# Patient Record
Sex: FEMALE | Race: WHITE | NOT HISPANIC OR LATINO | Employment: OTHER | URBAN - NONMETROPOLITAN AREA
[De-identification: names, ages, dates, MRNs, and addresses within clinical notes are randomized per-mention and may not be internally consistent; named-entity substitution may affect disease eponyms.]

---

## 2018-03-03 ENCOUNTER — APPOINTMENT (EMERGENCY)
Dept: RADIOLOGY | Facility: HOSPITAL | Age: 83
End: 2018-03-03
Payer: COMMERCIAL

## 2018-03-03 ENCOUNTER — HOSPITAL ENCOUNTER (EMERGENCY)
Facility: HOSPITAL | Age: 83
Discharge: NURSING, REHAB OR LONG-TERM CARE | End: 2018-03-03
Admitting: EMERGENCY MEDICINE
Payer: COMMERCIAL

## 2018-03-03 VITALS
RESPIRATION RATE: 18 BRPM | TEMPERATURE: 98 F | HEART RATE: 72 BPM | DIASTOLIC BLOOD PRESSURE: 60 MMHG | HEIGHT: 63 IN | SYSTOLIC BLOOD PRESSURE: 112 MMHG | BODY MASS INDEX: 28.39 KG/M2 | OXYGEN SATURATION: 100 % | WEIGHT: 160.25 LBS

## 2018-03-03 DIAGNOSIS — J40 BRONCHITIS: Primary | ICD-10-CM

## 2018-03-03 LAB
ALBUMIN SERPL BCP-MCNC: 2.6 G/DL (ref 3.5–5)
ALP SERPL-CCNC: 70 U/L (ref 46–116)
ALT SERPL W P-5'-P-CCNC: 16 U/L (ref 12–78)
ANION GAP SERPL CALCULATED.3IONS-SCNC: 7 MMOL/L (ref 4–13)
AST SERPL W P-5'-P-CCNC: 12 U/L (ref 5–45)
BASOPHILS # BLD AUTO: 0.04 THOUSANDS/ΜL (ref 0–0.1)
BASOPHILS NFR BLD AUTO: 0 % (ref 0–1)
BILIRUB SERPL-MCNC: 0.3 MG/DL (ref 0.2–1)
BUN SERPL-MCNC: 26 MG/DL (ref 5–25)
CALCIUM SERPL-MCNC: 8.2 MG/DL (ref 8.3–10.1)
CHLORIDE SERPL-SCNC: 110 MMOL/L (ref 100–108)
CO2 SERPL-SCNC: 25 MMOL/L (ref 21–32)
CREAT SERPL-MCNC: 1.13 MG/DL (ref 0.6–1.3)
EOSINOPHIL # BLD AUTO: 0.63 THOUSAND/ΜL (ref 0–0.61)
EOSINOPHIL NFR BLD AUTO: 5 % (ref 0–6)
ERYTHROCYTE [DISTWIDTH] IN BLOOD BY AUTOMATED COUNT: 15.7 % (ref 11.6–15.1)
FLUAV AG SPEC QL: NORMAL
FLUBV AG SPEC QL: NORMAL
GFR SERPL CREATININE-BSD FRML MDRD: 42 ML/MIN/1.73SQ M
GLUCOSE SERPL-MCNC: 86 MG/DL (ref 65–140)
HCT VFR BLD AUTO: 35.6 % (ref 34.8–46.1)
HGB BLD-MCNC: 11.4 G/DL (ref 11.5–15.4)
LACTATE SERPL-SCNC: 0.8 MMOL/L (ref 0.5–2)
LYMPHOCYTES # BLD AUTO: 1.83 THOUSANDS/ΜL (ref 0.6–4.47)
LYMPHOCYTES NFR BLD AUTO: 14 % (ref 14–44)
MCH RBC QN AUTO: 31.6 PG (ref 26.8–34.3)
MCHC RBC AUTO-ENTMCNC: 32 G/DL (ref 31.4–37.4)
MCV RBC AUTO: 99 FL (ref 82–98)
MONOCYTES # BLD AUTO: 0.85 THOUSAND/ΜL (ref 0.17–1.22)
MONOCYTES NFR BLD AUTO: 7 % (ref 4–12)
NEUTROPHILS # BLD AUTO: 9.4 THOUSANDS/ΜL (ref 1.85–7.62)
NEUTS SEG NFR BLD AUTO: 74 % (ref 43–75)
NT-PROBNP SERPL-MCNC: 429 PG/ML
PLATELET # BLD AUTO: 404 THOUSANDS/UL (ref 149–390)
PMV BLD AUTO: 8.7 FL (ref 8.9–12.7)
POTASSIUM SERPL-SCNC: 5.1 MMOL/L (ref 3.5–5.3)
PROT SERPL-MCNC: 6.1 G/DL (ref 6.4–8.2)
RBC # BLD AUTO: 3.61 MILLION/UL (ref 3.81–5.12)
RSV B RNA SPEC QL NAA+PROBE: NORMAL
SODIUM SERPL-SCNC: 142 MMOL/L (ref 136–145)
WBC # BLD AUTO: 12.75 THOUSAND/UL (ref 4.31–10.16)

## 2018-03-03 PROCEDURE — 83880 ASSAY OF NATRIURETIC PEPTIDE: CPT | Performed by: PHYSICIAN ASSISTANT

## 2018-03-03 PROCEDURE — 87798 DETECT AGENT NOS DNA AMP: CPT | Performed by: PHYSICIAN ASSISTANT

## 2018-03-03 PROCEDURE — 99285 EMERGENCY DEPT VISIT HI MDM: CPT

## 2018-03-03 PROCEDURE — 80053 COMPREHEN METABOLIC PANEL: CPT | Performed by: PHYSICIAN ASSISTANT

## 2018-03-03 PROCEDURE — 85025 COMPLETE CBC W/AUTO DIFF WBC: CPT | Performed by: PHYSICIAN ASSISTANT

## 2018-03-03 PROCEDURE — 93005 ELECTROCARDIOGRAM TRACING: CPT | Performed by: PHYSICIAN ASSISTANT

## 2018-03-03 PROCEDURE — 71046 X-RAY EXAM CHEST 2 VIEWS: CPT

## 2018-03-03 PROCEDURE — 83605 ASSAY OF LACTIC ACID: CPT | Performed by: PHYSICIAN ASSISTANT

## 2018-03-03 PROCEDURE — 36415 COLL VENOUS BLD VENIPUNCTURE: CPT | Performed by: PHYSICIAN ASSISTANT

## 2018-03-03 RX ORDER — FERROUS SULFATE 325(65) MG
325 TABLET ORAL 2 TIMES DAILY WITH MEALS
COMMUNITY
End: 2019-03-20 | Stop reason: HOSPADM

## 2018-03-03 RX ORDER — ECHINACEA PURPUREA EXTRACT 125 MG
1 TABLET ORAL 4 TIMES DAILY PRN
COMMUNITY

## 2018-03-03 RX ORDER — ACETAMINOPHEN 325 MG/1
650 TABLET ORAL EVERY 6 HOURS PRN
COMMUNITY
End: 2019-03-20 | Stop reason: HOSPADM

## 2018-03-03 RX ORDER — DOXYCYCLINE 100 MG/1
100 TABLET ORAL 2 TIMES DAILY
Qty: 14 TABLET | Refills: 0 | Status: SHIPPED | OUTPATIENT
Start: 2018-03-03 | End: 2018-03-10

## 2018-03-03 RX ORDER — DOXYCYCLINE HYCLATE 100 MG/1
100 CAPSULE ORAL ONCE
Status: COMPLETED | OUTPATIENT
Start: 2018-03-03 | End: 2018-03-03

## 2018-03-03 RX ORDER — MULTIVITAMIN
1 TABLET ORAL DAILY
COMMUNITY
End: 2019-03-20 | Stop reason: HOSPADM

## 2018-03-03 RX ORDER — LEVOTHYROXINE SODIUM 0.07 MG/1
88 TABLET ORAL DAILY
COMMUNITY
End: 2018-12-11 | Stop reason: HOSPADM

## 2018-03-03 RX ORDER — BISACODYL 10 MG
10 SUPPOSITORY, RECTAL RECTAL DAILY PRN
COMMUNITY

## 2018-03-03 RX ORDER — TOLTERODINE 4 MG/1
4 CAPSULE, EXTENDED RELEASE ORAL DAILY
COMMUNITY
End: 2019-03-20 | Stop reason: HOSPADM

## 2018-03-03 RX ADMIN — DOXYCYCLINE HYCLATE 100 MG: 100 CAPSULE ORAL at 14:57

## 2018-03-03 NOTE — DISCHARGE INSTRUCTIONS
Acute Bronchitis   WHAT YOU NEED TO KNOW:   Acute bronchitis is swelling and irritation in the air passages of your lungs  This irritation may cause you to cough or have other breathing problems  Acute bronchitis often starts because of another illness, such as a cold or the flu  The illness spreads from your nose and throat to your windpipe and airways  Bronchitis is often called a chest cold  Acute bronchitis lasts about 3 to 6 weeks and is usually not a serious illness  Your cough can last for several weeks  DISCHARGE INSTRUCTIONS:   Return to the emergency department if:   · You cough up blood  · Your lips or fingernails turn blue  · You feel like you are not getting enough air when you breathe  Contact your healthcare provider if:   · You have a fever  · Your breathing problems do not go away or get worse  · Your cough does not get better within 4 weeks  · You have questions or concerns about your condition or care  Self-care:   · Get more rest   Rest helps your body to heal  Slowly start to do more each day  Rest when you feel it is needed  · Avoid irritants in the air  Avoid chemicals, fumes, and dust  Wear a face mask if you must work around dust or fumes  Stay inside on days when air pollution levels are high  If you have allergies, stay inside when pollen counts are high  Do not use aerosol products, such as spray-on deodorant, bug spray, and hair spray  · Do not smoke or be around others who smoke  Nicotine and other chemicals in cigarettes and cigars damages the cilia that move mucus out of your lungs  Ask your healthcare provider for information if you currently smoke and need help to quit  E-cigarettes or smokeless tobacco still contain nicotine  Talk to your healthcare provider before you use these products  · Drink liquids as directed  Liquids help keep your air passages moist and help you cough up mucus   You may need to drink more liquids when you have acute bronchitis  Ask how much liquid to drink each day and which liquids are best for you  · Use a humidifier or vaporizer  Use a cool mist humidifier or a vaporizer to increase air moisture in your home  This may make it easier for you to breathe and help decrease your cough  Decrease risk for acute bronchitis:   · Get the vaccinations you need  Ask your healthcare provider if you should get vaccinated against the flu or pneumonia  · Prevent the spread of germs  You can decrease your risk of acute bronchitis and other illnesses by doing the following:     [facility] your hands often with soap and water  Carry germ-killing hand lotion or gel with you  You can use the lotion or gel to clean your hands when soap and water are not available  ¨ Do not touch your eyes, nose, or mouth unless you have washed your hands first     ¨ Always cover your mouth when you cough to prevent the spread of germs  It is best to cough into a tissue or your shirt sleeve instead of into your hand  Ask those around you cover their mouths when they cough  ¨ Try to avoid people who have a cold or the flu  If you are sick, stay away from others as much as possible  Medicines: Your healthcare provider may  give you any of the following:  · Ibuprofen or acetaminophen  are medicines that help lower your fever  They are available without a doctor's order  Ask your healthcare provider which medicine is right for you  Ask how much to take and how often to take it  Follow directions  These medicines can cause stomach bleeding if not taken correctly  Ibuprofen can cause kidney damage  Do not take ibuprofen if you have kidney disease, an ulcer, or allergies to aspirin  Acetaminophen can cause liver damage  Do not take more than 4,000 milligrams in 24 hours  · Decongestants  help loosen mucus in your lungs and make it easier to cough up  This can help you breathe easier  · Cough suppressants  decrease your urge to cough   If your cough produces mucus, do not take a cough suppressant unless your healthcare provider tells you to  Your healthcare provider may suggest that you take a cough suppressant at night so you can rest     · Inhalers  may be given  Your healthcare provider may give you one or more inhalers to help you breathe easier and cough less  An inhaler gives your medicine to open your airways  Ask your healthcare provider to show you how to use your inhaler correctly  · Take your medicine as directed  Contact your healthcare provider if you think your medicine is not helping or if you have side effects  Tell him of her if you are allergic to any medicine  Keep a list of the medicines, vitamins, and herbs you take  Include the amounts, and when and why you take them  Bring the list or the pill bottles to follow-up visits  Carry your medicine list with you in case of an emergency  Follow up with your healthcare provider as directed:  Write down questions you have so you will remember to ask them during your follow-up visits  © 2017 [address] [employer] Information is for End User's use only and may not be sold, redistributed or otherwise used for commercial purposes  All illustrations and images included in CareNotes® are the copyrighted property of [employer]  or Nicolás Reyes  The above information is an  only  It is not intended as medical advice for individual conditions or treatments  Talk to your doctor, nurse or pharmacist before following any medical regimen to see if it is safe and effective for you

## 2018-03-03 NOTE — ED PROVIDER NOTES
History  Chief Complaint   Patient presents with    Shortness of Breath     STATED SHE HAS A COUGH WITH SOB AND NASAL CONGESTION FOR THE PAST MONTH  Patient presents to the facility today via emergency medical services  She resides in a nursing home facility  She offers her own history and states over the last 4-6 weeks she has been experiencing nasal congestion with cough  Patient states she expelled mucus with the cough and also expelled as more mucus when she blows her nose  She denies any current chest pain  She states when she has coughing episodes she does experience shortness of breath  Patient denies any novel abdominal pain or novel back pain  She denies urinary complaints  Prior to Admission Medications   Prescriptions Last Dose Informant Patient Reported? Taking?    Multiple Vitamin (MULTIVITAMIN) tablet   Yes Yes   Sig: Take 1 tablet by mouth daily   acetaminophen (TYLENOL) 325 mg tablet   Yes Yes   Sig: Take 650 mg by mouth every 6 (six) hours as needed for mild pain   bisacodyl (DULCOLAX) 10 mg suppository   Yes Yes   Sig: Insert 10 mg into the rectum daily   collagenase (SANTYL) ointment   Yes Yes   Sig: Apply topically 3 (three) times a day   ferrous sulfate 325 (65 Fe) mg tablet   Yes Yes   Sig: Take 325 mg by mouth 2 (two) times a day with meals   levothyroxine 75 mcg tablet   Yes Yes   Sig: Take 75 mcg by mouth daily   magnesium hydroxide (MILK OF MAGNESIA) 400 mg/5 mL oral suspension   Yes Yes   Sig: Take by mouth daily as needed for constipation   sodium chloride (OCEAN) 0 65 % nasal spray   Yes Yes   Si spray into each nostril 4 (four) times a day as needed for congestion   tolterodine (DETROL LA) 4 mg 24 hr capsule   Yes Yes   Sig: Take 4 mg by mouth daily      Facility-Administered Medications: None       Past Medical History:   Diagnosis Date    Anemia     Disease of thyroid gland     Hyperlipidemia     IBS (irritable bowel syndrome)     Poliomyelitis     Renal disorder     UTI (urinary tract infection)        Past Surgical History:   Procedure Laterality Date    APPENDECTOMY         History reviewed  No pertinent family history  I have reviewed and agree with the history as documented  Social History   Substance Use Topics    Smoking status: Never Smoker    Smokeless tobacco: Never Used    Alcohol use No        Review of Systems   Constitutional: Negative  HENT: Positive for congestion, postnasal drip, rhinorrhea, sinus pain and sinus pressure  Negative for dental problem, drooling, ear discharge, ear pain, facial swelling, hearing loss, mouth sores, nosebleeds, sneezing, sore throat, tinnitus, trouble swallowing and voice change  Eyes: Negative  Respiratory: Positive for cough and shortness of breath  Negative for apnea, choking, chest tightness, wheezing and stridor  Cardiovascular: Negative  Gastrointestinal: Negative  Endocrine: Negative  Musculoskeletal: Negative  Skin: Negative  Allergic/Immunologic: Negative  Neurological: Negative  Hematological: Negative  Psychiatric/Behavioral: Negative  Physical Exam  ED Triage Vitals [03/03/18 1307]   Temperature Pulse Respirations Blood Pressure SpO2   98 °F (36 7 °C) 85 20 122/60 95 %      Temp Source Heart Rate Source Patient Position - Orthostatic VS BP Location FiO2 (%)   Tympanic Monitor Sitting Left arm --      Pain Score       3           Orthostatic Vital Signs  Vitals:    03/03/18 1307 03/03/18 1400 03/03/18 1445   BP: 122/60 112/65 122/69   Pulse: 85 81 76   Patient Position - Orthostatic VS: Sitting Sitting Sitting       Physical Exam   Constitutional: She is oriented to person, place, and time  She appears well-developed and well-nourished  No distress  HENT:   Head: Normocephalic  Right Ear: External ear normal    Left Ear: External ear normal    Copious amounts of yellow nasal discharge      No evidence of posterior pharyngeal erythema   Eyes: Conjunctivae and EOM are normal  Pupils are equal, round, and reactive to light  Neck: Normal range of motion  Cardiovascular: Normal rate  Murmur heard  Systolic murmur noted   Pulmonary/Chest: Effort normal and breath sounds normal  No respiratory distress  She has no wheezes  She has no rales  She exhibits no tenderness  Abdominal: Soft  Patient has large chronic lower abdominal hernia   Musculoskeletal: Normal range of motion  She exhibits edema  She exhibits no tenderness or deformity  +2 edema in lower legs   Neurological: She is alert and oriented to person, place, and time  Skin: Skin is warm  Capillary refill takes less than 2 seconds  She is not diaphoretic  Vitals reviewed  ED Medications  Medications   doxycycline hyclate (VIBRAMYCIN) capsule 100 mg (not administered)       Diagnostic Studies  Results Reviewed     Procedure Component Value Units Date/Time    Influenza A/B and RSV by PCR (indicated for patients >2 mo of age) [[id number]] Collected:  03/03/18 1440    Lab Status: In process Specimen:  Nasopharyngeal from Nasopharyngeal Swab Updated:  03/03/18 1444    Comprehensive metabolic panel [[id number]]  (Abnormal) Collected:  03/03/18 1315    Lab Status:  Final result Specimen:  Blood from Arm, Right Updated:  03/03/18 1353     Sodium 142 mmol/L      Potassium 5 1 mmol/L      Chloride 110 (H) mmol/L      CO2 25 mmol/L      Anion Gap 7 mmol/L      BUN 26 (H) mg/dL      Creatinine 1 13 mg/dL      Glucose 86 mg/dL      Calcium 8 2 (L) mg/dL      AST 12 U/L      ALT 16 U/L      Alkaline Phosphatase 70 U/L      Total Protein 6 1 (L) g/dL      Albumin 2 6 (L) g/dL      Total Bilirubin 0 30 mg/dL      eGFR 42 ml/min/1 73sq m     Narrative:         National Kidney Disease Education Program recommendations are as follows:  GFR calculation is accurate only with a steady state creatinine  Chronic Kidney disease less than 60 ml/min/1 73 sq  meters  Kidney failure less than 15 ml/min/1 73 sq  meters  BNP [[id number]]  (Normal) Collected:  03/03/18 1315    Lab Status:  Final result Specimen:  Blood from Arm, Right Updated:  03/03/18 1353     NT-proBNP 429 pg/mL     Lactic acid, plasma [[id number]]  (Normal) Collected:  03/03/18 1315    Lab Status:  Final result Specimen:  Blood from Arm, Right Updated:  03/03/18 1350     LACTIC ACID 0 8 mmol/L     Narrative:         Result may be elevated if tourniquet was used during collection      CBC and differential [[id number]]  (Abnormal) Collected:  03/03/18 1315    Lab Status:  Final result Specimen:  Blood from Arm, Right Updated:  03/03/18 1333     WBC 12 75 (H) Thousand/uL      RBC 3 61 (L) Million/uL      Hemoglobin 11 4 (L) g/dL      Hematocrit 35 6 %      MCV 99 (H) fL      MCH 31 6 pg      MCHC 32 0 g/dL      RDW 15 7 (H) %      MPV 8 7 (L) fL      Platelets 827 (H) Thousands/uL      Neutrophils Relative 74 %      Lymphocytes Relative 14 %      Monocytes Relative 7 %      Eosinophils Relative 5 %      Basophils Relative 0 %      Neutrophils Absolute 9 40 (H) Thousands/µL      Lymphocytes Absolute 1 83 Thousands/µL      Monocytes Absolute 0 85 Thousand/µL      Eosinophils Absolute 0 63 (H) Thousand/µL      Basophils Absolute 0 04 Thousands/µL                  XR chest 2 views    (Results Pending)              Procedures  Procedures       Phone Contacts  ED Phone Contact    ED Course  ED Course as of Mar 03 1452   Sat Mar 03, 2018   1404 LACTIC ACID: 0 8   1404 NT-proBNP: 429   1404 Sodium: 142   1404 Potassium: 5 1   1404 Chloride: (!) 110   1404 CO2: 25   1404 Anion Gap: 7   1404 BUN: (!) 26   1404 Creatinine: 1 13   1404 Calcium: (!) 8 2   1404 AST: 12   1404 Alkaline Phosphatase: 70   1404 Total Protein: (!) 6 1   1404 Albumin: (!) 2 6   1404 eGFR: 42   1404 Total Bilirubin: 0 30         HEART Risk Score    Flowsheet Row Most Recent Value   History  0 Filed at: 03/03/2018 1329   ECG  0 Filed at: 03/03/2018 1329   Age  2 Filed at: 03/03/2018 1329   Risk Factors  1 Filed at: 03/03/2018 1329   Troponin  0 Filed at: 03/03/2018 1329   Heart Score Risk Calculator   History  0 Filed at: 03/03/2018 1329   ECG  0 Filed at: 03/03/2018 1329   Age  2 Filed at: 03/03/2018 1329   Risk Factors  1 Filed at: 03/03/2018 1329   Troponin  0 Filed at: 03/03/2018 1329   HEART Score  3 Filed at: 03/03/2018 1329   HEART Score  3 Filed at: 03/03/2018 1329                            MDM  CritCare Time    Disposition  Final diagnoses:   Bronchitis     Time reflects when diagnosis was documented in both MDM as applicable and the Disposition within this note     Time User Action Codes Description Comment    3/3/2018  2:51 PM Suraj TINAJERO Add [J40] Bronchitis       ED Disposition     ED Disposition Condition Comment    Discharge  Lawence Montcalm discharge to home/self care  Condition at discharge: Good        Follow-up Information     Follow up With Specialties Details Why Contact Info    Irene Bunch MD Family Medicine Schedule an appointment as soon as possible for a visit  [address] [place] [address]  [phone]          Patient's Medications   Discharge Prescriptions    DOXYCYCLINE (ADOXA) 100 MG TABLET    Take 1 tablet (100 mg total) by mouth 2 (two) times a day for 7 days       Start Date: 3/3/2018  End Date: 3/10/2018       Order Dose: 100 mg       Quantity: 14 tablet    Refills: 0     No discharge procedures on file      ED Provider  Electronically Signed by           Fay Coker PA-C  03/03/18 9743

## 2018-03-03 NOTE — ED NOTES
apts to transport pt back to [facility]   [address]  [place]  03/03/18 2186

## 2018-03-03 NOTE — ED PROCEDURE NOTE
Procedure  ECG 12 Lead Documentation  Date/Time: 3/3/2018 1:28 PM  Performed by: Guero Florez  Authorized by: Juice TINAJERO     Indications / Diagnosis:  Shortness of breath  ECG reviewed by me, the ED Provider: yes    Interpretation:     Interpretation: normal    Rate:     ECG rate:  82    ECG rate assessment: normal    Rhythm:     Rhythm: sinus rhythm    Ectopy:     Ectopy: none    QRS:     QRS axis:  Normal    QRS intervals:  Normal  Conduction:     Conduction: normal    ST segments:     ST segments:  Normal  T waves:     T waves: normal                       Felicia Hoang PA-C  03/03/18 1329

## 2018-03-03 NOTE — ED NOTES
Report called to Wilton Bolaños at [facility]  continues to have a small amount of stridor with respirations but respirations easy at rest  Pulse ox 95%       Glory Larry RN  03/03/18 6504

## 2018-03-05 LAB
ATRIAL RATE: 82 BPM
P AXIS: 51 DEGREES
PR INTERVAL: 164 MS
QRS AXIS: 9 DEGREES
QRSD INTERVAL: 140 MS
QT INTERVAL: 414 MS
QTC INTERVAL: 483 MS
T WAVE AXIS: 31 DEGREES
VENTRICULAR RATE: 82 BPM

## 2018-03-05 PROCEDURE — 93010 ELECTROCARDIOGRAM REPORT: CPT | Performed by: INTERNAL MEDICINE

## 2018-06-28 ENCOUNTER — APPOINTMENT (EMERGENCY)
Dept: RADIOLOGY | Facility: HOSPITAL | Age: 83
End: 2018-06-28
Payer: COMMERCIAL

## 2018-06-28 ENCOUNTER — HOSPITAL ENCOUNTER (EMERGENCY)
Facility: HOSPITAL | Age: 83
Discharge: NURSING, REHAB OR LONG-TERM CARE | End: 2018-06-28
Admitting: EMERGENCY MEDICINE
Payer: COMMERCIAL

## 2018-06-28 VITALS
RESPIRATION RATE: 20 BRPM | SYSTOLIC BLOOD PRESSURE: 144 MMHG | HEART RATE: 86 BPM | TEMPERATURE: 98 F | WEIGHT: 168.87 LBS | OXYGEN SATURATION: 92 % | DIASTOLIC BLOOD PRESSURE: 64 MMHG | BODY MASS INDEX: 29.91 KG/M2

## 2018-06-28 DIAGNOSIS — N39.0 UTI (URINARY TRACT INFECTION): Primary | ICD-10-CM

## 2018-06-28 DIAGNOSIS — J40 BRONCHITIS: ICD-10-CM

## 2018-06-28 LAB
ALBUMIN SERPL BCP-MCNC: 2.9 G/DL (ref 3.5–5)
ALP SERPL-CCNC: 101 U/L (ref 46–116)
ALT SERPL W P-5'-P-CCNC: 22 U/L (ref 12–78)
ANION GAP SERPL CALCULATED.3IONS-SCNC: 7 MMOL/L (ref 4–13)
APTT PPP: 31 SECONDS (ref 24–36)
AST SERPL W P-5'-P-CCNC: 14 U/L (ref 5–45)
ATRIAL RATE: 86 BPM
BACTERIA UR QL AUTO: ABNORMAL /HPF
BASOPHILS # BLD AUTO: 0.03 THOUSANDS/ΜL (ref 0–0.1)
BASOPHILS NFR BLD AUTO: 0 % (ref 0–1)
BILIRUB SERPL-MCNC: 0.3 MG/DL (ref 0.2–1)
BILIRUB UR QL STRIP: NEGATIVE
BUN SERPL-MCNC: 34 MG/DL (ref 5–25)
CALCIUM SERPL-MCNC: 9.2 MG/DL (ref 8.3–10.1)
CHLORIDE SERPL-SCNC: 109 MMOL/L (ref 100–108)
CLARITY UR: ABNORMAL
CO2 SERPL-SCNC: 28 MMOL/L (ref 21–32)
COLOR UR: YELLOW
CREAT SERPL-MCNC: 1.18 MG/DL (ref 0.6–1.3)
EOSINOPHIL # BLD AUTO: 0.42 THOUSAND/ΜL (ref 0–0.61)
EOSINOPHIL NFR BLD AUTO: 5 % (ref 0–6)
ERYTHROCYTE [DISTWIDTH] IN BLOOD BY AUTOMATED COUNT: 15.2 % (ref 11.6–15.1)
GFR SERPL CREATININE-BSD FRML MDRD: 40 ML/MIN/1.73SQ M
GLUCOSE SERPL-MCNC: 82 MG/DL (ref 65–140)
GLUCOSE UR STRIP-MCNC: NEGATIVE MG/DL
HCT VFR BLD AUTO: 38 % (ref 34.8–46.1)
HGB BLD-MCNC: 12.2 G/DL (ref 11.5–15.4)
HGB UR QL STRIP.AUTO: ABNORMAL
INR PPP: 1.02 (ref 0.86–1.17)
KETONES UR STRIP-MCNC: NEGATIVE MG/DL
LACTATE SERPL-SCNC: 1 MMOL/L (ref 0.5–2)
LEUKOCYTE ESTERASE UR QL STRIP: ABNORMAL
LYMPHOCYTES # BLD AUTO: 1.08 THOUSANDS/ΜL (ref 0.6–4.47)
LYMPHOCYTES NFR BLD AUTO: 12 % (ref 14–44)
MAGNESIUM SERPL-MCNC: 2.1 MG/DL (ref 1.6–2.6)
MCH RBC QN AUTO: 31.8 PG (ref 26.8–34.3)
MCHC RBC AUTO-ENTMCNC: 32.1 G/DL (ref 31.4–37.4)
MCV RBC AUTO: 99 FL (ref 82–98)
MONOCYTES # BLD AUTO: 0.57 THOUSAND/ΜL (ref 0.17–1.22)
MONOCYTES NFR BLD AUTO: 6 % (ref 4–12)
NEUTROPHILS # BLD AUTO: 7.17 THOUSANDS/ΜL (ref 1.85–7.62)
NEUTS SEG NFR BLD AUTO: 77 % (ref 43–75)
NITRITE UR QL STRIP: NEGATIVE
NON-SQ EPI CELLS URNS QL MICRO: ABNORMAL /HPF
NT-PROBNP SERPL-MCNC: 330 PG/ML
P AXIS: 52 DEGREES
PH UR STRIP.AUTO: 6.5 [PH] (ref 4.5–8)
PLATELET # BLD AUTO: 292 THOUSANDS/UL (ref 149–390)
PMV BLD AUTO: 9.5 FL (ref 8.9–12.7)
POTASSIUM SERPL-SCNC: 5 MMOL/L (ref 3.5–5.3)
PR INTERVAL: 170 MS
PROT SERPL-MCNC: 7 G/DL (ref 6.4–8.2)
PROT UR STRIP-MCNC: ABNORMAL MG/DL
PROTHROMBIN TIME: 12.9 SECONDS (ref 11.8–14.2)
QRS AXIS: 5 DEGREES
QRSD INTERVAL: 148 MS
QT INTERVAL: 436 MS
QTC INTERVAL: 521 MS
RBC # BLD AUTO: 3.84 MILLION/UL (ref 3.81–5.12)
RBC #/AREA URNS AUTO: ABNORMAL /HPF
SODIUM SERPL-SCNC: 144 MMOL/L (ref 136–145)
SP GR UR STRIP.AUTO: 1.01 (ref 1–1.03)
T WAVE AXIS: 21 DEGREES
TROPONIN I SERPL-MCNC: <0.02 NG/ML
UROBILINOGEN UR QL STRIP.AUTO: 0.2 E.U./DL
VENTRICULAR RATE: 86 BPM
WBC # BLD AUTO: 9.27 THOUSAND/UL (ref 4.31–10.16)
WBC #/AREA URNS AUTO: ABNORMAL /HPF

## 2018-06-28 PROCEDURE — 81001 URINALYSIS AUTO W/SCOPE: CPT | Performed by: PHYSICIAN ASSISTANT

## 2018-06-28 PROCEDURE — 93005 ELECTROCARDIOGRAM TRACING: CPT

## 2018-06-28 PROCEDURE — 84484 ASSAY OF TROPONIN QUANT: CPT | Performed by: PHYSICIAN ASSISTANT

## 2018-06-28 PROCEDURE — 71046 X-RAY EXAM CHEST 2 VIEWS: CPT

## 2018-06-28 PROCEDURE — 85025 COMPLETE CBC W/AUTO DIFF WBC: CPT | Performed by: PHYSICIAN ASSISTANT

## 2018-06-28 PROCEDURE — 85730 THROMBOPLASTIN TIME PARTIAL: CPT | Performed by: PHYSICIAN ASSISTANT

## 2018-06-28 PROCEDURE — 83735 ASSAY OF MAGNESIUM: CPT | Performed by: PHYSICIAN ASSISTANT

## 2018-06-28 PROCEDURE — 85610 PROTHROMBIN TIME: CPT | Performed by: PHYSICIAN ASSISTANT

## 2018-06-28 PROCEDURE — 99285 EMERGENCY DEPT VISIT HI MDM: CPT

## 2018-06-28 PROCEDURE — 93010 ELECTROCARDIOGRAM REPORT: CPT | Performed by: INTERNAL MEDICINE

## 2018-06-28 PROCEDURE — 83605 ASSAY OF LACTIC ACID: CPT | Performed by: PHYSICIAN ASSISTANT

## 2018-06-28 PROCEDURE — 94640 AIRWAY INHALATION TREATMENT: CPT

## 2018-06-28 PROCEDURE — 80053 COMPREHEN METABOLIC PANEL: CPT | Performed by: PHYSICIAN ASSISTANT

## 2018-06-28 PROCEDURE — 83880 ASSAY OF NATRIURETIC PEPTIDE: CPT | Performed by: PHYSICIAN ASSISTANT

## 2018-06-28 PROCEDURE — 87040 BLOOD CULTURE FOR BACTERIA: CPT | Performed by: PHYSICIAN ASSISTANT

## 2018-06-28 PROCEDURE — 36415 COLL VENOUS BLD VENIPUNCTURE: CPT | Performed by: PHYSICIAN ASSISTANT

## 2018-06-28 RX ORDER — ALBUTEROL SULFATE 2.5 MG/3ML
2.5 SOLUTION RESPIRATORY (INHALATION) ONCE
Status: COMPLETED | OUTPATIENT
Start: 2018-06-28 | End: 2018-06-28

## 2018-06-28 RX ORDER — ACETAMINOPHEN 325 MG/1
650 TABLET ORAL ONCE
Status: COMPLETED | OUTPATIENT
Start: 2018-06-28 | End: 2018-06-28

## 2018-06-28 RX ORDER — CEPHALEXIN 250 MG/1
500 CAPSULE ORAL ONCE
Status: COMPLETED | OUTPATIENT
Start: 2018-06-28 | End: 2018-06-28

## 2018-06-28 RX ORDER — CEPHALEXIN 500 MG/1
500 CAPSULE ORAL EVERY 8 HOURS SCHEDULED
Qty: 15 CAPSULE | Refills: 0 | Status: SHIPPED | OUTPATIENT
Start: 2018-06-28 | End: 2018-07-03

## 2018-06-28 RX ADMIN — ACETAMINOPHEN 650 MG: 325 TABLET, FILM COATED ORAL at 20:29

## 2018-06-28 RX ADMIN — IPRATROPIUM BROMIDE 0.5 MG: 0.5 SOLUTION RESPIRATORY (INHALATION) at 12:49

## 2018-06-28 RX ADMIN — ALBUTEROL SULFATE 2.5 MG: 2.5 SOLUTION RESPIRATORY (INHALATION) at 12:49

## 2018-06-28 RX ADMIN — CEPHALEXIN 500 MG: 250 CAPSULE ORAL at 15:50

## 2018-06-28 NOTE — ED PROCEDURE NOTE
Procedure  ECG 12 Lead Documentation  Date/Time: 6/28/2018 12:37 PM  Performed by: David Beltre by: Eduin TINAJERO     Indications / Diagnosis:  Shortness of breath  ECG reviewed by me, the ED Provider: yes    Interpretation:     Interpretation: non-specific    Rate:     ECG rate:  85    ECG rate assessment: normal    Rhythm:     Rhythm: sinus rhythm    Ectopy:     Ectopy: none    QRS:     QRS axis:  Normal    QRS intervals:  Normal  Conduction:     Conduction: abnormal      Abnormal conduction: bifascicular block    ST segments:     ST segments:  Normal                     Vera Sapp PA-C  06/28/18 1238

## 2018-06-28 NOTE — ED PROCEDURE NOTE
Procedure  ECG 12 Lead Documentation  Date/Time: 6/28/2018 3:02 PM  Performed by: Loren Dempsey  Authorized by: Fiona TINAJERO     Indications / Diagnosis:  Shortness of breath  Patient location:  ED  Previous ECG:     Previous ECG:  Compared to current    Comparison ECG info:  3/18    Similarity:  No change  Interpretation:     Interpretation: normal    Rate:     ECG rate:  86  Rhythm:     Rhythm: sinus rhythm    Ectopy:     Ectopy: none    QRS:     QRS axis:  Normal    QRS intervals:  Normal  Conduction:     Conduction: normal    ST segments:     ST segments:  Normal  T waves:     T waves: normal                       Almaz Ness PA-C  06/28/18 150

## 2018-06-28 NOTE — ED PROVIDER NOTES
History  Chief Complaint   Patient presents with    Shortness of Breath     Patient states she has been short of breath for the past month but today it got worse  Patient presents to the emergency department today via [facility] for shortness of breath  We did receive a phone call ahead about an hour and a half prior to this arrival of a 80year-old nursing home patient who according to the nursing home staff has been short of breath since the 11 the 7 shift last night   They stated that they did give her a nebulizer treatment which was a DuoNeb at 0 600 hours this morning and that was ineffective  They noted oxygen saturations of 88% on room air and her lungs are full of everything     At bedside patient is alert and oriented and very polite and is a adequate history care  Patient states she has been coughing for greater than 1 month that has been short of breath over the same time     She denies chest pain or abdominal pain  She denies fevers  She does admit to new leg edema bilaterally however            Prior to Admission Medications   Prescriptions Last Dose Informant Patient Reported? Taking?    Multiple Vitamin (MULTIVITAMIN) tablet   Yes No   Sig: Take 1 tablet by mouth daily   acetaminophen (TYLENOL) 325 mg tablet   Yes No   Sig: Take 650 mg by mouth every 6 (six) hours as needed for mild pain   bisacodyl (DULCOLAX) 10 mg suppository   Yes No   Sig: Insert 10 mg into the rectum daily   collagenase (SANTYL) ointment   Yes No   Sig: Apply topically 3 (three) times a day   ferrous sulfate 325 (65 Fe) mg tablet   Yes No   Sig: Take 325 mg by mouth 2 (two) times a day with meals   levothyroxine 75 mcg tablet   Yes No   Sig: Take 75 mcg by mouth daily   magnesium hydroxide (MILK OF MAGNESIA) 400 mg/5 mL oral suspension   Yes No   Sig: Take by mouth daily as needed for constipation   sodium chloride (OCEAN) 0 65 % nasal spray   Yes No   Si spray into each nostril 4 (four) times a day as needed for congestion   tolterodine (DETROL LA) 4 mg 24 hr capsule   Yes No   Sig: Take 4 mg by mouth daily      Facility-Administered Medications: None       Past Medical History:   Diagnosis Date    Anemia     Disease of thyroid gland     Hyperlipidemia     IBS (irritable bowel syndrome)     Poliomyelitis     Renal disorder     UTI (urinary tract infection)        Past Surgical History:   Procedure Laterality Date    APPENDECTOMY         History reviewed  No pertinent family history  I have reviewed and agree with the history as documented  Social History   Substance Use Topics    Smoking status: Never Smoker    Smokeless tobacco: Never Used    Alcohol use No        Review of Systems   Constitutional: Negative  HENT: Negative  Eyes: Negative  Respiratory: Positive for cough and shortness of breath  Negative for apnea, choking, chest tightness, wheezing and stridor  Cardiovascular: Positive for leg swelling  Negative for chest pain and palpitations  Gastrointestinal: Negative  Endocrine: Negative  Genitourinary: Negative  Musculoskeletal: Negative  Skin: Negative  Allergic/Immunologic: Negative  Neurological: Negative  Hematological: Negative  Psychiatric/Behavioral: Negative  All other systems reviewed and are negative  Physical Exam  Physical Exam   Constitutional: She is oriented to person, place, and time  She appears well-developed and well-nourished  No distress  HENT:   Head: Normocephalic  Eyes: Pupils are equal, round, and reactive to light  Neck: Normal range of motion  Cardiovascular: Normal rate  Pulmonary/Chest: No respiratory distress  She has wheezes  She exhibits no tenderness  Diffuse wheeze and rhonchi   Abdominal: Soft  There is no tenderness  Musculoskeletal: She exhibits edema  +3 pitting edema bilaterally lower extremities   Neurological: She is alert and oriented to person, place, and time  Skin: Skin is warm  Capillary refill takes less than 2 seconds  She is not diaphoretic  Psychiatric: She has a normal mood and affect  Vitals reviewed        Vital Signs  ED Triage Vitals [06/28/18 1216]   Temperature Pulse Respirations Blood Pressure SpO2   98 °F (36 7 °C) 88 22 143/89 96 %      Temp Source Heart Rate Source Patient Position - Orthostatic VS BP Location FiO2 (%)   Temporal Monitor Lying Left arm --      Pain Score       2           Vitals:    06/28/18 1216 06/28/18 1230 06/28/18 1400 06/28/18 1501   BP: 143/89 133/84  130/61   Pulse: 88 91 85 84   Patient Position - Orthostatic VS: Lying   Lying       Visual Acuity      ED Medications  Medications   albuterol inhalation solution 2 5 mg (2 5 mg Nebulization Given 6/28/18 1249)   ipratropium (ATROVENT) 0 02 % inhalation solution 0 5 mg (0 5 mg Nebulization Given 6/28/18 1249)   cephalexin (KEFLEX) capsule 500 mg (500 mg Oral Given 6/28/18 1550)       Diagnostic Studies  Results Reviewed     Procedure Component Value Units Date/Time    Urine Microscopic [[id number]]  (Abnormal) Collected:  06/28/18 1500    Lab Status:  Final result Specimen:  Urine from Urine, Clean Catch Updated:  06/28/18 1522     RBC, UA 2-4 (A) /hpf      WBC, UA 10-20 (A) /hpf      Epithelial Cells None Seen /hpf      Bacteria, UA Occasional /hpf     UA w Reflex to Microscopic [[id number]]  (Abnormal) Collected:  06/28/18 1500    Lab Status:  Final result Specimen:  Urine from Urine, Clean Catch Updated:  06/28/18 1511     Color, UA Yellow     Clarity, UA Slightly Cloudy     Specific Gravity, UA 1 015     pH, UA 6 5     Leukocytes, UA Moderate (A)     Nitrite, UA Negative     Protein, UA Trace (A) mg/dl      Glucose, UA Negative mg/dl      Ketones, UA Negative mg/dl      Urobilinogen, UA 0 2 E U /dl      Bilirubin, UA Negative     Blood, UA Trace-Intact    Comprehensive metabolic panel [[id number]]  (Abnormal) Collected:  06/28/18 1232    Lab Status:  Final result Specimen:  Blood from Arm, Left Updated:  06/28/18 1308     Sodium 144 mmol/L      Potassium 5 0 mmol/L      Chloride 109 (H) mmol/L      CO2 28 mmol/L      Anion Gap 7 mmol/L      BUN 34 (H) mg/dL      Creatinine 1 18 mg/dL      Glucose 82 mg/dL      Calcium 9 2 mg/dL      AST 14 U/L      ALT 22 U/L      Alkaline Phosphatase 101 U/L      Total Protein 7 0 g/dL      Albumin 2 9 (L) g/dL      Total Bilirubin 0 30 mg/dL      eGFR 40 ml/min/1 73sq m     Narrative:         National Kidney Disease Education Program recommendations are as follows:  GFR calculation is accurate only with a steady state creatinine  Chronic Kidney disease less than 60 ml/min/1 73 sq  meters  Kidney failure less than 15 ml/min/1 73 sq  meters  Magnesium [[id number]]  (Normal) Collected:  06/28/18 1232    Lab Status:  Final result Specimen:  Blood from Arm, Left Updated:  06/28/18 1308     Magnesium 2 1 mg/dL     B-type natriuretic peptide [[id number]]  (Normal) Collected:  06/28/18 1232    Lab Status:  Final result Specimen:  Blood from Arm, Left Updated:  06/28/18 1308     NT-proBNP 330 pg/mL     Troponin I [[id number]]  (Normal) Collected:  06/28/18 1232    Lab Status:  Final result Specimen:  Blood from Arm, Left Updated:  06/28/18 1301     Troponin I <0 02 ng/mL     Lactic acid, plasma [[id number]]  (Normal) Collected:  06/28/18 1232    Lab Status:  Final result Specimen:  Blood from Arm, Left Updated:  06/28/18 1301     LACTIC ACID 1 0 mmol/L     Narrative:         Result may be elevated if tourniquet was used during collection      Veramarkos Cardona [[id number]]  (Normal) Collected:  06/28/18 1232    Lab Status:  Final result Specimen:  Blood from Arm, Left Updated:  06/28/18 1252     Protime 12 9 seconds      INR 1 02    APTT [[id number]]  (Normal) Collected:  06/28/18 1232    Lab Status:  Final result Specimen:  Blood from Arm, Left Updated:  06/28/18 1252     PTT 31 seconds     CBC and differential [[id number]]  (Abnormal) Collected:  06/28/18 1232    Lab Status: Final result Specimen:  Blood from Arm, Left Updated:  06/28/18 1243     WBC 9 27 Thousand/uL      RBC 3 84 Million/uL      Hemoglobin 12 2 g/dL      Hematocrit 38 0 %      MCV 99 (H) fL      MCH 31 8 pg      MCHC 32 1 g/dL      RDW 15 2 (H) %      MPV 9 5 fL      Platelets 003 Thousands/uL      Neutrophils Relative 77 (H) %      Lymphocytes Relative 12 (L) %      Monocytes Relative 6 %      Eosinophils Relative 5 %      Basophils Relative 0 %      Neutrophils Absolute 7 17 Thousands/µL      Lymphocytes Absolute 1 08 Thousands/µL      Monocytes Absolute 0 57 Thousand/µL      Eosinophils Absolute 0 42 Thousand/µL      Basophils Absolute 0 03 Thousands/µL     Blood culture #1 [[id number]] Collected:  06/28/18 1232    Lab Status: In process Specimen:  Blood from Hand, Left Updated:  06/28/18 1238    Blood culture #2 [[id number]] Collected:  06/28/18 1232    Lab Status: In process Specimen:  Blood from Arm, Left Updated:  06/28/18 1238                 XR chest 2 views   Final Result by Omayra Lion MD (06/28 1415)      No acute cardiopulmonary disease  Workstation performed: [id number]                    Procedures  Procedures       Phone Contacts  ED Phone Contact    ED Course  ED Course as of Jun 28 1553   Thu Jun 28, 2018   1219 SpO2: 96 %   1219 Respirations: 22   1219 Pulse: 88   1219 Temperature: 98 °F (36 7 °C)   1219 Blood Pressure: 143/89   1302 WBC: 9 27   1302 Hemoglobin: 12 2   1302 Platelets: 337   1405 PTT: 31   1302 Troponin I: <0 02   1302 INR: 1 02   1427 FINDINGS:    Cardiomediastinal silhouette appears unremarkable  The lungs are clear   No pneumothorax or pleural effusion  Old right rib fractures again identified   Right axillary surgical clips again noted  Impression       No acute cardiopulmonary disease          1427 NT-proBNP: 330   1507 Awaiting urine, at bedside pt doing well, O2 at 97% on RA    1513 Leukocytes, UA: (!) Moderate   1533 WBC, UA: (!) 10-20   1534 Bacteria, UA: Occasional   1538 Patient does have between 10 and 20 white cells per high-powered field  I did review a prior urine culture and will utilize cephalosporins to treat as an outpatient  HEART Risk Score      Most Recent Value   History  0 Filed at: 06/28/2018 1238   ECG  1 Filed at: 06/28/2018 1238   Age  2 Filed at: 06/28/2018 1238   Risk Factors  1 Filed at: 06/28/2018 1238   Troponin  0 Filed at: 06/28/2018 1238   Heart Score Risk Calculator   History  0 Filed at: 06/28/2018 1238   ECG  1 Filed at: 06/28/2018 1238   Age  2 Filed at: 06/28/2018 1238   Risk Factors  1 Filed at: 06/28/2018 1238   Troponin  0 Filed at: 06/28/2018 1238   HEART Score  4 Filed at: 06/28/2018 1238   HEART Score  4 Filed at: 06/28/2018 1238                Stroke Assessment     Row Name 06/28/18 1508             NIH Stroke Scale    Interval Baseline      Level of Consciousness (1a ) 0      LOC Questions (1b ) 0      LOC Commands (1c ) 0      Best Gaze (2 ) 0      Visual (3 ) 0      Facial Palsy (4 ) 0      Motor Arm, Left (5a ) 0      Motor Arm, Right (5b ) 0      Motor Leg, Left (6a ) 0      Motor Leg, Right (6b ) 0      Limb Ataxia (7 ) 0      Sensory (8 ) 0      Best Language (9 ) 0      Dysarthria (10 ) 0      Extinction and Inattention (11 ) (Formerly Neglect) 0      Total 0                          MDM  CritCare Time    Disposition  Final diagnoses:   UTI (urinary tract infection)   Bronchitis     Time reflects when diagnosis was documented in both MDM as applicable and the Disposition within this note     Time User Action Codes Description Comment    6/28/2018  3:39 PM Fanny TINAJERO Add [N39 0] UTI (urinary tract infection)     6/28/2018  3:39 PM Fanny TINAJERO Add [J40] Bronchitis       ED Disposition     ED Disposition Condition Comment    Discharge  Rachael Socks discharge to home/self care      Condition at discharge: Good        Follow-up Information     Follow up With Specialties Details Why Contact Info    Reford Marco Monica Barillas,  Family Medicine Schedule an appointment as soon as possible for a visit  [facility] [id number]  [phone]            Patient's Medications   Discharge Prescriptions    CEPHALEXIN (KEFLEX) 500 MG CAPSULE    Take 1 capsule (500 mg total) by mouth every 8 (eight) hours for 5 days       Start Date: 6/28/2018 End Date: 7/3/2018       Order Dose: 500 mg       Quantity: 15 capsule    Refills: 0     No discharge procedures on file      ED Provider  Electronically Signed by           Oswald Mcdowell PA-C  06/28/18 8401 [facility] Claudy Mei PA-C  06/28/18 [id number]

## 2018-07-03 LAB
BACTERIA BLD CULT: NORMAL
BACTERIA BLD CULT: NORMAL

## 2018-08-16 ENCOUNTER — APPOINTMENT (EMERGENCY)
Dept: ULTRASOUND IMAGING | Facility: HOSPITAL | Age: 83
End: 2018-08-16
Payer: COMMERCIAL

## 2018-08-16 ENCOUNTER — APPOINTMENT (EMERGENCY)
Dept: RADIOLOGY | Facility: HOSPITAL | Age: 83
End: 2018-08-16
Payer: COMMERCIAL

## 2018-08-16 ENCOUNTER — HOSPITAL ENCOUNTER (EMERGENCY)
Facility: HOSPITAL | Age: 83
Discharge: HOME | End: 2018-08-16
Attending: EMERGENCY MEDICINE
Payer: COMMERCIAL

## 2018-08-16 VITALS
HEART RATE: 73 BPM | BODY MASS INDEX: 27.99 KG/M2 | HEIGHT: 65 IN | TEMPERATURE: 96.9 F | SYSTOLIC BLOOD PRESSURE: 150 MMHG | RESPIRATION RATE: 17 BRPM | OXYGEN SATURATION: 97 % | WEIGHT: 167.99 LBS | DIASTOLIC BLOOD PRESSURE: 67 MMHG

## 2018-08-16 DIAGNOSIS — M79.89 SWELLING OF RIGHT LOWER EXTREMITY: ICD-10-CM

## 2018-08-16 DIAGNOSIS — M79.89 SWELLING OF LEFT LOWER EXTREMITY: Primary | ICD-10-CM

## 2018-08-16 LAB
ALBUMIN SERPL BCP-MCNC: 2.9 G/DL (ref 3.5–5)
ALP SERPL-CCNC: 86 U/L (ref 46–116)
ALT SERPL W P-5'-P-CCNC: 12 U/L (ref 12–78)
ANION GAP SERPL CALCULATED.3IONS-SCNC: 6 MMOL/L (ref 4–13)
AST SERPL W P-5'-P-CCNC: 16 U/L (ref 5–45)
BASOPHILS # BLD AUTO: 0.03 THOUSANDS/ΜL (ref 0–0.1)
BASOPHILS NFR BLD AUTO: 0 % (ref 0–1)
BILIRUB SERPL-MCNC: 0.2 MG/DL (ref 0.2–1)
BUN SERPL-MCNC: 37 MG/DL (ref 5–25)
CALCIUM SERPL-MCNC: 8.5 MG/DL (ref 8.3–10.1)
CHLORIDE SERPL-SCNC: 108 MMOL/L (ref 100–108)
CO2 SERPL-SCNC: 28 MMOL/L (ref 21–32)
CREAT SERPL-MCNC: 1.12 MG/DL (ref 0.6–1.3)
EOSINOPHIL # BLD AUTO: 0.72 THOUSAND/ΜL (ref 0–0.61)
EOSINOPHIL NFR BLD AUTO: 10 % (ref 0–6)
ERYTHROCYTE [DISTWIDTH] IN BLOOD BY AUTOMATED COUNT: 14.5 % (ref 11.6–15.1)
GFR SERPL CREATININE-BSD FRML MDRD: 42 ML/MIN/1.73SQ M
GLUCOSE SERPL-MCNC: 82 MG/DL (ref 65–140)
HCT VFR BLD AUTO: 38.6 % (ref 34.8–46.1)
HGB BLD-MCNC: 12.2 G/DL (ref 11.5–15.4)
IMM GRANULOCYTES # BLD AUTO: 0.02 THOUSAND/UL (ref 0–0.2)
IMM GRANULOCYTES NFR BLD AUTO: 0 % (ref 0–2)
LYMPHOCYTES # BLD AUTO: 1.47 THOUSANDS/ΜL (ref 0.6–4.47)
LYMPHOCYTES NFR BLD AUTO: 20 % (ref 14–44)
MCH RBC QN AUTO: 31.4 PG (ref 26.8–34.3)
MCHC RBC AUTO-ENTMCNC: 31.6 G/DL (ref 31.4–37.4)
MCV RBC AUTO: 100 FL (ref 82–98)
MONOCYTES # BLD AUTO: 0.56 THOUSAND/ΜL (ref 0.17–1.22)
MONOCYTES NFR BLD AUTO: 8 % (ref 4–12)
NEUTROPHILS # BLD AUTO: 4.47 THOUSANDS/ΜL (ref 1.85–7.62)
NEUTS SEG NFR BLD AUTO: 62 % (ref 43–75)
NRBC BLD AUTO-RTO: 0 /100 WBCS
NT-PROBNP SERPL-MCNC: 206 PG/ML
PLATELET # BLD AUTO: 223 THOUSANDS/UL (ref 149–390)
PMV BLD AUTO: 9.3 FL (ref 8.9–12.7)
POTASSIUM SERPL-SCNC: 5.1 MMOL/L (ref 3.5–5.3)
PROT SERPL-MCNC: 6.6 G/DL (ref 6.4–8.2)
RBC # BLD AUTO: 3.88 MILLION/UL (ref 3.81–5.12)
SODIUM SERPL-SCNC: 142 MMOL/L (ref 136–145)
TROPONIN I SERPL-MCNC: <0.02 NG/ML
WBC # BLD AUTO: 7.27 THOUSAND/UL (ref 4.31–10.16)

## 2018-08-16 PROCEDURE — 93005 ELECTROCARDIOGRAM TRACING: CPT

## 2018-08-16 PROCEDURE — 93970 EXTREMITY STUDY: CPT

## 2018-08-16 PROCEDURE — 80053 COMPREHEN METABOLIC PANEL: CPT | Performed by: EMERGENCY MEDICINE

## 2018-08-16 PROCEDURE — 85025 COMPLETE CBC W/AUTO DIFF WBC: CPT | Performed by: EMERGENCY MEDICINE

## 2018-08-16 PROCEDURE — 99284 EMERGENCY DEPT VISIT MOD MDM: CPT

## 2018-08-16 PROCEDURE — 84484 ASSAY OF TROPONIN QUANT: CPT | Performed by: EMERGENCY MEDICINE

## 2018-08-16 PROCEDURE — 71046 X-RAY EXAM CHEST 2 VIEWS: CPT

## 2018-08-16 PROCEDURE — 83880 ASSAY OF NATRIURETIC PEPTIDE: CPT | Performed by: EMERGENCY MEDICINE

## 2018-08-16 PROCEDURE — 36415 COLL VENOUS BLD VENIPUNCTURE: CPT | Performed by: EMERGENCY MEDICINE

## 2018-08-16 PROCEDURE — 93970 EXTREMITY STUDY: CPT | Performed by: SURGERY

## 2018-08-16 RX ORDER — CEPHALEXIN 250 MG/1
500 CAPSULE ORAL ONCE
Status: COMPLETED | OUTPATIENT
Start: 2018-08-16 | End: 2018-08-16

## 2018-08-16 RX ORDER — CEPHALEXIN 250 MG/1
500 CAPSULE ORAL 4 TIMES DAILY
Qty: 56 CAPSULE | Refills: 0 | Status: SHIPPED | OUTPATIENT
Start: 2018-08-16 | End: 2018-08-23

## 2018-08-16 RX ADMIN — CEPHALEXIN 500 MG: 250 CAPSULE ORAL at 17:46

## 2018-08-16 NOTE — DISCHARGE INSTRUCTIONS
Edema   WHAT YOU NEED TO KNOW:   What is edema? Edema is swelling throughout your body  Edema is usually a sign that you are retaining fluid  The swelling may be caused by heart failure or kidney, thyroid, or liver disease  It may also be caused by medicines such as antidepressants, blood pressure medicines, or hormones  Sudden swelling around the lips or face may be a sign of a severe allergic reaction  Swelling of an arm or leg may be caused by blockage of your veins  What other signs and symptoms may occur with edema? · Discomfort or tenderness in the swollen areas    · Tight and shiny skin over the swollen areas    · Weight gain  How is edema diagnosed? Your healthcare provider will ask about your symptoms and any other symptoms you have  He may also ask about any medical conditions you have  Your healthcare provider will examine your skin over the swollen areas  He may gently push on the swollen area for a short time to see if this leaves a dimple  He may also order tests to find the cause of your edema  How is edema treated and managed? Treatment for edema depends on the cause  Depending on your medical condition, you may be given medicine to help get rid of extra body fluid  Your healthcare provider may suggest that you do any of the following to help manage edema:  · Elevate  your arms or legs as directed  Raise them above the level of your heart as often as you can  This will help decrease swelling and pain  Prop them on pillows or blankets to keep them elevated comfortably  · Wear pressure stockings as directed  The stockings are tight and put pressure on your legs  This helps to keep fluid from collecting in your legs or ankles  · Limit your salt intake  Salt causes your body to hold water  Ask about any other changes to your diet  · Stay active  Do not stand or sit for long periods of time  Ask your healthcare provider about the best exercise plan for you      · Keep your skin moist  using lotion, cream, or ointment  Ask your healthcare provider what to use and how often to use it  When should I contact my healthcare provider? · The swollen area feels cold and is pale or blue in color  · The swollen area feels warm, painful, and is red in color  · You have increased swelling or swelling in other parts of your body  · You have questions or concerns about your condition or care  When should I seek immediate care? · You have shortness of breath at rest, especially when you lie down  · You cough up pink, foamy sputum  · You have chest pain  · Your heartbeat is fast or uneven  CARE AGREEMENT:   You have the right to help plan your care  Learn about your health condition and how it may be treated  Discuss treatment options with your caregivers to decide what care you want to receive  You always have the right to refuse treatment  The above information is an  only  It is not intended as medical advice for individual conditions or treatments  Talk to your doctor, nurse or pharmacist before following any medical regimen to see if it is safe and effective for you  © 2017 [employer]  Information is for End User's use only and may not be sold, redistributed or otherwise used for commercial purposes  All illustrations and images included in CareNotes® are the copyrighted property of [employer]  or Nicol[employer] Reyes

## 2018-08-16 NOTE — ED NOTES
Called [facility] ambulance for return to [facility]   Awaiting callback with Dariana Montez RN  08/16/18 9204

## 2018-08-16 NOTE — ED NOTES
Call placed to [facility]  Spoke with jorge  Made her aware of difficulty obtaining transport for patient   To call back with answers from their call to med [facility] and [facility]     Estefany Hernandez RN  08/16/18 1950

## 2018-08-16 NOTE — ED PROVIDER NOTES
Pt Name: Bladimir Ryan  MRN: [id number]  [place] 1/3/1924  Age/Sex: 80 y o  female  Date of evaluation: 8/16/2018  PCP: Yenny Mcnair, [address] [place] [address]    Chief Complaint   Patient presents with    Leg Swelling     bilateral leg swelling  Right worse than left  Redness left ankle  HPI    Gabo Schmitz presents to the Emergency Department complaining of leg swelling  She has asymmetric legs as baseline due to polio as a child but now they are more swollen that usual   She was sent from [place] for evaluation of the increased swelling  HPI      Past Medical and Surgical History    Past Medical History:   Diagnosis Date    Anemia     Arthritis     Cancer ([address] )     breast    Disease of thyroid gland     Hyperlipidemia     IBS (irritable bowel syndrome)     Overactive bladder     Peripheral neuropathy     Polio     Poliomyelitis     Renal disorder     Rhabdomyolysis     UTI (urinary tract infection)     Ventral hernia        Past Surgical History:   Procedure Laterality Date    APPENDECTOMY      BREAST SURGERY      right mastectomy       History reviewed  No pertinent family history  Social History   Substance Use Topics    Smoking status: Never Smoker    Smokeless tobacco: Never Used    Alcohol use No              Allergies    No Known Allergies    Home Medications    Prior to Admission medications    Medication Sig Start Date End Date Taking?  Authorizing Provider   acetaminophen (TYLENOL) 325 mg tablet Take 650 mg by mouth every 6 (six) hours as needed for mild pain    Historical Provider, MD   bisacodyl (DULCOLAX) 10 mg suppository Insert 10 mg into the rectum daily    Historical Provider, MD   collagenase (SANTYL) ointment Apply topically 3 (three) times a day    Historical Provider, MD   ferrous sulfate 325 (65 Fe) mg tablet Take 325 mg by mouth 2 (two) times a day with meals    Historical Provider, MD   levothyroxine 75 mcg tablet Take 75 mcg by mouth daily    Historical Provider, MD   magnesium hydroxide (MILK OF MAGNESIA) 400 mg/5 mL oral suspension Take by mouth daily as needed for constipation    Historical Provider, MD   Multiple Vitamin (MULTIVITAMIN) tablet Take 1 tablet by mouth daily    Historical Provider, MD   sodium chloride (OCEAN) 0 65 % nasal spray 1 spray into each nostril 4 (four) times a day as needed for congestion    Historical Provider, MD   tolterodine (DETROL LA) 4 mg 24 hr capsule Take 4 mg by mouth daily    Historical Provider, MD           Review of Systems    Review of Systems   Constitutional: Negative for activity change, appetite change, chills, diaphoresis, fatigue and fever  HENT: Negative for congestion, postnasal drip, rhinorrhea, sinus pressure, sneezing and sore throat  Eyes: Negative for pain and visual disturbance  Respiratory: Negative for cough, chest tightness and shortness of breath  Cardiovascular: Negative for chest pain, palpitations and leg swelling  Gastrointestinal: Negative for abdominal distention, abdominal pain, constipation, diarrhea, nausea and vomiting  Endocrine: Negative for polydipsia, polyphagia and polyuria  Genitourinary: Negative for decreased urine volume, difficulty urinating, dysuria, flank pain, frequency and hematuria  Musculoskeletal: Negative for arthralgias, gait problem, joint swelling and neck pain  Skin: Negative for pallor and rash  Allergic/Immunologic: Negative for immunocompromised state  Neurological: Negative for syncope, speech difficulty, weakness, light-headedness, numbness and headaches  All other systems reviewed and are negative        Physical Exam      ED Triage Vitals [08/16/18 1445]   Temperature Pulse Respirations Blood Pressure SpO2   (!) 96 9 °F (36 1 °C) 75 18 151/72 97 %      Temp Source Heart Rate Source Patient Position - Orthostatic VS BP Location FiO2 (%)   Temporal Monitor Sitting Left arm --      Pain Score       No Pain               Physical Exam Constitutional: She is oriented to person, place, and time  She appears well-developed and well-nourished  No distress  HENT:   Head: Normocephalic and atraumatic  Nose: Nose normal    Mouth/Throat: Oropharynx is clear and moist    Eyes: Conjunctivae, EOM and lids are normal  Pupils are equal, round, and reactive to light  Neck: Normal range of motion  Neck supple  Cardiovascular: Normal rate, regular rhythm and normal heart sounds  Exam reveals no gallop and no friction rub  No murmur heard  Pulmonary/Chest: Effort normal and breath sounds normal  No accessory muscle usage  No respiratory distress  She has no wheezes  She has no rales  Abdominal: Soft  She exhibits no distension  There is no tenderness  There is no rebound and no guarding  Musculoskeletal: She exhibits edema  Neurological: She is alert and oriented to person, place, and time  No cranial nerve deficit or sensory deficit  Skin: Skin is warm and dry  No rash noted  She is not diaphoretic  No erythema  Psychiatric: She has a normal mood and affect  Her speech is normal and behavior is normal  Judgment and thought content normal    Nursing note and vitals reviewed  Assessment and Plan    Remy Perez is a 80 y o  female who presents with lower extremity swelling BL  Physical examination remarkable for same  Differential diagnosis (not completely inclusive) includes DVTs, CHF etc  Plan will be to perform diagnostic testing and treat symptomatically  MDM    Diagnostic Results    EKG:  normal EKG, normal sinus rhythm, RBBB    EKG INTERPRETATION  EKG Interpretation    Rate: 72 BPM  Rhythm: sinus  Axis: normal  Intervals: Normal, no blocks, QTc 497ms  Q waves:   T waves: non specific  ST segments: non specific    Impression:  EKG  EKG for comparison: not available  EKG interpreted by me  Interpretation by Domi Bonilla DO  EKG reviewed and interpreted independently      Labs:    Results for orders placed or performed during the hospital encounter of 08/16/18   CBC and differential   Result Value Ref Range    WBC 7 27 4 31 - 10 16 Thousand/uL    RBC 3 88 3 81 - 5 12 Million/uL    Hemoglobin 12 2 11 5 - 15 4 g/dL    Hematocrit 38 6 34 8 - 46 1 %     (H) 82 - 98 fL    MCH 31 4 26 8 - 34 3 pg    MCHC 31 6 31 4 - 37 4 g/dL    RDW 14 5 11 6 - 15 1 %    MPV 9 3 8 9 - 12 7 fL    Platelets 547 510 - 193 Thousands/uL    nRBC 0 /100 WBCs    Neutrophils Relative 62 43 - 75 %    Immat GRANS % 0 0 - 2 %    Lymphocytes Relative 20 14 - 44 %    Monocytes Relative 8 4 - 12 %    Eosinophils Relative 10 (H) 0 - 6 %    Basophils Relative 0 0 - 1 %    Neutrophils Absolute 4 47 1 85 - 7 62 Thousands/µL    Immature Grans Absolute 0 02 0 00 - 0 20 Thousand/uL    Lymphocytes Absolute 1 47 0 60 - 4 47 Thousands/µL    Monocytes Absolute 0 56 0 17 - 1 22 Thousand/µL    Eosinophils Absolute 0 72 (H) 0 00 - 0 61 Thousand/µL    Basophils Absolute 0 03 0 00 - 0 10 Thousands/µL   Comprehensive metabolic panel   Result Value Ref Range    Sodium 142 136 - 145 mmol/L    Potassium 5 1 3 5 - 5 3 mmol/L    Chloride 108 100 - 108 mmol/L    CO2 28 21 - 32 mmol/L    Anion Gap 6 4 - 13 mmol/L    BUN 37 (H) 5 - 25 mg/dL    Creatinine 1 12 0 60 - 1 30 mg/dL    Glucose 82 65 - 140 mg/dL    Calcium 8 5 8 3 - 10 1 mg/dL    AST 16 5 - 45 U/L    ALT 12 12 - 78 U/L    Alkaline Phosphatase 86 46 - 116 U/L    Total Protein 6 6 6 4 - 8 2 g/dL    Albumin 2 9 (L) 3 5 - 5 0 g/dL    Total Bilirubin 0 20 0 20 - 1 00 mg/dL    eGFR 42 ml/min/1 73sq m   Troponin I   Result Value Ref Range    Troponin I <0 02 <=0 04 ng/mL   B-type natriuretic peptide   Result Value Ref Range    NT-proBNP 206 <450 pg/mL   ECG 12 lead   Result Value Ref Range    Ventricular Rate 72 BPM    Atrial Rate 72 BPM    NJ Interval 188 ms    QRSD Interval 140 ms    QT Interval 454 ms    QTC Interval 497 ms    P Axis 47 degrees    QRS Axis 7 degrees    T Wave Axis 22 degrees       All labs reviewed and utilized in the medical decision making process    Radiology:    VAS lower limb venous duplex study, complete bilateral   Final Result      XR chest 2 views   Final Result      1  Small left pleural effusion  2   Hiatal hernia  Workstation performed: [id number]             All radiology studies independently viewed by me and interpreted by the radiologist     Procedure    Procedures    CritCare Time      ED Course of Care and Re-Assessments        Medications   cephalexin (KEFLEX) capsule 500 mg (500 mg Oral Given 8/16/18 1746)           FINAL IMPRESSION    Final diagnoses:   Swelling of left lower extremity   Swelling of right lower extremity         DISPOSITION/PLAN    Time reflects when diagnosis was documented in both MDM as applicable and the Disposition within this note     Time User Action Codes Description Comment    8/16/2018  5:11 PM Altamese Bolton Add [M79 89] Swelling of left lower extremity     8/16/2018  5:13 PM Altamese Aayush L Add [M79 89] Swelling of right lower extremity       ED Disposition     ED Disposition Condition Comment    Discharge  Padmini Hylton discharge to home/self care      Condition at discharge: Good        Follow-up Information     Follow up With Specialties Details Why Contact Info Additional Theodore Huertas DO Family Medicine Schedule an appointment as soon as possible for a visit  [address] [place] [address] Emergency Department Emergency Medicine Go to As needed, If symptoms worsen [facility] ED, [facility], [place], [address] [facility], [address] [facility] TO:    Quique Kirkland DO  [facility] [address] [place] [address]  [phone]    Schedule an appointment as soon as possible for a visit      [facility] Emergency Department  [place] [id number]  [phone]  Go to  As needed, If symptoms worsen      DISCHARGE MEDICATIONS:    Discharge Medication List as of 8/16/2018  5:13 PM      START taking these medications    Details   cephalexin (KEFLEX) 250 mg capsule Take 2 capsules (500 mg total) by mouth 4 (four) times a day for 7 days, Starting Thu 8/16/2018, Until u 8/23/2018, Print         CONTINUE these medications which have NOT CHANGED    Details   acetaminophen (TYLENOL) 325 mg tablet Take 650 mg by mouth every 6 (six) hours as needed for mild pain, Historical Med      bisacodyl (DULCOLAX) 10 mg suppository Insert 10 mg into the rectum daily, Historical Med      collagenase (SANTYL) ointment Apply topically 3 (three) times a day, Historical Med      ferrous sulfate 325 (65 Fe) mg tablet Take 325 mg by mouth 2 (two) times a day with meals, Historical Med      levothyroxine 75 mcg tablet Take 75 mcg by mouth daily, Historical Med      magnesium hydroxide (MILK OF MAGNESIA) 400 mg/5 mL oral suspension Take by mouth daily as needed for constipation, Historical Med      Multiple Vitamin (MULTIVITAMIN) tablet Take 1 tablet by mouth daily, Historical Med      sodium chloride (OCEAN) 0 65 % nasal spray 1 spray into each nostril 4 (four) times a day as needed for congestion, Historical Med      tolterodine (DETROL LA) 4 mg 24 hr capsule Take 4 mg by mouth daily, Historical Med             No discharge procedures on file           Aston Cruz, [address] [place] [address],   08/17/18 Messi Cannon

## 2018-08-17 LAB
ATRIAL RATE: 72 BPM
P AXIS: 47 DEGREES
PR INTERVAL: 188 MS
QRS AXIS: 7 DEGREES
QRSD INTERVAL: 140 MS
QT INTERVAL: 454 MS
QTC INTERVAL: 497 MS
T WAVE AXIS: 22 DEGREES
VENTRICULAR RATE: 72 BPM

## 2018-08-17 PROCEDURE — 93010 ELECTROCARDIOGRAM REPORT: CPT | Performed by: INTERNAL MEDICINE

## 2018-10-17 ENCOUNTER — APPOINTMENT (EMERGENCY)
Dept: RADIOLOGY | Facility: HOSPITAL | Age: 83
DRG: 070 | End: 2018-10-17
Payer: COMMERCIAL

## 2018-10-17 ENCOUNTER — HOSPITAL ENCOUNTER (INPATIENT)
Facility: HOSPITAL | Age: 83
LOS: 4 days | Discharge: NURSING, REHAB OR LONG-TERM CARE | DRG: 070 | End: 2018-10-22
Attending: EMERGENCY MEDICINE | Admitting: FAMILY MEDICINE
Payer: COMMERCIAL

## 2018-10-17 ENCOUNTER — APPOINTMENT (EMERGENCY)
Dept: CT IMAGING | Facility: HOSPITAL | Age: 83
DRG: 070 | End: 2018-10-17
Payer: COMMERCIAL

## 2018-10-17 DIAGNOSIS — R09.81 NASAL CONGESTION: ICD-10-CM

## 2018-10-17 DIAGNOSIS — R47.81 SLURRED SPEECH: ICD-10-CM

## 2018-10-17 DIAGNOSIS — R06.00 DYSPNEA: ICD-10-CM

## 2018-10-17 DIAGNOSIS — N30.00 ACUTE CYSTITIS WITHOUT HEMATURIA: ICD-10-CM

## 2018-10-17 DIAGNOSIS — R09.02 HYPOXIA: ICD-10-CM

## 2018-10-17 DIAGNOSIS — R05.9 COUGH: Primary | ICD-10-CM

## 2018-10-17 PROBLEM — H91.10 PRESBYCUSIS: Status: ACTIVE | Noted: 2018-10-17

## 2018-10-17 PROBLEM — J96.01 ACUTE RESPIRATORY FAILURE WITH HYPOXIA (HCC): Status: ACTIVE | Noted: 2018-10-17

## 2018-10-17 PROBLEM — E03.8 OTHER SPECIFIED HYPOTHYROIDISM: Status: ACTIVE | Noted: 2018-10-17

## 2018-10-17 PROBLEM — R53.1 GENERALIZED WEAKNESS: Status: ACTIVE | Noted: 2018-10-17

## 2018-10-17 PROBLEM — J96.11 CHRONIC RESPIRATORY FAILURE WITH HYPOXIA (HCC): Status: ACTIVE | Noted: 2018-10-17

## 2018-10-17 PROBLEM — G93.40 ACUTE ENCEPHALOPATHY: Status: ACTIVE | Noted: 2018-10-17

## 2018-10-17 PROBLEM — K59.01 SLOW TRANSIT CONSTIPATION: Status: ACTIVE | Noted: 2018-10-17

## 2018-10-17 LAB
ALBUMIN SERPL BCP-MCNC: 2.7 G/DL (ref 3.5–5)
ALP SERPL-CCNC: 93 U/L (ref 46–116)
ALT SERPL W P-5'-P-CCNC: 19 U/L (ref 12–78)
ANION GAP SERPL CALCULATED.3IONS-SCNC: 5 MMOL/L (ref 4–13)
AST SERPL W P-5'-P-CCNC: 21 U/L (ref 5–45)
BASE EX.OXY STD BLDV CALC-SCNC: 79.5 % (ref 60–80)
BASE EXCESS BLDV CALC-SCNC: 5.2 MMOL/L
BASOPHILS # BLD AUTO: 0.04 THOUSANDS/ΜL (ref 0–0.1)
BASOPHILS NFR BLD AUTO: 1 % (ref 0–1)
BILIRUB SERPL-MCNC: 0.2 MG/DL (ref 0.2–1)
BUN SERPL-MCNC: 24 MG/DL (ref 5–25)
CALCIUM SERPL-MCNC: 8.8 MG/DL (ref 8.3–10.1)
CHLORIDE SERPL-SCNC: 107 MMOL/L (ref 100–108)
CO2 SERPL-SCNC: 33 MMOL/L (ref 21–32)
CREAT SERPL-MCNC: 1.3 MG/DL (ref 0.6–1.3)
EOSINOPHIL # BLD AUTO: 0.42 THOUSAND/ΜL (ref 0–0.61)
EOSINOPHIL NFR BLD AUTO: 6 % (ref 0–6)
ERYTHROCYTE [DISTWIDTH] IN BLOOD BY AUTOMATED COUNT: 13.8 % (ref 11.6–15.1)
GFR SERPL CREATININE-BSD FRML MDRD: 35 ML/MIN/1.73SQ M
GLUCOSE SERPL-MCNC: 89 MG/DL (ref 65–140)
HCO3 BLDV-SCNC: 33.3 MMOL/L (ref 24–30)
HCT VFR BLD AUTO: 37.8 % (ref 34.8–46.1)
HGB BLD-MCNC: 11.7 G/DL (ref 11.5–15.4)
IMM GRANULOCYTES # BLD AUTO: 0.03 THOUSAND/UL (ref 0–0.2)
IMM GRANULOCYTES NFR BLD AUTO: 0 % (ref 0–2)
LYMPHOCYTES # BLD AUTO: 1.72 THOUSANDS/ΜL (ref 0.6–4.47)
LYMPHOCYTES NFR BLD AUTO: 24 % (ref 14–44)
MCH RBC QN AUTO: 31.1 PG (ref 26.8–34.3)
MCHC RBC AUTO-ENTMCNC: 31 G/DL (ref 31.4–37.4)
MCV RBC AUTO: 101 FL (ref 82–98)
MONOCYTES # BLD AUTO: 0.41 THOUSAND/ΜL (ref 0.17–1.22)
MONOCYTES NFR BLD AUTO: 6 % (ref 4–12)
NEUTROPHILS # BLD AUTO: 4.57 THOUSANDS/ΜL (ref 1.85–7.62)
NEUTS SEG NFR BLD AUTO: 63 % (ref 43–75)
NRBC BLD AUTO-RTO: 0 /100 WBCS
NT-PROBNP SERPL-MCNC: 356 PG/ML
O2 CT BLDV-SCNC: 14.2 ML/DL
PCO2 BLDV: 67.1 MM HG (ref 42–50)
PH BLDV: 7.31 [PH] (ref 7.3–7.4)
PLATELET # BLD AUTO: 313 THOUSANDS/UL (ref 149–390)
PMV BLD AUTO: 9.1 FL (ref 8.9–12.7)
PO2 BLDV: 47 MM HG (ref 35–45)
POTASSIUM SERPL-SCNC: 4.5 MMOL/L (ref 3.5–5.3)
PROT SERPL-MCNC: 7.1 G/DL (ref 6.4–8.2)
RBC # BLD AUTO: 3.76 MILLION/UL (ref 3.81–5.12)
SODIUM SERPL-SCNC: 145 MMOL/L (ref 136–145)
TROPONIN I SERPL-MCNC: <0.02 NG/ML
WBC # BLD AUTO: 7.19 THOUSAND/UL (ref 4.31–10.16)

## 2018-10-17 PROCEDURE — 83880 ASSAY OF NATRIURETIC PEPTIDE: CPT | Performed by: EMERGENCY MEDICINE

## 2018-10-17 PROCEDURE — 94640 AIRWAY INHALATION TREATMENT: CPT

## 2018-10-17 PROCEDURE — 99285 EMERGENCY DEPT VISIT HI MDM: CPT

## 2018-10-17 PROCEDURE — 85025 COMPLETE CBC W/AUTO DIFF WBC: CPT | Performed by: EMERGENCY MEDICINE

## 2018-10-17 PROCEDURE — 94760 N-INVAS EAR/PLS OXIMETRY 1: CPT

## 2018-10-17 PROCEDURE — 70450 CT HEAD/BRAIN W/O DYE: CPT

## 2018-10-17 PROCEDURE — 71046 X-RAY EXAM CHEST 2 VIEWS: CPT

## 2018-10-17 PROCEDURE — 96374 THER/PROPH/DIAG INJ IV PUSH: CPT

## 2018-10-17 PROCEDURE — 84484 ASSAY OF TROPONIN QUANT: CPT | Performed by: EMERGENCY MEDICINE

## 2018-10-17 PROCEDURE — 93005 ELECTROCARDIOGRAM TRACING: CPT

## 2018-10-17 PROCEDURE — 36415 COLL VENOUS BLD VENIPUNCTURE: CPT | Performed by: EMERGENCY MEDICINE

## 2018-10-17 PROCEDURE — 80053 COMPREHEN METABOLIC PANEL: CPT | Performed by: EMERGENCY MEDICINE

## 2018-10-17 PROCEDURE — 99220 PR INITIAL OBSERVATION CARE/DAY 70 MINUTES: CPT | Performed by: FAMILY MEDICINE

## 2018-10-17 PROCEDURE — 82805 BLOOD GASES W/O2 SATURATION: CPT | Performed by: EMERGENCY MEDICINE

## 2018-10-17 RX ORDER — SODIUM CHLORIDE FOR INHALATION 0.9 %
12 VIAL, NEBULIZER (ML) INHALATION ONCE
Status: COMPLETED | OUTPATIENT
Start: 2018-10-17 | End: 2018-10-17

## 2018-10-17 RX ORDER — IPRATROPIUM BROMIDE AND ALBUTEROL SULFATE 2.5; .5 MG/3ML; MG/3ML
3 SOLUTION RESPIRATORY (INHALATION) EVERY 6 HOURS PRN
Status: DISCONTINUED | OUTPATIENT
Start: 2018-10-17 | End: 2018-10-22 | Stop reason: HOSPADM

## 2018-10-17 RX ORDER — HEPARIN SODIUM 5000 [USP'U]/ML
5000 INJECTION, SOLUTION INTRAVENOUS; SUBCUTANEOUS EVERY 8 HOURS SCHEDULED
Status: DISCONTINUED | OUTPATIENT
Start: 2018-10-17 | End: 2018-10-22 | Stop reason: HOSPADM

## 2018-10-17 RX ORDER — BISACODYL 10 MG
10 SUPPOSITORY, RECTAL RECTAL DAILY
Status: DISCONTINUED | OUTPATIENT
Start: 2018-10-18 | End: 2018-10-22 | Stop reason: HOSPADM

## 2018-10-17 RX ORDER — OXYMETAZOLINE HYDROCHLORIDE 0.05 G/100ML
1 SPRAY NASAL ONCE
Status: COMPLETED | OUTPATIENT
Start: 2018-10-17 | End: 2018-10-17

## 2018-10-17 RX ORDER — METHYLPREDNISOLONE SODIUM SUCCINATE 125 MG/2ML
125 INJECTION, POWDER, LYOPHILIZED, FOR SOLUTION INTRAMUSCULAR; INTRAVENOUS ONCE
Status: COMPLETED | OUTPATIENT
Start: 2018-10-17 | End: 2018-10-17

## 2018-10-17 RX ORDER — LORATADINE 10 MG/1
10 TABLET ORAL DAILY
COMMUNITY
End: 2019-03-20 | Stop reason: HOSPADM

## 2018-10-17 RX ORDER — FERROUS SULFATE 325(65) MG
325 TABLET ORAL 2 TIMES DAILY WITH MEALS
Status: DISCONTINUED | OUTPATIENT
Start: 2018-10-18 | End: 2018-10-22 | Stop reason: HOSPADM

## 2018-10-17 RX ORDER — LEVOTHYROXINE SODIUM 0.07 MG/1
75 TABLET ORAL DAILY
Status: DISCONTINUED | OUTPATIENT
Start: 2018-10-18 | End: 2018-10-22 | Stop reason: HOSPADM

## 2018-10-17 RX ORDER — ECHINACEA PURPUREA EXTRACT 125 MG
1 TABLET ORAL 4 TIMES DAILY PRN
Status: DISCONTINUED | OUTPATIENT
Start: 2018-10-17 | End: 2018-10-22 | Stop reason: HOSPADM

## 2018-10-17 RX ORDER — OXYBUTYNIN CHLORIDE 5 MG/1
5 TABLET, EXTENDED RELEASE ORAL DAILY
Status: DISCONTINUED | OUTPATIENT
Start: 2018-10-18 | End: 2018-10-22 | Stop reason: HOSPADM

## 2018-10-17 RX ORDER — ACETAMINOPHEN 325 MG/1
650 TABLET ORAL EVERY 6 HOURS PRN
Status: DISCONTINUED | OUTPATIENT
Start: 2018-10-17 | End: 2018-10-18

## 2018-10-17 RX ADMIN — OXYMETAZOLINE HYDROCHLORIDE 1 SPRAY: 5 SPRAY NASAL at 17:15

## 2018-10-17 RX ADMIN — ISODIUM CHLORIDE 12 ML: 0.03 SOLUTION RESPIRATORY (INHALATION) at 16:05

## 2018-10-17 RX ADMIN — HEPARIN SODIUM 5000 UNITS: 5000 INJECTION, SOLUTION INTRAVENOUS; SUBCUTANEOUS at 21:22

## 2018-10-17 RX ADMIN — ALBUTEROL SULFATE 10 MG: 2.5 SOLUTION RESPIRATORY (INHALATION) at 16:04

## 2018-10-17 RX ADMIN — IPRATROPIUM BROMIDE 1 MG: 0.5 SOLUTION RESPIRATORY (INHALATION) at 16:05

## 2018-10-17 RX ADMIN — METHYLPREDNISOLONE SODIUM SUCCINATE 125 MG: 125 INJECTION, POWDER, FOR SOLUTION INTRAMUSCULAR; INTRAVENOUS at 16:03

## 2018-10-17 RX ADMIN — IPRATROPIUM BROMIDE AND ALBUTEROL SULFATE 3 ML: .5; 3 SOLUTION RESPIRATORY (INHALATION) at 21:07

## 2018-10-17 NOTE — ASSESSMENT & PLAN NOTE
In the patient with baseline ambulatory dysfunction, however, per nursing home patient is usually able to transfer  Will obtain PT/OT consultation  No significant findings on CT scan the brain, will consider MRI imaging tomorrow

## 2018-10-17 NOTE — H&P
H&P- [place] 1/3/1924, 80 y o  female MRN: [id number]    Unit/Bed#: J[facility]80 Encounter: [id number]    Primary Care Provider: Maira Rosas DO   Date and time admitted to hospital: 10/17/2018  2:41 PM        Acute encephalopathy   Assessment & Plan    Noted by nursing staff - no known history of dementia and usually alert and oriented x3  On my encounter the patient is oriented x3 so appears at her baseline  It is unclear if the patient was wearing her oxygen that she is supposed to wear 24 hours at the time the mental status change  Will check TSH and monitor electrolytes  A CT scan of the head is unremarkable and will consider an MRI in the morning - there are no new focal deficits, old RLE weakness since birth but patient during incident was also c/o bilateral upper extremity numbness     * Generalized weakness   Assessment & Plan    In the patient with baseline ambulatory dysfunction, however, per nursing home patient is usually able to transfer  Will obtain PT/OT consultation  No significant findings on CT scan the brain, will consider MRI imaging tomorrow     Slow transit constipation   Assessment & Plan    Chronic, continue bowel regimen  Monitor bowel movements     Presbycusis   Assessment & Plan    Chronic  Bilateral, severe, likely age-related       Other specified hypothyroidism   Assessment & Plan    Will check TSH  Continue levothyroxine     Chronic respiratory failure with hypoxia [facility])   Assessment & Plan    Patient was noted to be 87% on room air when picked up by EMS  Patient noted for significant wheezing however this is patient's baseline per nursing home staff  No recorded history of asthma or COPD  No concerning findings on chest xray  Place on respiratory protocol - noted that patient uses DuoNeb p r n   At the nursing home  Unclear reason for patient having been on room air at the time of EMS examination, patient uses 2 L via nasal cannula at all times         VTE Prophylaxis: Heparin  / sequential compression device   Code Status: DNR  POLST: There is no POLST form on file for this patient (pre-hospital)  Discussion with family: Attempted to call friend/KATHRYN Jules    Anticipated Length of Stay:  Patient will be admitted on an Observation basis with an anticipated length of stay of less than 2 midnights  Justification for Hospital Stay: need for close monitoring, diagnostic workup    Total Time for Visit, including Counseling / Coordination of Care: 1 hour  Greater than 50% of this total time spent on direct patient counseling and coordination of care  Chief Complaint:   Confusion, weakness    History of Present Illness: Biju Braxton is a 80 y o  female with history of chronic respiratory failure on 2 L via nasal cannula, ambulatory dysfunction, chronic right lower extremity weakness and hypothyroidism who presents with confusion and generalized weakness as noted by [facility] nursing home staff  Per nursing staff, the patient usually is alert and oriented x3 and the mental status change was unlike her  The patient was also noted for lower extremity weakness and inability to transfer, which she can usually do  The patient was also complaining of bilateral upper extremity weakness at that time  The EMS was called and the patient was noted to be hypoxic at 87% on room air, it was unclear why the patient was on room air although she is to wear her oxygen at all times  On my encounter with the patient her symptoms had resolved and she appears at her baseline mental status, she is able to provide history  She reports a 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  She denies vision changed  She reports baseline hearing loss  She denies upper extremity weakness or confusion  She admits to difficulty breathing which may be slightly worse from usual, however, she is unsure  She denies chills or fever    She denies nausea vomiting or diarrhea and has chronic constipation  She denies chest pain or palpitations  Review of Systems:    Review of Systems   Constitutional: Positive for activity change and fatigue  Negative for chills  HENT: Positive for congestion and rhinorrhea  Eyes: Negative for visual disturbance  Respiratory: Positive for shortness of breath and wheezing  Cardiovascular: Negative for chest pain, palpitations and leg swelling  Gastrointestinal: Positive for constipation  Negative for abdominal pain, diarrhea, nausea and vomiting  Endocrine: Negative for polydipsia and polyuria  Genitourinary: Negative for dysuria  Musculoskeletal: Positive for gait problem  Skin: Negative for color change  Neurological: Positive for weakness and numbness  Negative for dizziness  Psychiatric/Behavioral: Positive for confusion  Past Medical and Surgical History:     Past Medical History:   Diagnosis Date    Anemia     Arthritis     Cancer ([facility] [address] )     breast    Disease of thyroid gland     Hyperlipidemia     IBS (irritable bowel syndrome)     Overactive bladder     Peripheral neuropathy     Polio     Poliomyelitis     Renal disorder     Rhabdomyolysis     UTI (urinary tract infection)     Ventral hernia        Past Surgical History:   Procedure Laterality Date    APPENDECTOMY      BREAST SURGERY      right mastectomy       Meds/Allergies:    Prior to Admission medications    Medication Sig Start Date End Date Taking?  Authorizing Provider   acetaminophen (TYLENOL) 325 mg tablet Take 650 mg by mouth every 6 (six) hours as needed for mild pain    Historical Provider, MD   bisacodyl (DULCOLAX) 10 mg suppository Insert 10 mg into the rectum daily    Historical Provider, MD   collagenase (SANTYL) ointment Apply topically 3 (three) times a day    Historical Provider, MD   ferrous sulfate 325 (65 Fe) mg tablet Take 325 mg by mouth 2 (two) times a day with meals    Historical Provider, MD   levothyroxine 75 mcg tablet Take 75 mcg by mouth daily    Historical Provider, MD   magnesium hydroxide (MILK OF MAGNESIA) 400 mg/5 mL oral suspension Take by mouth daily as needed for constipation    Historical Provider, MD   Multiple Vitamin (MULTIVITAMIN) tablet Take 1 tablet by mouth daily    Historical Provider, MD   sodium chloride (OCEAN) 0 65 % nasal spray 1 spray into each nostril 4 (four) times a day as needed for congestion    Historical Provider, MD   tolterodine (DETROL LA) 4 mg 24 hr capsule Take 4 mg by mouth daily    Historical Provider, MD     I have reviewed home medications with a medical source (PCP, Pharmacy, other)  Allergies: No Known Allergies    Social History:     Marital Status: Single   Occupation: retired  Patient Pre-hospital Living Situation: lives at nursing home  Patient Pre-hospital Level of Mobility: walker  Patient Pre-hospital Diet Restrictions: none  Substance Use History:   History   Alcohol Use No     History   Smoking Status    Never Smoker   Smokeless Tobacco    Never Used     History   Drug Use No       Family History:    non-contributory    Physical Exam:     Vitals:   Blood Pressure: 132/60 (10/17/18 1730)  Pulse: 87 (10/17/18 1730)  Temperature: 98 1 °F (36 7 °C) (10/17/18 1436)  Temp Source: Temporal (10/17/18 1436)  Respirations: 20 (10/17/18 1730)  Height: 5' 5" (165 1 cm) (10/17/18 1436)  Weight - Scale: 76 2 kg (167 lb 15 9 oz) (10/17/18 1436)  SpO2: 96 % (10/17/18 1730)    Physical Exam   Constitutional: She is oriented to person, place, and time  No distress  HENT:   Head: Normocephalic and atraumatic  Eyes: Conjunctivae are normal    Neck: No JVD present  Cardiovascular: Normal rate and regular rhythm  No murmur heard  Pulmonary/Chest: No respiratory distress  She has wheezes  She has no rales  Abdominal: Soft  She exhibits no distension  There is no tenderness  There is no guarding  Musculoskeletal: She exhibits no edema     Neurological: She is alert and oriented to person, place, and time  No cranial nerve deficit  RLE weakness - unable to lift against gravity    Hearing deficit b/l   Skin:   Scaly skin b/l LE R>L   Psychiatric: She has a normal mood and affect  Additional Data:     Lab Results: I have personally reviewed pertinent reports  Results from last 7 days  Lab Units 10/17/18  1552   WBC Thousand/uL 7 19   HEMOGLOBIN g/dL 11 7   HEMATOCRIT % 37 8   PLATELETS Thousands/uL 313   NEUTROS ABS Thousands/µL 4 57   NEUTROS PCT % 63   LYMPHS PCT % 24   MONOS PCT % 6   EOS PCT % 6       Results from last 7 days  Lab Units 10/17/18  1552   SODIUM mmol/L 145   POTASSIUM mmol/L 4 5   CHLORIDE mmol/L 107   CO2 mmol/L 33*   BUN mg/dL 24   CREATININE mg/dL 1 30   ANION GAP mmol/L 5   CALCIUM mg/dL 8 8   ALBUMIN g/dL 2 7*   TOTAL BILIRUBIN mg/dL 0 20   ALK PHOS U/L 93   ALT U/L 19   AST U/L 21                     Imaging: I have personally reviewed pertinent reports  CT head without contrast   ED Interpretation by Trinh Hicks MD (10/17 1620)   CT ordered by myself and the report from radiologist has been reviewed  Final Result by Debria Hatchet, MD (10/17 1546)      No acute intracranial abnormality  Bilateral cerebellar calcifications can be seen in the setting of hyperthyroidism  Sinus disease  Workstation performed: [id number]         XR chest 2 views   ED Interpretation by Trinh Hicks MD (10/17 1536)   The CXR was ordered by me and interpreted by me independently  On my read, it appears:   - kyphosis   - hiatal hernia, small   - no obvious pneumonia      Final Result by ZUHAIR Costa MD (10/17 1263)      No acute cardiopulmonary disease              Workstation performed: [id number]         MRI inpatient order    (Results Pending)       EKG, Pathology, and Other Studies Reviewed on Admission:   · EKG: ordered    Allscripts / Epic Records Reviewed: Yes     ** Please Note: This note has been constructed using a voice recognition system   **

## 2018-10-17 NOTE — ASSESSMENT & PLAN NOTE
Patient was noted to be 87% on room air when picked up by EMS  Patient noted for significant wheezing however this is patient's baseline per nursing home staff  No recorded history of asthma or COPD  No concerning findings on chest xray  Place on respiratory protocol - noted that patient uses DuoNeb p r n   At the nursing home  Unclear reason for patient having been on room air at the time of EMS examination, patient uses 2 L via nasal cannula at all times

## 2018-10-17 NOTE — ED PROVIDER NOTES
Final Diagnosis:  1  Cough    2  Nasal congestion    3  Dyspnea    4  Slurred speech, per nursing home    5  Hypoxia, per EMS      Chief Complaint   Patient presents with    Altered Mental Status     Patient presents from [facility] for altered mental status and slurred speech since 0600  This is a 80year old female presenting for evaluation of either dyspnea or speech abnormality  The patient is a very poor historian  She is in a nursing home, since maybe this morning she has been having increased lower extremity bilateral numbness sensation and weakness and maybe her speech is a little bit different than normal   She always has a raspy voice but maybe a little bit different?    Further, the patient having increased shortness of breath, coughing that is productive of thick green sputum  It sounds like this is much more recent but I can't tell how many days  She has no fevers or chills, she has no new oxygen requirement  Per EMS the patient is normally on 2 L nasal cannula, and was saturating 87% on room air  The patient denies being on chronic oxygen supplementation though  She denies dizziness lightheadedness  She does have chest pain every now and then but can't tell me if it is new or getting worse  She denies any falls or injuries  She does state that both her legs feel incredibly heavy when she is trying to walk  They do feel more swollen than they normally do  She does not believe she has a history of heart failure but she is uncertain  She chronically has right lower extremity weakness secondary to polio and this is not new  She chronically has right lower extremity shortening since childhood  She also has a ventral hernia since childhood that is unchanged  PMH:  - Polio  - hypothyroidism  - CKD  - IBS  - HLD  - Cancer?   - Ventral hernia  PSH:  - Appendectomy  - Breast surgery  Former smoker  No alcohol  No drugs  PE:   Vitals:    10/17/18 1800 10/17/18 1900 10/17/18 2108 10/17/18 2123   BP: 135/63 115/68     BP Location: Right arm Left arm     Pulse: 93 90  93   Resp: 20 20  18   Temp:  98 1 °F (36 7 °C)     TempSrc:  Temporal     SpO2: 96% 95% 95% 95%   Weight:  73 2 kg (161 lb 6 oz)     Height:  5' 2" (1 575 m)     General: VSS, NAD, awake, alert  Well-nourished, well-developed  Appears stated age  Head: Normocephalic, atraumatic, nontender  Eyes: PERRL, EOM-I  No diplopia  No hyphema  No subconjunctival hemorrhages  Symmetrical lids  ENT: Atraumatic external nose and ears  Pharynx normal    MMM  No malocclusion  No stridor  Normal phonation  No drooling  Normal swallowing  Base of mouth is soft  No mastoid tenderness  Neck: Symmetric, trachea midline  No JVD  No midline neck tenderness  CV: RRR  +S1/S2  No murmurs or gallops  Peripheral pulses +2 throughout  No chest wall tenderness  Lungs:   +labored   No retractions  Mild wheezing bilaterally but just overall poor air movement  lungs sounds equal bilateral    No crepitus  +tachypnea  No paradoxical motion  Abd: +BS, soft  Has a hernia anterior/suprapubpic (chronic since birth per patient)  No guarding  No rigidity  No rebound  No hepatosplenomegaly noted  No peritoneal signs  Heel strike negative  MSK:   FROM   No lower extremity edema  Back:   No CVAT  Skin: Dry, intact  Neuro: AAOx3, GCS 15, CN II-XII grossly intact  Motor grossly intact  Sensory grossly intact  C-spine: NT, supple  No midline tenderness  Kernig's Brudzinski's negative  EHL/FHL/PF/DF/KF/KE/HF/HE 5/5  No saddle anesthesia  2+ patellar reflexes  SLR negative  M/U/R/A nerve sensation bilateral intact and equal    strength 5/5  Good capillary refill  2+ radial pulses, bilaterally equal    BICEPS/TRICEPS 5/5  Shoulder abduction/adduction 5/5  No pronator drift  No aphasia  ?dysarthria  CN 2-12 intact  No nystagmus  No facial droop     NIH Stroke Scale Score = 0        Stroke Assessment     Row Name 10/17/18 3062 NIH Stroke Scale    Interval Baseline      Level of Consciousness (1a ) 0      LOC Questions (1b ) 0      LOC Commands (1c ) 0      Best Gaze (2 ) 0      Visual (3 ) 0      Facial Palsy (4 ) 0      Motor Arm, Left (5a ) 0      Motor Arm, Right (5b ) 0      Motor Leg, Left (6a ) 0      Motor Leg, Right (6b ) 1   chronic weakness since birth      Limb Ataxia (7 ) 0      Sensory (8 ) 0      Best Language (9 ) 0      Dysarthria (10 ) 1   unclear if new orn ot      Extinction and Inattention (11 ) (Formerly Neglect) 0      Total 2                First Filed Value   TPA Decision  Patient not a TPA candidate  Patient is not a candidate options  Unclear time of onset outside appropriate time window  Psychiatric/Behavioral: Appropriate mood and affect   Exam: deferred  A:  - Dysarthria? ?  - Increased coughing productive of sputum  - SOB  P:  - non-acute stroke workup --> minor deficits, not TPA candidate    - Labs  - COPD w/u and management  - likely admit for observation  - 13 point ROS was performed and all are normal unless stated in the history above  - Nursing note reviewed  Vitals reviewed  - Orders placed by myself and/or advanced practitioner / resident     - Previous chart was reviewed  - No language barrier    - History obtained from patient, EMS  - There are limitations to the history obtained  Reasons ROS could not be obtained: patient mildly demented  - Critical care time: Not applicable for this patient  - Patient does not need initiation of IV thrombolytics: Last Known well was unknown    HEART Score = [5]  [0] History = Highly / Moderately / Slightly Suspicious  [1] EKG = Significant STD / Non-specific repolarization / Normal  [2] Age = >65, 45-65, <45  [2] Risk Factors (0, 1-2, 3+): Cholesterol, HTN, DM, Cigarettes, FH, Obesity  [0] Troponin: 3+ x normal, 1-3x normal, <normal    Moderate Score (4-6 points), risk of MACE of 12-16 6%      ED Course as of Oct 17 2253   Wed Oct 17, 2018 1620 pCO2, Maverick: (!) 67 1   1620 WBC: 7 19   1620 SL AMB HEMOGLOBIN: 11 7   1620 Platelet Count: 560   6825 I reviewed all testing with the patient  I discussed that I think she likely just has a very bad URI  Her lungs sound significantly better than she did on arrival but she states that she is still coughing up thick secretions  She says she is short of breath but it is better than before  She also states that she has significant nasal congestion and that her primary complaint now  Will discussed with alhaji, likely admit for TIA pathway although again I think her likely pathology is just URI in an elderly female/rhinosinusitis  Med surge tele    1812 Procedure Note: EKG  Date/Time: 10/17/18 6:12 PM   Performed by: Yoshi Muse  Authorized by: Yoshi Muse   Indications / Diagnosis: CVA w/u  ECG reviewed by me, the ED Provider: yes   The EKG demonstrates:  Rhythm: HR 93 normal sinus  Intervals: normal intervals  Axis: normal axis  QRS/Blocks: RBBB  ST Changes: No acute ST Changes, no STD/JUANJO  Similar to previous        Medications   acetaminophen (TYLENOL) tablet 650 mg (not administered)   bisacodyl (DULCOLAX) rectal suppository 10 mg (not administered)   collagenase (SANTYL) ointment ( Topical Not Given 10/17/18 2120)   ferrous sulfate tablet 325 mg (not administered)   levothyroxine tablet 75 mcg (not administered)   magnesium hydroxide (MILK OF MAGNESIA) 400 mg/5 mL oral suspension 5 mL (not administered)   sodium chloride (OCEAN) 0 65 % nasal spray 1 spray (not administered)   oxybutynin (DITROPAN-XL) 24 hr tablet 5 mg (not administered)   heparin (porcine) subcutaneous injection 5,000 Units (5,000 Units Subcutaneous Given 10/17/18 2122)   ipratropium-albuterol (DUO-NEB) 0 5-2 5 mg/3 mL inhalation solution 3 mL (3 mL Nebulization Given 10/17/18 2107)    EMS REPLENISHMENT MED ( Does not apply Given to EMS 10/17/18 1715)   methylPREDNISolone sodium succinate (Solu-MEDROL) injection 125 mg (125 mg Intravenous Given 10/17/18 1603)   albuterol inhalation solution 10 mg (10 mg Nebulization Given 10/17/18 1604)   ipratropium (ATROVENT) 0 02 % inhalation solution 1 mg (1 mg Nebulization Given 10/17/18 1605)   sodium chloride 0 9 % inhalation solution 12 mL (12 mL Nebulization Given 10/17/18 1605)   oxymetazoline (AFRIN) 0 05 % nasal spray 1 spray (1 spray Each Nare Given 10/17/18 1715)     CT head without contrast   ED Interpretation   CT ordered by myself and the report from radiologist has been reviewed  Final Result      No acute intracranial abnormality  Bilateral cerebellar calcifications can be seen in the setting of hyperthyroidism  Sinus disease  Workstation performed: [id number]         XR chest 2 views   ED Interpretation   The CXR was ordered by me and interpreted by me independently  On my read, it appears:   - kyphosis   - hiatal hernia, small   - no obvious pneumonia      Final Result      No acute cardiopulmonary disease  Workstation performed: [id number]         MRI inpatient order    (Results Pending)     Orders Placed This Encounter   Procedures    CT head without contrast    XR chest 2 views    MRI inpatient order    CBC and differential    Comprehensive metabolic panel    Blood gas, venous    Troponin I    B-type natriuretic peptide    UA w Reflex to Microscopic w Reflex to Culture    Basic metabolic panel    CBC (With Platelets)    Platelet count    Diet Regular; Regular House; Dysphagia 3-Dental Soft; Thin Liquid    Insert peripheral IV    Nursing communcation Continue IV as ordered     Latisha Courser Notify admitting physician    Notify admitting physician on arrival    24 Hour Telemetry Monitoring    Nursing oxygen orders / instructions    Activity as tolerated    Bathroom privileges    Vital Signs per unit routine    Up and OOB as tolerated    Insert peripheral IV    Maintain IV access    Place sequential compression device  Level 3 - DNAR (Do Not Attempt Resuscitation) and DNI (Do Not Intubate)    Inpatient consult to Case Management    OT eval and treat    PT eval and treat    Respiratory Protocol    ECG 12 lead    ECG 12 lead    Place in Observation (expected length of stay for this patient is less than two midnights)     Labs Reviewed   CBC AND DIFFERENTIAL - Abnormal        Result Value Ref Range Status    WBC 7 19  4 31 - 10 16 Thousand/uL Final    RBC 3 76 (*) 3 81 - 5 12 Million/uL Final    Hemoglobin 11 7  11 5 - 15 4 g/dL Final    Hematocrit 37 8  34 8 - 46 1 % Final     (*) 82 - 98 fL Final    MCH 31 1  26 8 - 34 3 pg Final    MCHC 31 0 (*) 31 4 - 37 4 g/dL Final    RDW 13 8  11 6 - 15 1 % Final    MPV 9 1  8 9 - 12 7 fL Final    Platelets 257  904 - 390 Thousands/uL Final    nRBC 0  /100 WBCs Final    Neutrophils Relative 63  43 - 75 % Final    Immat GRANS % 0  0 - 2 % Final    Lymphocytes Relative 24  14 - 44 % Final    Monocytes Relative 6  4 - 12 % Final    Eosinophils Relative 6  0 - 6 % Final    Basophils Relative 1  0 - 1 % Final    Neutrophils Absolute 4 57  1 85 - 7 62 Thousands/µL Final    Immature Grans Absolute 0 03  0 00 - 0 20 Thousand/uL Final    Lymphocytes Absolute 1 72  0 60 - 4 47 Thousands/µL Final    Monocytes Absolute 0 41  0 17 - 1 22 Thousand/µL Final    Eosinophils Absolute 0 42  0 00 - 0 61 Thousand/µL Final    Basophils Absolute 0 04  0 00 - 0 10 Thousands/µL Final   COMPREHENSIVE METABOLIC PANEL - Abnormal     Sodium 145  136 - 145 mmol/L Final    Potassium 4 5  3 5 - 5 3 mmol/L Final    Chloride 107  100 - 108 mmol/L Final    CO2 33 (*) 21 - 32 mmol/L Final    ANION GAP 5  4 - 13 mmol/L Final    BUN 24  5 - 25 mg/dL Final    Creatinine 1 30  0 60 - 1 30 mg/dL Final    Comment: Standardized to IDMS reference method    Glucose 89  65 - 140 mg/dL Final    Comment:   If the patient is fasting, the ADA then defines impaired fasting glucose as > 100 mg/dL and diabetes as > or equal to 123 mg/dL  Specimen collection should occur prior to Sulfasalazine administration due to the potential for falsely depressed results  Specimen collection should occur prior to Sulfapyridine administration due to the potential for falsely elevated results  Calcium 8 8  8 3 - 10 1 mg/dL Final    AST 21  5 - 45 U/L Final    Comment:   Specimen collection should occur prior to Sulfasalazine administration due to the potential for falsely depressed results  ALT 19  12 - 78 U/L Final    Comment:   Specimen collection should occur prior to Sulfasalazine administration due to the potential for falsely depressed results  Alkaline Phosphatase 93  46 - 116 U/L Final    Total Protein 7 1  6 4 - 8 2 g/dL Final    Albumin 2 7 (*) 3 5 - 5 0 g/dL Final    Total Bilirubin 0 20  0 20 - 1 00 mg/dL Final    eGFR 35  ml/min/1 73sq m Final    Narrative:     National Kidney Disease Education Program recommendations are as follows:  GFR calculation is accurate only with a steady state creatinine  Chronic Kidney disease less than 60 ml/min/1 73 sq  meters  Kidney failure less than 15 ml/min/1 73 sq  meters  BLOOD GAS, VENOUS - Abnormal     pH, Maverick 7 314  7 300 - 7 400 Final    pCO2, Maverick 67 1 (*) 42 0 - 50 0 mm Hg Final    pO2, Maverick 47 0 (*) 35 0 - 45 0 mm Hg Final    HCO3, Maverick 33 3 (*) 24 - 30 mmol/L Final    Base Excess, Maverick 5 2  mmol/L Final    O2 Content, Maverick 14 2  ml/dL Final    O2 HGB, VENOUS 79 5  60 0 - 80 0 % Final   TROPONIN I - Normal    Troponin I <0 02  <=0 04 ng/mL Final    Comment: 3Autovalidation override  [employer] Chemistry analyzer 99% cutoff is > 0 04 ng/mL in network labs     o cTnI 99% cutoff is useful only when applied to patients in the clinical setting of myocardial ischemia   o cTnI 99% cutoff should be interpreted in the context of clinical history, ECG findings and possibly cardiac imaging to establish correct diagnosis     o cTnI 99% cutoff may be suggestive but clearly not indicative of a coronary event without the clinical setting of myocardial ischemia  NT-BNP PRO (BRAIN NATRIURETIC PEPTIDE) - Normal    NT-proBNP 356  <450 pg/mL Final   UA W REFLEX TO MICROSCOPIC WITH REFLEX TO CULTURE     Time reflects when diagnosis was documented in both MDM as applicable and the Disposition within this note     Time User Action Codes Description Comment    10/17/2018  4:49 PM Ronette Lent Add [R05] Cough     10/17/2018  4:49 PM Merary Barrette [R09 81] Nasal congestion     10/17/2018  4:49 PM Ronette Lent Add [R06 00] Dyspnea     10/17/2018  4:50 PM Ronette Lent Add [R47 81] Slurred speech     10/17/2018  4:50 PM Ronette Lent Modify [R47 81] Slurred speech, per nursing home     10/17/2018  4:50 PM Ronette Lent Add [R09 02] Hypoxia     10/17/2018  4:50 PM Ronette Lent Modify [R09 02] Hypoxia, per EMS       ED Disposition     ED Disposition Condition Comment    Admit  Case was discussed with Stefania and the patient's admission status was agreed to be Admission Status: observation status to the service of Dr David Davis           Follow-up Information    None       Current Discharge Medication List      CONTINUE these medications which have NOT CHANGED    Details   acetaminophen (TYLENOL) 325 mg tablet Take 650 mg by mouth every 6 (six) hours as needed for mild pain      bisacodyl (DULCOLAX) 10 mg suppository Insert 10 mg into the rectum daily      collagenase (SANTYL) ointment Apply topically 3 (three) times a day      ferrous sulfate 325 (65 Fe) mg tablet Take 325 mg by mouth 2 (two) times a day with meals      levothyroxine 75 mcg tablet Take 88 mcg by mouth daily        loratadine (CLARITIN) 10 mg tablet Take 10 mg by mouth daily      magnesium hydroxide (MILK OF MAGNESIA) 400 mg/5 mL oral suspension Take by mouth daily as needed for constipation      Multiple Vitamin (MULTIVITAMIN) tablet Take 1 tablet by mouth daily      sodium chloride (OCEAN) 0 65 % nasal spray 1 spray into each nostril 4 (four) times a day as needed for congestion      tolterodine (DETROL LA) 4 mg 24 hr capsule Take 4 mg by mouth daily           No discharge procedures on file  Prior to Admission Medications   Prescriptions Last Dose Informant Patient Reported? Taking? Multiple Vitamin (MULTIVITAMIN) tablet 10/17/2018 at Unknown time  Yes Yes   Sig: Take 1 tablet by mouth daily   acetaminophen (TYLENOL) 325 mg tablet Past Week at Unknown time  Yes Yes   Sig: Take 650 mg by mouth every 6 (six) hours as needed for mild pain   bisacodyl (DULCOLAX) 10 mg suppository Past Month at Unknown time  Yes Yes   Sig: Insert 10 mg into the rectum daily   collagenase (SANTYL) ointment 10/16/2018 at Unknown time  Yes Yes   Sig: Apply topically 3 (three) times a day   ferrous sulfate 325 (65 Fe) mg tablet 10/17/2018 at Unknown time  Yes Yes   Sig: Take 325 mg by mouth 2 (two) times a day with meals   levothyroxine 75 mcg tablet 10/17/2018 at Unknown time  Yes Yes   Sig: Take 88 mcg by mouth daily     loratadine (CLARITIN) 10 mg tablet   Yes Yes   Sig: Take 10 mg by mouth daily   magnesium hydroxide (MILK OF MAGNESIA) 400 mg/5 mL oral suspension Past Month at Unknown time  Yes Yes   Sig: Take by mouth daily as needed for constipation   sodium chloride (OCEAN) 0 65 % nasal spray 10/17/2018 at Unknown time  Yes Yes   Si spray into each nostril 4 (four) times a day as needed for congestion   tolterodine (DETROL LA) 4 mg 24 hr capsule 10/17/2018 at Unknown time  Yes Yes   Sig: Take 4 mg by mouth daily      Facility-Administered Medications: None       Portions of the record may have been created with voice recognition software  Occasional wrong word or "sound a like" substitutions may have occurred due to the inherent limitations of voice recognition software  Read the chart carefully and recognize, using context, where substitutions have occurred      Electronically signed by:  Bharat Najera MD  10/17/18 [facility] MedicMD latricia  10/17/18 [id number]

## 2018-10-17 NOTE — ASSESSMENT & PLAN NOTE
Noted by nursing staff - no known history of dementia and usually alert and oriented x3  On my encounter the patient is oriented x3 so appears at her baseline  It is unclear if the patient was wearing her oxygen that she is supposed to wear 24 hours at the time the mental status change  Will check TSH and monitor electrolytes  A CT scan of the head is unremarkable and will consider an MRI in the morning - there are no new focal deficits, old RLE weakness since birth but patient during incident was also c/o bilateral upper extremity numbness

## 2018-10-18 ENCOUNTER — APPOINTMENT (INPATIENT)
Dept: RADIOLOGY | Facility: HOSPITAL | Age: 83
DRG: 070 | End: 2018-10-18
Payer: COMMERCIAL

## 2018-10-18 ENCOUNTER — APPOINTMENT (INPATIENT)
Dept: MRI IMAGING | Facility: HOSPITAL | Age: 83
DRG: 070 | End: 2018-10-18
Payer: COMMERCIAL

## 2018-10-18 PROBLEM — R50.9 FEVER: Status: ACTIVE | Noted: 2018-10-18

## 2018-10-18 LAB
ANION GAP SERPL CALCULATED.3IONS-SCNC: 5 MMOL/L (ref 4–13)
BACTERIA UR QL AUTO: ABNORMAL /HPF
BILIRUB UR QL STRIP: NEGATIVE
BUN SERPL-MCNC: 27 MG/DL (ref 5–25)
CALCIUM SERPL-MCNC: 9 MG/DL (ref 8.3–10.1)
CHLORIDE SERPL-SCNC: 107 MMOL/L (ref 100–108)
CLARITY UR: ABNORMAL
CO2 SERPL-SCNC: 32 MMOL/L (ref 21–32)
COLOR UR: YELLOW
CREAT SERPL-MCNC: 1.28 MG/DL (ref 0.6–1.3)
ERYTHROCYTE [DISTWIDTH] IN BLOOD BY AUTOMATED COUNT: 13.6 % (ref 11.6–15.1)
FLUAV AG SPEC QL: NORMAL
FLUBV AG SPEC QL: NORMAL
GFR SERPL CREATININE-BSD FRML MDRD: 36 ML/MIN/1.73SQ M
GLUCOSE SERPL-MCNC: 153 MG/DL (ref 65–140)
GLUCOSE UR STRIP-MCNC: NEGATIVE MG/DL
HCT VFR BLD AUTO: 36 % (ref 34.8–46.1)
HGB BLD-MCNC: 11.3 G/DL (ref 11.5–15.4)
HGB UR QL STRIP.AUTO: ABNORMAL
KETONES UR STRIP-MCNC: NEGATIVE MG/DL
LACTATE SERPL-SCNC: 1.1 MMOL/L (ref 0.5–2)
LEUKOCYTE ESTERASE UR QL STRIP: ABNORMAL
MCH RBC QN AUTO: 30.7 PG (ref 26.8–34.3)
MCHC RBC AUTO-ENTMCNC: 31.4 G/DL (ref 31.4–37.4)
MCV RBC AUTO: 98 FL (ref 82–98)
NITRITE UR QL STRIP: POSITIVE
NON-SQ EPI CELLS URNS QL MICRO: ABNORMAL /HPF
PH UR STRIP.AUTO: 8 [PH] (ref 4.5–8)
PLATELET # BLD AUTO: 339 THOUSANDS/UL (ref 149–390)
PMV BLD AUTO: 9.1 FL (ref 8.9–12.7)
POTASSIUM SERPL-SCNC: 4.8 MMOL/L (ref 3.5–5.3)
PROCALCITONIN SERPL-MCNC: 0.1 NG/ML
PROT UR STRIP-MCNC: ABNORMAL MG/DL
RBC # BLD AUTO: 3.68 MILLION/UL (ref 3.81–5.12)
RBC #/AREA URNS AUTO: ABNORMAL /HPF
RSV B RNA SPEC QL NAA+PROBE: NORMAL
SODIUM SERPL-SCNC: 144 MMOL/L (ref 136–145)
SP GR UR STRIP.AUTO: 1.01 (ref 1–1.03)
UROBILINOGEN UR QL STRIP.AUTO: 0.2 E.U./DL
WBC # BLD AUTO: 10.26 THOUSAND/UL (ref 4.31–10.16)
WBC #/AREA URNS AUTO: ABNORMAL /HPF

## 2018-10-18 PROCEDURE — 85027 COMPLETE CBC AUTOMATED: CPT | Performed by: FAMILY MEDICINE

## 2018-10-18 PROCEDURE — 70551 MRI BRAIN STEM W/O DYE: CPT

## 2018-10-18 PROCEDURE — 84145 PROCALCITONIN (PCT): CPT | Performed by: PHYSICIAN ASSISTANT

## 2018-10-18 PROCEDURE — 80048 BASIC METABOLIC PNL TOTAL CA: CPT | Performed by: FAMILY MEDICINE

## 2018-10-18 PROCEDURE — 87631 RESP VIRUS 3-5 TARGETS: CPT | Performed by: FAMILY MEDICINE

## 2018-10-18 PROCEDURE — 71045 X-RAY EXAM CHEST 1 VIEW: CPT

## 2018-10-18 PROCEDURE — 81001 URINALYSIS AUTO W/SCOPE: CPT | Performed by: EMERGENCY MEDICINE

## 2018-10-18 PROCEDURE — 94760 N-INVAS EAR/PLS OXIMETRY 1: CPT

## 2018-10-18 PROCEDURE — 94660 CPAP INITIATION&MGMT: CPT

## 2018-10-18 PROCEDURE — 87086 URINE CULTURE/COLONY COUNT: CPT | Performed by: EMERGENCY MEDICINE

## 2018-10-18 PROCEDURE — 99232 SBSQ HOSP IP/OBS MODERATE 35: CPT | Performed by: PHYSICIAN ASSISTANT

## 2018-10-18 PROCEDURE — 87040 BLOOD CULTURE FOR BACTERIA: CPT | Performed by: FAMILY MEDICINE

## 2018-10-18 PROCEDURE — 83605 ASSAY OF LACTIC ACID: CPT | Performed by: PHYSICIAN ASSISTANT

## 2018-10-18 PROCEDURE — 94664 DEMO&/EVAL PT USE INHALER: CPT

## 2018-10-18 RX ORDER — AZITHROMYCIN 250 MG/1
250 TABLET, FILM COATED ORAL EVERY 24 HOURS
Status: DISCONTINUED | OUTPATIENT
Start: 2018-10-19 | End: 2018-10-19

## 2018-10-18 RX ORDER — ACETAMINOPHEN 325 MG/1
650 TABLET ORAL EVERY 6 HOURS PRN
Status: DISCONTINUED | OUTPATIENT
Start: 2018-10-18 | End: 2018-10-22 | Stop reason: HOSPADM

## 2018-10-18 RX ADMIN — ACETAMINOPHEN 650 MG: 325 TABLET, FILM COATED ORAL at 08:21

## 2018-10-18 RX ADMIN — HEPARIN SODIUM 5000 UNITS: 5000 INJECTION, SOLUTION INTRAVENOUS; SUBCUTANEOUS at 22:30

## 2018-10-18 RX ADMIN — LEVOTHYROXINE SODIUM 75 MCG: 75 TABLET ORAL at 08:21

## 2018-10-18 RX ADMIN — AZITHROMYCIN MONOHYDRATE 500 MG: 500 INJECTION, POWDER, LYOPHILIZED, FOR SOLUTION INTRAVENOUS at 09:34

## 2018-10-18 RX ADMIN — HEPARIN SODIUM 5000 UNITS: 5000 INJECTION, SOLUTION INTRAVENOUS; SUBCUTANEOUS at 15:37

## 2018-10-18 RX ADMIN — FERROUS SULFATE TAB 325 MG (65 MG ELEMENTAL FE) 325 MG: 325 (65 FE) TAB at 17:58

## 2018-10-18 RX ADMIN — HEPARIN SODIUM 5000 UNITS: 5000 INJECTION, SOLUTION INTRAVENOUS; SUBCUTANEOUS at 05:10

## 2018-10-18 RX ADMIN — FERROUS SULFATE TAB 325 MG (65 MG ELEMENTAL FE) 325 MG: 325 (65 FE) TAB at 08:21

## 2018-10-18 RX ADMIN — OXYBUTYNIN CHLORIDE 5 MG: 5 TABLET, EXTENDED RELEASE ORAL at 08:21

## 2018-10-18 RX ADMIN — IPRATROPIUM BROMIDE AND ALBUTEROL SULFATE 3 ML: .5; 3 SOLUTION RESPIRATORY (INHALATION) at 05:11

## 2018-10-18 RX ADMIN — CEFTRIAXONE 1000 MG: 1 INJECTION, SOLUTION INTRAVENOUS at 10:58

## 2018-10-18 NOTE — OCCUPATIONAL THERAPY NOTE
Occupational Therapy         Patient Name: Kelley COOPERXHSJordynY Date: 10/18/2018      Order received and chart review performed; attempted to see pt for completion of evaluation, however, pt out of room for MRI   Will attempt as able to see pt for completion of OT eval

## 2018-10-18 NOTE — PHYSICAL THERAPY NOTE
PT NOTE:            Order received and chart review performed  Attempted to see pt this pm however pt unavailable due to being out for MRI  Will continue to follow and reattempt as able

## 2018-10-18 NOTE — ASSESSMENT & PLAN NOTE
Noted by nursing staff - no known history of dementia and usually alert and oriented x3  On admission, patient is oriented x3 so appears at her baseline  It is unclear if the patient was wearing her oxygen that she is supposed to wear 24 hours at the time the mental status change, possible mental status change due to hypoxia  CT head is unremarkable  MRI head pending - there are no new focal deficits, old RLE weakness  However, PTA incident was also c/o bilateral upper extremity numbness

## 2018-10-18 NOTE — SOCIAL WORK
Pt is a resident at [facility]  Pt is a 15 day BUTLER bedhold   Pt is alert and oriented x3 Pt uses 02 at 2 liters Pt uses a walker to ambulate at [facility]

## 2018-10-18 NOTE — UTILIZATION REVIEW
Initial Clinical Review  Admission: Date/Time/Statement:   OBSERVATION 10/17/18 @ 1655, CONVERTED TO INPATIENT ADMISSION 10/18/1/ @ 1158 FOR CONTINUED CARE & TX FOR FEVER, STARTED ON IVABT THERAPY  10/18/18 1158 Release Beth Jaeger PA-C (auto-released) From Order: [id number]   10/18/18 1158 Complete Uche Martinez    Inpatient Admission   Admitting Physician Judit Franco    Level of Care Med Surg    Estimated length of stay More than 2 Midnights    Certification I certify that inpatient services are medically necessary for this patient for a duration of greater than two midnights  See H&P and MD Progress Notes for additional information about the patient's course of treatment  ED: Date/Time/Mode of Arrival:   ED Arrival Information     Expected Arrival Acuity Means of Arrival Escorted By Service Admission Type    10/17/2018  10/17/2018 14:24 Urgent Ambulance [facility] Ambulance General Medicine Urgent    Arrival Complaint    Altered Mental Status      Chief Complaint:   Chief Complaint   Patient presents with    Altered Mental Status     Patient presents from [facility] for altered mental status and slurred speech since 0600  History of Illness:   80year old female presenting for evaluation of either dyspnea or speech abnormality  The patient is a very poor historian  She is in a nursing home, since maybe this morning she has been having increased lower extremity bilateral numbness sensation and weakness and maybe her speech is a little bit different than normal   She always has a raspy voice but maybe a little bit different?    Further, the patient having increased shortness of breath, coughing that is productive of thick green sputum  It sounds like this is much more recent but I can't tell how many days  She has no fevers or chills, she has no new oxygen requirement  Per EMS the patient is normally on 2 L nasal cannula, and was saturating 87% on room air    The patient denies being on chronic oxygen supplementation though  She denies dizziness lightheadedness  She does have chest pain every now and then but can't tell me if it is new or getting worse  She denies any falls or injuries  She does state that both her legs feel incredibly heavy when she is trying to walk  They do feel more swollen than they normally do  She does not believe she has a history of heart failure but she is uncertain  She chronically has right lower extremity weakness secondary to polio and this is not new  She chronically has right lower extremity shortening since childhood  She also has a ventral hernia since childhood that is unchanged  ED Vital Signs:   ED Triage Vitals [10/17/18 1436]   Temperature Pulse Respirations Blood Pressure SpO2   98 1 °F (36 7 °C) 85 18 152/71 98 %      Temp Source Heart Rate Source Patient Position - Orthostatic VS BP Location FiO2 (%)   Temporal Monitor Sitting Right arm --      Pain Score       No Pain        Wt Readings from Last 1 Encounters:   10/17/18 73 2 kg (161 lb 6 oz)   Vital Signs (abnormal):   T 100 9  Abnormal Labs/Diagnostic Test Results:   GLUC 153 GFR 36 WBC 10 26 HGB 11 3   CXR=No acute cardiopulmonary disease  CT HEAD=No acute intracranial abnormality  Bilateral cerebellar calcifications can be seen in the setting of hyperthyroidism  Sinus disease  CXR=No acute cardiopulmonary disease  MRI BRAIN=Mild chronic microvascular ischemic disease  No acute ischemic disease  Age-related atrophy  Metabolic or degenerative calcifications involving the globus paladi bilaterally as well as the cerebellar dentate nuclei, consistent with CT  Near complete opacification of the sphenoid cells, likely inflammatory  Mild mucosal thickening noted elsewhere within the sinuses  Consistent with CT  Continued follow-up recommended    ED Treatment:   Medication Administration from 10/17/2018 2974 to 10/17/2018 2417       Date/Time Order Dose Route Action Action by Comments 10/17/2018 1715  EMS REPLENISHMENT MED 0  Does not apply Given to EMS Hernandez Zeng RN      10/17/2018 1603 methylPREDNISolone sodium succinate (Solu-MEDROL) injection 125 mg 125 mg Intravenous Given Haleigh Trammlel RN      10/17/2018 1604 albuterol inhalation solution 10 mg 10 mg Nebulization Given Haleigh Trammell RN      10/17/2018 1605 ipratropium (ATROVENT) 0 02 % inhalation solution 1 mg 1 mg Nebulization Given Haleigh Trammell RN      10/17/2018 1605 sodium chloride 0 9 % inhalation solution 12 mL 12 mL Nebulization Given Haleigh Trammell RN      10/17/2018 1715 oxymetazoline (AFRIN) 0 05 % nasal spray 1 spray 1 spray Each Nare Given Hernandez Zeng RN       Past Medical/Surgical History:    Active Ambulatory Problems     Diagnosis Date Noted    No Active Ambulatory Problems     Resolved Ambulatory Problems     Diagnosis Date Noted    No Resolved Ambulatory Problems     Past Medical History:   Diagnosis Date    Anemia     Arthritis     Cancer ([facility] [address] )     Disease of thyroid gland     Hyperlipidemia     IBS (irritable bowel syndrome)     Overactive bladder     Peripheral neuropathy     Polio     Poliomyelitis     Renal disorder     Rhabdomyolysis     UTI (urinary tract infection)     Ventral hernia    Admitting Diagnosis: Cough [R05]  Slurred speech [R47 81]  Nasal congestion [R09 81]  Altered mental status [R41 82]  Dyspnea [R06 00]  Hypoxia [R09 02]  Age/Sex: 80 y o  female  Assessment/Plan:   Generalized weakness   Assessment & Plan     In the patient with baseline ambulatory dysfunction, however, per nursing home patient is usually able to transfer  Will obtain PT/OT consultation  No significant findings on CT scan the brain, will consider MRI imaging tomorrow     Acute encephalopathy   Assessment & Plan     Noted by nursing staff - no known history of dementia and usually alert and oriented x3  On my encounter the patient is oriented x3 so appears at her baseline  It is unclear if the patient was wearing her oxygen that she is supposed to wear 24 hours at the time the mental status change  Will check TSH and monitor electrolytes     Chronic respiratory failure with hypoxia [facility])   Assessment & Plan     Patient was noted to be 87% on room air when picked up by EMS  Patient noted for significant wheezing   Place on respiratory protocol - noted that patient uses DuoNeb p r n  At the nursing home  Unclear reason for patient having been on room air at the time of EMS examination, patient uses 2 L via nasal cannula at all times   PER MD 10/18/18  C/O [address]  LUNGS WITH DECREASED AIR MOVEMENT, O2 @ 2LTR NC   TEMP SPIKE  9 THIS MORNING  U/A SENT SHOWING LEUK WITH NITRITES & BLOOD; AWAIT URINE & BLOOD CULTURE RESULTS, PROCALCITONIN ORDERED  STARTED ON IVABT THERAPY  CONVERTED TO INPATIENT ADMISSION  Admission Orders:  TELEMETRY  PT/OT NATALIA & Rachel Skinner  Scheduled Meds:  Current Facility-Administered Medications:  acetaminophen 650 mg Oral Q6H PRN Saurabh Henderson MD    azithromycin 250 mg Oral Q24H Saurabh Henderson MD    bisacodyl 10 mg Rectal Daily Stefania Javier MD    cefTRIAXone 1,000 mg Intravenous Q24H Saurabh Henderson MD    collagenase  Topical TID Saurabh Henderson MD    ferrous sulfate 325 mg Oral BID With Cleveland Tenorio MD    heparin (porcine) 5,000 Units Subcutaneous Q8H Eladio Coyne MD    ipratropium-albuterol 3 mL Nebulization Q6H PRN Saurabh Henderson MD    levothyroxine 75 mcg Oral Daily Saurabh Henderson MD    magnesium hydroxide 5 mL Oral Daily PRN Saurabh Henderson MD    oxybutynin 5 mg Oral Daily Saurabh Henderson MD    sodium chloride 1 spray Each Nare 4x Daily PRN Saurabh Henderson MD    Continuous Infusions:   PRN Meds:   acetaminophen    ipratropium-albuterol    magnesium hydroxide    sodium chloride  Thank you,  [facility] Utilization Review Department  Phone: [phone];  Fax [phone]  ATTENTION: Please call with any questions or concerns to [phone]  and carefully follow the prompts so that you are directed to the right person  Send all requests for admission clinical reviews, approved or denied determinations and any other requests to fax [phone]   All voicemails are confidential

## 2018-10-18 NOTE — RESPIRATORY THERAPY NOTE
RT Protocol Note  Josué Diop 80 y o  female MRN: [id number]  Unit/Bed#: 424-01 Encounter: [id number]    Assessment    Principal Problem:    Generalized weakness  Active Problems:    Chronic respiratory failure with hypoxia (HCC)    Other specified hypothyroidism    Acute encephalopathy    Presbycusis    Slow transit constipation      Home Pulmonary Medications:  Patient does take prn bronchodialator therapy at the home  She wears 2 lpm oxygen via nasal cannula continuous  Home Devices/Therapy: Home O2    Past Medical History:   Diagnosis Date    Anemia     Arthritis     Cancer ([address] )     breast    Disease of thyroid gland     Hyperlipidemia     IBS (irritable bowel syndrome)     Overactive bladder     Peripheral neuropathy     Polio     Poliomyelitis     Renal disorder     Rhabdomyolysis     UTI (urinary tract infection)     Ventral hernia      Social History     Social History    Marital status: Single     Spouse name: N/A    Number of children: N/A    Years of education: N/A     Social History Main Topics    Smoking status: Never Smoker    Smokeless tobacco: Never Used    Alcohol use No    Drug use: No    Sexual activity: No     Other Topics Concern    None     Social History Narrative    None       Subjective    Subjective Data: Patient states her breathing is terrible    Objective  Continuous oxygen therapy, prn bronchodialator therapy  Physical Exam:   Assessment Type: During-treatment  General Appearance: Alert, Awake  Respiratory Pattern: Normal  Chest Assessment: Chest expansion symmetrical  Bilateral Breath Sounds: Diminished, Inspiratory wheezes  Cough: Dry, Non-productive  O2 Device: 2 lpm via nasal cannula    Vitals:  Blood pressure 115/68, pulse 93, temperature 98 1 °F (36 7 °C), temperature source Temporal, resp  rate 18, height 5' 2" (1 575 m), weight 73 2 kg (161 lb 6 oz), SpO2 95 %  Imaging and other studies: I have personally reviewed pertinent reports        O2 Device: 2 lpm via nasal cannula     Plan    Respiratory Plan: Mild Distress pathway, Home Bronchodilator Patient pathway        Resp Comments: Received patient in bed on oxygen  BS: very decreased, scant wheeze heard  Patient talking to nurses, answering questions, maybe she is a little confused   Breathing isnt labored

## 2018-10-18 NOTE — PLAN OF CARE
DISCHARGE PLANNING     Discharge to home or other facility with appropriate resources Progressing        Knowledge Deficit     Patient/family/caregiver demonstrates understanding of disease process, treatment plan, medications, and discharge instructions Progressing        Potential for Falls     Patient will remain free of falls Progressing        Prexisting or High Potential for Compromised Skin Integrity     Skin integrity is maintained or improved Progressing        SAFETY ADULT     Maintain or return to baseline ADL function Progressing     Maintain or return mobility status to optimal level Progressing

## 2018-10-18 NOTE — PROGRESS NOTES
Progress Note - Josué Diop 1/3/1924, 80 y o  female MRN: [id number]    Unit/Bed#: 424-01 Encounter: [id number]    Primary Care Provider: Michelle Clarke DO   Date and time admitted to hospital: 10/17/2018  2:41 PM        * Generalized weakness   Assessment & Plan    In the patient with baseline ambulatory dysfunction, however, per nursing home patient is usually able to transfer  She currently is drowsy and having difficulty with sitting up in bed  PT/OT consultation pending  MRI pending  Fever   Assessment & Plan    Unknown origin currently  Very minimal leukocytosis of 10,000  Will obtain repeat CXR  Will await results of blood cultures, urinalysis with culture  Will check procalcitonin, lactic acid  Will start IV antibiotics - azithromycin, rocephin  Slow transit constipation   Assessment & Plan    Chronic, continue bowel regimen  Monitor bowel movements  Presbycusis   Assessment & Plan    Chronic  Bilateral, severe, likely age-related       Acute encephalopathy   Assessment & Plan    Noted by nursing staff - no known history of dementia and usually alert and oriented x3  On admission, patient is oriented x3 so appears at her baseline  It is unclear if the patient was wearing her oxygen that she is supposed to wear 24 hours at the time the mental status change, possible mental status change due to hypoxia  CT head is unremarkable  MRI head pending - there are no new focal deficits, old RLE weakness  However, PTA incident was also c/o bilateral upper extremity numbness  Other specified hypothyroidism   Assessment & Plan    Will check TSH  Continue levothyroxine     Chronic respiratory failure with hypoxia [facility])   Assessment & Plan    Patient was noted to be 87% on room air when picked up by EMS  Patient noted for significant wheezing however this is patient's baseline per nursing home staff  No recorded history of asthma or COPD  No concerning findings on chest xray    Place on respiratory protocol - noted that patient uses DuoNeb p r n  At the nursing home  Unclear reason for patient having been on room air at the time of EMS examination, patient uses 2 L via nasal cannula at all times  VTE Pharmacologic Prophylaxis:   Pharmacologic: Heparin  Mechanical VTE Prophylaxis in Place: Yes      Time Spent for Care: 30 minutes  More than 50% of total time spent on counseling and coordination of care as described above  Current Length of Stay: 0 day(s)    Current Patient Status: Inpatient   Certification Statement: The patient will continue to require additional inpatient hospital stay due to patient requires further workup for fever    Discharge Plan / Estimated Discharge Date: TBD      Code Status: Level 3 - DNAR and DNI      Subjective:   Patient feels generally weak with body aches  Notes upper respiratory symptoms such as cough, congestion  Vague historian otherwise  Objective:   Vitals:   Temp (24hrs), Av 1 °F (37 3 °C), Min:98 1 °F (36 7 °C), Max:100 9 °F (38 3 °C)    Temp:  [98 1 °F (36 7 °C)-100 9 °F (38 3 °C)] 99 9 °F (37 7 °C)  HR:  [] 101  Resp:  [15-20] 18  BP: (114-152)/(60-81) 118/64  SpO2:  [92 %-98 %] 92 %  Body mass index is 29 52 kg/m²  Input and Output Summary (last 24 hours): Intake/Output Summary (Last 24 hours) at 10/18/18 1213  Last data filed at 10/18/18 0346   Gross per 24 hour   Intake                0 ml   Output             1000 ml   Net            -1000 ml       Physical Exam:     Physical Exam   Constitutional: She is oriented to person, place, and time  She appears well-developed and well-nourished  HENT:   Head: Normocephalic  Mouth/Throat: Oropharynx is clear and moist    Eyes: Pupils are equal, round, and reactive to light  Neck: No JVD present  Cardiovascular: Normal rate and normal heart sounds  No murmur heard  Pulmonary/Chest: No respiratory distress  Effort is limited  Decreased air movement     Abdominal: Soft  Bowel sounds are normal  She exhibits no distension  Musculoskeletal: She exhibits no edema  Neurological: She is alert and oriented to person, place, and time  Skin: Skin is warm and dry  She is not diaphoretic  Psychiatric: She has a normal mood and affect  Nursing note and vitals reviewed  Additional Data:   Labs:      Results from last 7 days  Lab Units 10/18/18  0509 10/17/18  1552   WBC Thousand/uL 10 26* 7 19   HEMOGLOBIN g/dL 11 3* 11 7   HEMATOCRIT % 36 0 37 8   PLATELETS Thousands/uL 339 313   NEUTROS PCT %  --  63   LYMPHS PCT %  --  24   MONOS PCT %  --  6   EOS PCT %  --  6       Results from last 7 days  Lab Units 10/18/18  0509 10/17/18  1552   SODIUM mmol/L 144 145   POTASSIUM mmol/L 4 8 4 5   CHLORIDE mmol/L 107 107   CO2 mmol/L 32 33*   BUN mg/dL 27* 24   CREATININE mg/dL 1 28 1 30   CALCIUM mg/dL 9 0 8 8   ALK PHOS U/L  --  93   ALT U/L  --  19   AST U/L  --  21           * I Have Reviewed All Lab Data Listed Above  * Additional Pertinent Lab Tests Reviewed: All Labs Within Last 24 Hours Reviewed    Imaging:  Imaging Reports Reviewed Today Include: chest xray, MRI/MRA is pending          Recent Cultures (last 7 days):           Last 24 Hours Medication List:     Current Facility-Administered Medications:  acetaminophen 650 mg Oral Q6H PRN Cecilia Osman MD    [START ON 10/19/2018] azithromycin 250 mg Oral Q24H Stefania Javier MD    bisacodyl 10 mg Rectal Daily Cecilia Osman MD    cefTRIAXone 1,000 mg Intravenous Q24H Cecilia Osman MD Last Rate: 1,000 mg (10/18/18 1058)   collagenase  Topical TID Cecilia Osman MD    ferrous sulfate 325 mg Oral BID With Meals Cecilia Osman MD    heparin (porcine) 5,000 Units Subcutaneous Q8H Cathy Goetz MD    ipratropium-albuterol 3 mL Nebulization Q6H PRN Cecilia Osman MD    levothyroxine 75 mcg Oral Daily Cecilia Osman MD    magnesium hydroxide 5 mL Oral Daily PRN Cecilia Osman MD    oxybutynin 5 mg Oral Daily Cecilia Osman MD sodium chloride 1 spray Each Nare 4x Daily PRN Mairbel Cannon MD         Today, Patient Was Seen By: Jignesh Morocho PA-C    ** Please Note: Dragon 360 Dictation voice to text software may have been used in the creation of this document   **

## 2018-10-18 NOTE — ASSESSMENT & PLAN NOTE
Unknown origin currently  Very minimal leukocytosis of 10,000  Will obtain repeat CXR  Will await results of blood cultures, urinalysis with culture  Will check procalcitonin, lactic acid  Will start IV antibiotics - azithromycin, rocephin

## 2018-10-19 PROBLEM — G93.41 METABOLIC ENCEPHALOPATHY: Status: ACTIVE | Noted: 2018-10-19

## 2018-10-19 LAB — BACTERIA UR CULT: NORMAL

## 2018-10-19 PROCEDURE — 94660 CPAP INITIATION&MGMT: CPT

## 2018-10-19 PROCEDURE — 97167 OT EVAL HIGH COMPLEX 60 MIN: CPT

## 2018-10-19 PROCEDURE — 94760 N-INVAS EAR/PLS OXIMETRY 1: CPT

## 2018-10-19 PROCEDURE — G8998 SWALLOW D/C STATUS: HCPCS | Performed by: SPEECH-LANGUAGE PATHOLOGIST

## 2018-10-19 PROCEDURE — G8988 SELF CARE GOAL STATUS: HCPCS

## 2018-10-19 PROCEDURE — 99232 SBSQ HOSP IP/OBS MODERATE 35: CPT | Performed by: PHYSICIAN ASSISTANT

## 2018-10-19 PROCEDURE — 92610 EVALUATE SWALLOWING FUNCTION: CPT | Performed by: SPEECH-LANGUAGE PATHOLOGIST

## 2018-10-19 PROCEDURE — G8996 SWALLOW CURRENT STATUS: HCPCS | Performed by: SPEECH-LANGUAGE PATHOLOGIST

## 2018-10-19 PROCEDURE — 97163 PT EVAL HIGH COMPLEX 45 MIN: CPT | Performed by: PHYSICAL THERAPIST

## 2018-10-19 PROCEDURE — G8997 SWALLOW GOAL STATUS: HCPCS | Performed by: SPEECH-LANGUAGE PATHOLOGIST

## 2018-10-19 PROCEDURE — G8978 MOBILITY CURRENT STATUS: HCPCS | Performed by: PHYSICAL THERAPIST

## 2018-10-19 PROCEDURE — 97530 THERAPEUTIC ACTIVITIES: CPT

## 2018-10-19 PROCEDURE — G8979 MOBILITY GOAL STATUS: HCPCS | Performed by: PHYSICAL THERAPIST

## 2018-10-19 PROCEDURE — G8987 SELF CARE CURRENT STATUS: HCPCS

## 2018-10-19 PROCEDURE — 97116 GAIT TRAINING THERAPY: CPT | Performed by: PHYSICAL THERAPIST

## 2018-10-19 RX ADMIN — FERROUS SULFATE TAB 325 MG (65 MG ELEMENTAL FE) 325 MG: 325 (65 FE) TAB at 09:18

## 2018-10-19 RX ADMIN — OXYBUTYNIN CHLORIDE 5 MG: 5 TABLET, EXTENDED RELEASE ORAL at 09:18

## 2018-10-19 RX ADMIN — HEPARIN SODIUM 5000 UNITS: 5000 INJECTION, SOLUTION INTRAVENOUS; SUBCUTANEOUS at 05:06

## 2018-10-19 RX ADMIN — LEVOTHYROXINE SODIUM 75 MCG: 75 TABLET ORAL at 09:18

## 2018-10-19 RX ADMIN — HEPARIN SODIUM 5000 UNITS: 5000 INJECTION, SOLUTION INTRAVENOUS; SUBCUTANEOUS at 14:27

## 2018-10-19 RX ADMIN — AZITHROMYCIN 250 MG: 250 TABLET, FILM COATED ORAL at 09:18

## 2018-10-19 RX ADMIN — FERROUS SULFATE TAB 325 MG (65 MG ELEMENTAL FE) 325 MG: 325 (65 FE) TAB at 17:55

## 2018-10-19 RX ADMIN — CEFTRIAXONE 1000 MG: 1 INJECTION, SOLUTION INTRAVENOUS at 09:18

## 2018-10-19 RX ADMIN — HEPARIN SODIUM 5000 UNITS: 5000 INJECTION, SOLUTION INTRAVENOUS; SUBCUTANEOUS at 21:23

## 2018-10-19 NOTE — PHYSICAL THERAPY NOTE
PT Evaluation     10/19/18 0842   Note Type   Note type Eval/Treat   Pain Assessment   Pain Assessment No/denies pain   Pain Score No Pain   Home Living   Type of Home SNF   Additional Comments pt is a resident of [place] [address] [facility]  pt ambulates with RW or uses w/c for (I) mobility   Prior Function   Level of Maxie Modified independent with wheelchair;Needs assistance with ADLs and functional mobility  ((S) with ambulation with RW, mod(I) for w/c mobility)   Lives With Facility staff   Receives Help From Personal care attendant   ADL Assistance Needs assistance   IADLs Needs assistance   Restrictions/Precautions   Other Precautions Bed Alarm; Chair Alarm;Multiple lines;Telemetry;O2;Fall Risk   General   Family/Caregiver Present No   Cognition   Arousal/Participation Alert   Orientation Level Oriented X4   Following Commands Follows all commands and directions without difficulty   RLE Assessment   RLE Assessment WFL  (4-/5)   LLE Assessment   LLE Assessment WFL  (4/5)   Coordination   Sensation WFL   Light Touch   RLE Light Touch Grossly intact   LLE Light Touch Grossly intact   Bed Mobility   Supine to Sit 5  Supervision   Additional items Bedrails;Verbal cues   Sit to Supine (seated in chair at bedside with bell in reach, alarm on)   Additional Comments pt on 2L's O2 with SpO2 at rest 93% HR 88 and after ambulation 93% HR 89  Pt requiring assistance with all transfers due to LE weakness and pt states R leg is shorter than L  Transfers   Sit to Stand 4  Minimal assistance   Additional items Assist x 1;Verbal cues; Bedrails  (with RW)   Stand to Sit 4  Minimal assistance   Additional items Assist x 1;Verbal cues  (with RW)   Stand pivot 4  Minimal assistance   Additional items Assist x 1;Verbal cues  (with RW)   Additional Comments Pt requiring assist with sit to stand and stand pivot transfers with RW however able to ambulate with CGA with chair follow with minimal complaint of SOB but does not fatigue limiting ambulation tolerance requiring seated rest    Ambulation/Elevation   Gait pattern Short stride; Forward Flexion   Gait Assistance (CGA)   Assistive Device Rolling walker   Distance 65ft with RW CGA with limited ambulation tolerance due to fatigue LE weakness and decreased endurance  Balance   Static Sitting Good   Dynamic Sitting Good   Static Standing Fair +  (with RW)   Dynamic Standing Fair  (with RW)   Ambulatory Fair  (with RW)   Endurance Deficit   Endurance Deficit Yes   Endurance Deficit Description limited ambulation and activity tolerance due to fatigue weakness and mild SOB   Activity Tolerance   Activity Tolerance Patient limited by fatigue   Assessment   Prognosis Good   Problem List Decreased strength;Decreased endurance; Impaired balance;Decreased mobility; Decreased safety awareness   Assessment Pt is a 80year old female presenting to [facility] from [address] with confusion weakness and inability to transfers  Pt was placed on stroke pathway to rule out CVA  Pt with complex PMH contributing to current condition  Pt seen for high complexity evaluation and found to have decreased LE strength balance endurance mobility transfers and ambulation requiring 2L's O2 to maintain Spo2 greater than 90% with exertion  Pt is requiring min(A) for all transfers and CGA for ambulation less than 100ft  Pt with SpO2 93% on 2L's at rest and with exertion  Pt requiring chair follow for ambulation 65ft with fatigue and increased fall risk  pt would benefit from continued activity in PT to improve strength balance endurance safety awareness mobility transfers and ambulation  Pt is a long term resident of SNF and will return there on discharge but would benefit from short term skilled PT      Goals   Patient Goals To feel better and return to hometown   [facility] Expiration Date 11/02/18   Long Term Goal #1 Improve LE strength by 1/2 muscle grade to improve bed mobility and transfers to (S) with RW no LOB or drop in spO2   Long Term Goal #2 Improve standing and ambulatory balance to fair+ with RW to improve ambulation to 125ft with RW (S) no LOB   Plan   Treatment/Interventions Functional transfer training;LE strengthening/ROM; Therapeutic exercise; Endurance training;Patient/family training;Bed mobility;Gait training   PT Frequency (3-5x/wk)   Recommendation   Recommendation Short-term skilled PT;Long-term skilled nursing home placement   PT - OK to Discharge Yes   Additional Comments when medically stable for discharge   Pt with SCD's on when PT entered room  SCD's reapplied and turned on with pt seated in chair at bedside with call bell in reach and chair alarm on

## 2018-10-19 NOTE — PLAN OF CARE
Problem: OCCUPATIONAL THERAPY ADULT  Goal: Performs self-care activities at highest level of function for planned discharge setting  See evaluation for individualized goals  Treatment Interventions: ADL retraining, Functional transfer training, UE strengthening/ROM, Endurance training, Patient/family training, Activityengagement          See flowsheet documentation for full assessment, interventions and recommendations  Limitation: Decreased ADL status, Decreased UE strength, Decreased Safe judgement during ADL, Decreased endurance, Decreased self-care trans, Decreased high-level ADLs     Assessment: Pt is a 80 y o  female seen for OT evaluation s/p admit to [facility] on 10/17/2018 w/ Generalized weakness  Comorbidities affecting pt's functional performance at time of assessment include: dementia  Personal factors affecting pt at time of IE include:difficulty performing ADLS, difficulty performing IADLS  and decreased initiation and engagement   Prior to admission, pt was (A) with ADLs/IADLs with use of RW for short distances and w/c mobility  Upon evaluation: Pt requires (S)-min (A) level with use of RW during functional mobility 2* the following deficits impacting occupational performance: weakness, decreased strength, decreased balance, decreased tolerance, impaired initiation and decreased safety awareness  Pt to benefit from continued skilled OT tx while in the hospital to address deficits as defined above and maximize level of functional independence w ADL's and functional mobility  Occupational Performance areas to address include: grooming, bathing/shower, toilet hygiene, dressing, functional mobility, community mobility and clothing management  From OT standpoint, recommendation at time of d/c would be return to SNF with (A) PRN        OT Discharge Recommendation: 24 hour supervision/assist

## 2018-10-19 NOTE — PLAN OF CARE
Problem: PHYSICAL THERAPY ADULT  Goal: Performs mobility at highest level of function for planned discharge setting  See evaluation for individualized goals  Outcome: Progressing  Prognosis: Good  Problem List: Decreased strength, Decreased endurance, Impaired balance, Decreased mobility, Decreased safety awareness  Assessment: Pt is a 80year old female presenting to [facility] from [address] with confusion weakness and inability to transfers  Pt was placed on stroke pathway to rule out CVA  Pt with complex PMH contributing to current condition  Pt seen for high complexity evaluation and found to have decreased LE strength balance endurance mobility transfers and ambulation requiring 2L's O2 to maintain Spo2 greater than 90% with exertion  Pt is requiring min(A) for all transfers and CGA for ambulation less than 100ft  Pt with SpO2 93% on 2L's at rest and with exertion  Pt requiring chair follow for ambulation 65ft with fatigue and increased fall risk  pt would benefit from continued activity in PT to improve strength balance endurance safety awareness mobility transfers and ambulation  Pt is a long term resident of SNF and will return there on discharge but would benefit from short term skilled PT  Recommendation: Short-term skilled PT, Long-term skilled nursing home placement     PT - OK to Discharge: Yes    See flowsheet documentation for full assessment

## 2018-10-19 NOTE — SPEECH THERAPY NOTE
Speech Language/Pathology  Speech/Language Pathology  Assessment    Patient Name: Davy Mendoza  DDSAS'O Date: 10/19/2018     Problem List  Patient Active Problem List   Diagnosis    Chronic respiratory failure with hypoxia (HCC)    Other specified hypothyroidism    Acute encephalopathy    Generalized weakness    Presbycusis    Slow transit constipation    Fever     Past Medical History  Past Medical History:   Diagnosis Date    Anemia     Arthritis     Cancer ([address] )     breast    Disease of thyroid gland     Hyperlipidemia     IBS (irritable bowel syndrome)     Overactive bladder     Peripheral neuropathy     Polio     Poliomyelitis     Renal disorder     Rhabdomyolysis     UTI (urinary tract infection)     Ventral hernia      Past Surgical History  Past Surgical History:   Procedure Laterality Date    APPENDECTOMY      BREAST SURGERY      right mastectomy        10/19/18 1332   Swallow Information   Current Risks for Dysphagia & Aspiration Mental status change   Current Symptoms/Concerns Moist cough  (strained voice quality)   Current Diet Regular; Thin liquid   Baseline Diet Regular; Thin liquids   Baseline Assessment   Behavior/Cognition Alert; Cooperative; Interactive   Speech/Language Status WNL   Patient Positioning Upright in chair   Swallow Mechanism Exam   Labial Symmetry WFL   Labial Strength WFL   Labial ROM WFL   Labial Sensation WFL   Facial Symmetry WFL   Facial Strength WFL   Facial ROM WFL   Facial Sensation WFL   Lingual Symmetry WFL   Lingual Strength WFL   Lingual ROM WFL   Lingual Sensation WFL   Velum WFL   Gag WFL   Mandible WFL   Dentition Dentures top;Dentures bottom   Volitional Cough Strong; Wet   Consistencies Assessed and Performance   Materials Admnistered Regular/Solid; Thin liquid   Materials Adminstered Comment Patient with lunch tray of turkey, gravy, green beans, bread, tea, thin water   Oral Stage WFL   Phargngeal Stage [facility]   Swallow Mechanics Swallow initation; Appears prompt;Good Larygneal rise;Vocal Cord dysfunction suspected   Esophageal Concerns No s/s reported   Summary   Swallow Summary Patient is seen for dysphagia evaluation after wet coughing and strained wet vocal quality  The patient is alert and oriented  Chest xray is negative with clear lungs  MRI brain is negative for acute abnormality  Spoke w/[facility], and patient without h/o of dysphagia or speech  The patient does report h/o rodriguez throughout her life, and she states she is a consistent talker  Vocal quality noted to be strained  She is mouth breather w/stridor during conversation  The patient was without oral or pharyngeal dysphagia at bedside with lunch tray  She does report "blocked ears" all the time and increased mucus dripping in her throat  Patient is [facility] for regular diet level and thin liquids  Would consider referral to ENT for further assessment  Recommendations   Recommendations Consider oral diet   Diet Solid Recommendation Regular consistency   Diet Liquid Recommendation Thin liquid   Recommended Form of Meds As desired; As tolerated   General Precautions Feed only when alert;Upright as possible for all oral intake   Further Evaluations ENT   Results Reviewed with PAC/CRNP;RN;PT/Family/Caregiver

## 2018-10-19 NOTE — ASSESSMENT & PLAN NOTE
Unknown origin currently  Very minimal leukocytosis of 10,000  repeat CXR negative  blood cultures negative  procalcitonin, lactic acid both negative  Continue IV antibiotics for now as the patient seems to be clinicially improving  -  Rocephin only as suspect UTI  Karrie Nissen

## 2018-10-19 NOTE — OCCUPATIONAL THERAPY NOTE
Occupational Therapy Evaluation     Patient Name: Lisbet Solano  IGTNT'V Date: 10/19/2018  Problem List  Patient Active Problem List   Diagnosis    Chronic respiratory failure with hypoxia (HCC)    Other specified hypothyroidism    Acute encephalopathy    Generalized weakness    Presbycusis    Slow transit constipation    Fever     Past Medical History  Past Medical History:   Diagnosis Date    Anemia     Arthritis     Cancer ([address] )     breast    Disease of thyroid gland     Hyperlipidemia     IBS (irritable bowel syndrome)     Overactive bladder     Peripheral neuropathy     Polio     Poliomyelitis     Renal disorder     Rhabdomyolysis     UTI (urinary tract infection)     Ventral hernia      Past Surgical History  Past Surgical History:   Procedure Laterality Date    APPENDECTOMY      BREAST SURGERY      right mastectomy           10/19/18 0816   Note Type   Note type Eval/Treat   Restrictions/Precautions   Weight Bearing Precautions Per Order No   Other Precautions Chair Alarm; Bed Alarm;Multiple lines;Telemetry; Fall Risk   Pain Assessment   Pain Assessment No/denies pain   Home Living   Type of Home SNF   Additional Comments pt resides at [facility]    Prior Function   Level of [place] Needs assistance with ADLs and functional mobility; Needs assistance with IADLs   Lives With Facility staff   Receives Help From Personal care attendant   ADL Assistance Needs assistance   IADLs Needs assistance   Falls in the last 6 months 0   Comments pt utilizes w/c and RW during functional mobility   Psychosocial   Psychosocial (WDL) WDL   Subjective   Subjective "I have to get my see legs going here"   Bed Mobility   Supine to Sit 5  Supervision   Additional items Bedrails;Verbal cues   Sit to Supine (seated in chair at end of session)   Additional Comments pt on 2L O2 throughout session; SpO2 was 93% at start and end of session   Transfers   Sit to Stand 4  Minimal assistance Additional items Verbal cues; Assist x 1  (RW)   Stand to Sit 4  Minimal assistance   Additional items Verbal cues; Assist x 1  (RW)   Functional Mobility   Functional Mobility 5  Supervision   Additional Comments pt performed functional mobility with use of RW during session in room and hallway; pt performed with no LOB   Additional items Rolling walker   Balance   Static Sitting Good   Dynamic Sitting Good   Static Standing Fair +   Dynamic Standing Fair +   Ambulatory Fair +   Activity Tolerance   Activity Tolerance Patient limited by fatigue   RUE Assessment   RUE Assessment WFL   LUE Assessment   LUE Assessment WFL   Hand Function   Gross Motor Coordination Functional   Fine Motor Coordination Functional   Sensation   Light Touch No apparent deficits   Sharp/Dull No apparent deficits   Cognition   Overall Cognitive Status WFL   Arousal/Participation Alert   Attention Within functional limits   Orientation Level Oriented X4   Memory Within functional limits   Following Commands Follows all commands and directions without difficulty   Assessment   Limitation Decreased ADL status; Decreased UE strength;Decreased Safe judgement during ADL;Decreased endurance;Decreased self-care trans;Decreased high-level ADLs   Assessment Pt is a 80 y o  female seen for OT evaluation s/p admit to [facility] on 10/17/2018 w/ Generalized weakness  Comorbidities affecting pt's functional performance at time of assessment include: dementia  Personal factors affecting pt at time of IE include:difficulty performing ADLS, difficulty performing IADLS  and decreased initiation and engagement   Prior to admission, pt was (A) with ADLs/IADLs with use of RW for short distances and w/c mobility   Upon evaluation: Pt requires (S)-min (A) level with use of RW during functional mobility 2* the following deficits impacting occupational performance: weakness, decreased strength, decreased balance, decreased tolerance, impaired initiation and decreased safety awareness  Pt to benefit from continued skilled OT tx while in the hospital to address deficits as defined above and maximize level of functional independence w ADL's and functional mobility  Occupational Performance areas to address include: grooming, bathing/shower, toilet hygiene, dressing, functional mobility, community mobility and clothing management  From OT standpoint, recommendation at time of d/c would be return to SNF with (A) PRN  Goals   Patient Goals to go home    Short Term Goal  pt will increase independence with functional mobility with use of RW to mod (I) level    Long Term Goal #1 pt will demonstrate toilet transfers and toilet hygiene at (I) level    Long Term Goal #2 pt will increase independence with LB dressing to (S) level    Long Term Goal pt will demonstrate UB/LB bathing and grooming tasks while seated EOB or in chair with decreased cues   Plan   Treatment Interventions ADL retraining;Functional transfer training;UE strengthening/ROM; Endurance training;Patient/family training; Activityengagement   Goal Expiration Date 11/02/18   OT Frequency 3-5x/wk   Recommendation   OT Discharge Recommendation 24 hour supervision/assist   Barthel Index   Feeding 5   Bathing 0   Grooming Score 0   Dressing Score 5   Bladder Score 10   Bowels Score 10   Toilet Use Score 5   Transfers (Bed/Chair) Score 10   Mobility (Level Surface) Score 10   Stairs Score 0   Barthel Index Score 55     Pt will benefit from continued OT services in order to maximize (I) c ADL performance, FM c RW, and improve overall endurance/strength required to complete functional tasks in preparation for d/c  Pt left seated in chair at end of session; all needs within reach; all lines intact; scds connected and turned on

## 2018-10-19 NOTE — RESPIRATORY THERAPY NOTE
RT Protocol Note  Maye Horn 80 y o  female MRN: [id number]  Unit/Bed#: 424-01 Encounter: [id number]    Assessment    Principal Problem:    Generalized weakness  Active Problems:    Chronic respiratory failure with hypoxia (HCC)    Other specified hypothyroidism    Acute encephalopathy    Presbycusis    Slow transit constipation    Fever      Home Pulmonary Medications:  None  Home Devices/Therapy: Home O2    Past Medical History:   Diagnosis Date    Anemia     Arthritis     Cancer (HCC)     breast    Disease of thyroid gland     Hyperlipidemia     IBS (irritable bowel syndrome)     Overactive bladder     Peripheral neuropathy     Polio     Poliomyelitis     Renal disorder     Rhabdomyolysis     UTI (urinary tract infection)     Ventral hernia      Social History     Social History    Marital status: Single     Spouse name: N/A    Number of children: N/A    Years of education: N/A     Social History Main Topics    Smoking status: Never Smoker    Smokeless tobacco: Never Used    Alcohol use No    Drug use: No    Sexual activity: No     Other Topics Concern    None     Social History Narrative    None       Subjective    Subjective Data: Patient states her breathing is terrible    Objective    Physical Exam:   Assessment Type: Assess only  General Appearance: Alert, Awake  Respiratory Pattern: Normal  Chest Assessment: Chest expansion symmetrical  Bilateral Breath Sounds: Diminished  Cough: None  O2 Device: [email or web address]    Vitals:  Blood pressure 116/60, pulse 96, temperature 97 8 °F (36 6 °C), temperature source Temporal, resp  rate 16, height 5' 2" (1 575 m), weight 73 2 kg (161 lb 6 oz), SpO2 96 %  Imaging and other studies: I have personally reviewed pertinent reports  O2 Device: [email or web address]     Plan    Respiratory Plan: No distress/Pulmonary history        Resp Comments: Received patient in bed on oxygen  BS: very decreased, scant wheeze heard   Patient talking to nurses, answering questions, maybe she is a little confused   Breathing isnt labored

## 2018-10-19 NOTE — PROGRESS NOTES
Progress Note - Biju Braxton 1/3/1924, 80 y o  female MRN: [id number]    Unit/Bed#: 424-01 Encounter: [id number]    Primary Care Provider: Eric Erickson DO   Date and time admitted to hospital: 10/17/2018  2:41 PM        * Generalized weakness   Assessment & Plan    Possibly secondary to UTI  She currently is drowsy and having difficulty with sitting up in bed  PT/OT  MRI shows no acute changes  Fever   Assessment & Plan    Unknown origin currently  Very minimal leukocytosis of 10,000  repeat CXR negative  blood cultures negative  procalcitonin, lactic acid both negative  Continue IV antibiotics for now as the patient seems to be clinicially improving  -  Rocephin only as suspect UTI  Shelvy Mingle Metabolic encephalopathy   Assessment & Plan    Possibly in the setting of transient hypoxia,  Now resolved  Slow transit constipation   Assessment & Plan    Chronic, continue bowel regimen  Monitor bowel movements  Presbycusis   Assessment & Plan    Chronic  Bilateral, severe, likely age-related       Acute encephalopathy   Assessment & Plan    Noted by nursing staff - no known history of dementia and usually alert and oriented x3  On admission, patient is oriented x3 so appears at her baseline  It is unclear if the patient was wearing her oxygen that she is supposed to wear 24 hours at the time the mental status change, possible mental status change due to hypoxia  CT head is unremarkable  MRI head pending - there are no new focal deficits, old RLE weakness  However, PTA incident was also c/o bilateral upper extremity numbness  Other specified hypothyroidism   Assessment & Plan    Will check TSH  Continue levothyroxine     Chronic respiratory failure with hypoxia [facility])   Assessment & Plan    Patient was noted to be 87% on room air when picked up by EMS  Patient noted for significant wheezing however this is patient's baseline per nursing home staff    No recorded history of asthma or COPD   No concerning findings on chest xray  Place on respiratory protocol - noted that patient uses DuoNeb p r n  At the nursing home  Unclear reason for patient having been on room air at the time of EMS examination, patient uses 2 L via nasal cannula at all times  VTE Pharmacologic Prophylaxis:   Pharmacologic: Heparin  Mechanical VTE Prophylaxis in Place: Yes        Time Spent for Care: 30 minutes  More than 50% of total time spent on counseling and coordination of care as described above  Current Length of Stay: 1 day(s)    Current Patient Status: Inpatient   Certification Statement: The patient will continue to require additional inpatient hospital stay due to need for close monitoring, IV antibiotics  Discharge Plan / Estimated Discharge Date: TBD      Code Status: Level 3 - DNAR and DNI      Subjective:       Objective:   Vitals:   Temp (24hrs), Av 2 °F (36 8 °C), Min:97 9 °F (36 6 °C), Max:98 5 °F (36 9 °C)    Temp:  [97 9 °F (36 6 °C)-98 5 °F (36 9 °C)] 97 9 °F (36 6 °C)  HR:  [69-97] 69  Resp:  [16-20] 20  BP: (107-133)/(57-63) 133/63  SpO2:  [93 %-98 %] 98 %  Body mass index is 29 52 kg/m²  Input and Output Summary (last 24 hours): Intake/Output Summary (Last 24 hours) at 10/19/18 1636  Last data filed at 10/19/18 1510   Gross per 24 hour   Intake              180 ml   Output             1300 ml   Net            -1120 ml       Physical Exam:   Physical Exam   Constitutional: She is oriented to person, place, and time  She appears well-developed and well-nourished  HENT:   Head: Atraumatic  Mouth/Throat: Oropharynx is clear and moist    Neck: No JVD present  Cardiovascular: Normal rate and regular rhythm  No murmur heard  Pulmonary/Chest: Effort normal and breath sounds normal  No respiratory distress  She has no wheezes  Abdominal: Soft  Bowel sounds are normal  She exhibits no distension  There is no tenderness  Musculoskeletal: She exhibits no edema  Neurological: She is alert and oriented to person, place, and time  Skin: Skin is warm and dry  She is not diaphoretic  Psychiatric: She has a normal mood and affect  Nursing note and vitals reviewed  Additional Data:   Labs:    Results from last 7 days  Lab Units 10/18/18  0509 10/17/18  1552   WBC Thousand/uL 10 26* 7 19   HEMOGLOBIN g/dL 11 3* 11 7   HEMATOCRIT % 36 0 37 8   PLATELETS Thousands/uL 339 313   NEUTROS PCT %  --  63   LYMPHS PCT %  --  24   MONOS PCT %  --  6   EOS PCT %  --  6       Results from last 7 days  Lab Units 10/18/18  0509 10/17/18  1552   SODIUM mmol/L 144 145   POTASSIUM mmol/L 4 8 4 5   CHLORIDE mmol/L 107 107   CO2 mmol/L 32 33*   BUN mg/dL 27* 24   CREATININE mg/dL 1 28 1 30   CALCIUM mg/dL 9 0 8 8   ALK PHOS U/L  --  93   ALT U/L  --  19   AST U/L  --  21           * I Have Reviewed All Lab Data Listed Above  * Additional Pertinent Lab Tests Reviewed: All Labs Within Last 24 Hours Reviewed      Imaging:  Imaging Reports Reviewed Today Include: no new imaging for review  Recent Cultures (last 7 days):     Results from last 7 days  Lab Units 10/18/18  0905 10/18/18  0847   BLOOD CULTURE  No Growth at 24 hrs    No Growth at 24 hrs   --    INFLUENZA A PCR   --  None Detected   INFLUENZA B PCR   --  None Detected   RSV PCR   --  None Detected       Last 24 Hours Medication List:     Current Facility-Administered Medications:  acetaminophen 650 mg Oral Q6H PRN Vika Murphy MD    azithromycin 250 mg Oral Q24H Vika Murphy MD    bisacodyl 10 mg Rectal Daily Vika Murphy MD    cefTRIAXone 1,000 mg Intravenous Q24H Viak Murphy MD Last Rate: 1,000 mg (10/19/18 0918)   collagenase  Topical TID Vika Murphy MD    ferrous sulfate 325 mg Oral BID With Meals Vika Murphy MD    heparin (porcine) 5,000 Units Subcutaneous Q8H Deacon Moura MD    ipratropium-albuterol 3 mL Nebulization Q6H PRN Vika Murphy MD    levothyroxine 75 mcg Oral Daily Vika Murphy MD magnesium hydroxide 5 mL Oral Daily PRN Levi Bethea MD    oxybutynin 5 mg Oral Daily Levi Bethea MD    sodium chloride 1 spray Each Nare 4x Daily PRN Levi Bethea MD         Today, Patient Was Seen By: Silvana Licona PA-C    ** Please Note: Dragon 360 Dictation voice to text software may have been used in the creation of this document   **

## 2018-10-19 NOTE — ASSESSMENT & PLAN NOTE
Possibly secondary to UTI  She currently is drowsy and having difficulty with sitting up in bed  PT/OT  MRI shows no acute changes

## 2018-10-19 NOTE — SOCIAL WORK
Clinical faxed over to [facility] [phone] to try and authorize skilled days back at [facility] as pt is a probable dc back to SNF tomorrow  blood type/VDRL/RPR/group B strep/rubella/HBsAG/HIV

## 2018-10-20 ENCOUNTER — APPOINTMENT (INPATIENT)
Dept: CT IMAGING | Facility: HOSPITAL | Age: 83
DRG: 070 | End: 2018-10-20
Payer: COMMERCIAL

## 2018-10-20 ENCOUNTER — APPOINTMENT (INPATIENT)
Dept: RADIOLOGY | Facility: HOSPITAL | Age: 83
DRG: 070 | End: 2018-10-20
Payer: COMMERCIAL

## 2018-10-20 PROBLEM — R53.83 LETHARGY: Status: ACTIVE | Noted: 2018-10-20

## 2018-10-20 PROBLEM — R53.83 LETHARGY: Status: RESOLVED | Noted: 2018-10-20 | Resolved: 2018-10-20

## 2018-10-20 PROBLEM — N30.00 ACUTE CYSTITIS WITHOUT HEMATURIA: Status: ACTIVE | Noted: 2018-10-18

## 2018-10-20 LAB
ANION GAP SERPL CALCULATED.3IONS-SCNC: 4 MMOL/L (ref 4–13)
ARTERIAL PATENCY WRIST A: YES
ARTERIAL PATENCY WRIST A: YES
BASE EXCESS BLDA CALC-SCNC: 0.4 MMOL/L
BASE EXCESS BLDA CALC-SCNC: 4.2 MMOL/L
BASOPHILS # BLD AUTO: 0.04 THOUSANDS/ΜL (ref 0–0.1)
BASOPHILS NFR BLD AUTO: 1 % (ref 0–1)
BUN SERPL-MCNC: 28 MG/DL (ref 5–25)
CALCIUM SERPL-MCNC: 8.4 MG/DL (ref 8.3–10.1)
CHLORIDE SERPL-SCNC: 109 MMOL/L (ref 100–108)
CO2 SERPL-SCNC: 31 MMOL/L (ref 21–32)
CREAT SERPL-MCNC: 1.31 MG/DL (ref 0.6–1.3)
EOSINOPHIL # BLD AUTO: 0.51 THOUSAND/ΜL (ref 0–0.61)
EOSINOPHIL NFR BLD AUTO: 7 % (ref 0–6)
ERYTHROCYTE [DISTWIDTH] IN BLOOD BY AUTOMATED COUNT: 13.8 % (ref 11.6–15.1)
GFR SERPL CREATININE-BSD FRML MDRD: 35 ML/MIN/1.73SQ M
GLUCOSE SERPL-MCNC: 74 MG/DL (ref 65–140)
HCO3 BLDA-SCNC: 26.5 MMOL/L (ref 22–28)
HCO3 BLDA-SCNC: 30.2 MMOL/L (ref 22–28)
HCT VFR BLD AUTO: 34.7 % (ref 34.8–46.1)
HFNC FLOW LPM: 35
HGB BLD-MCNC: 10.5 G/DL (ref 11.5–15.4)
IMM GRANULOCYTES # BLD AUTO: 0.04 THOUSAND/UL (ref 0–0.2)
IMM GRANULOCYTES NFR BLD AUTO: 1 % (ref 0–2)
LACTATE SERPL-SCNC: 0.5 MMOL/L (ref 0.5–2)
LYMPHOCYTES # BLD AUTO: 1.99 THOUSANDS/ΜL (ref 0.6–4.47)
LYMPHOCYTES NFR BLD AUTO: 28 % (ref 14–44)
MCH RBC QN AUTO: 30.9 PG (ref 26.8–34.3)
MCHC RBC AUTO-ENTMCNC: 30.3 G/DL (ref 31.4–37.4)
MCV RBC AUTO: 102 FL (ref 82–98)
MONOCYTES # BLD AUTO: 0.51 THOUSAND/ΜL (ref 0.17–1.22)
MONOCYTES NFR BLD AUTO: 7 % (ref 4–12)
NASAL CANNULA: 2
NEUTROPHILS # BLD AUTO: 3.92 THOUSANDS/ΜL (ref 1.85–7.62)
NEUTS SEG NFR BLD AUTO: 56 % (ref 43–75)
NON VENT HFNC FIO2: 30
NON VENT TYPE HFNC: ABNORMAL
NRBC BLD AUTO-RTO: 0 /100 WBCS
O2 CT BLDA-SCNC: 15.5 ML/DL (ref 16–23)
O2 CT BLDA-SCNC: 15.8 ML/DL (ref 16–23)
OXYHGB MFR BLDA: 95.5 % (ref 94–97)
OXYHGB MFR BLDA: 96.1 % (ref 94–97)
PCO2 BLDA: 49.2 MM HG (ref 36–44)
PCO2 BLDA: 51.7 MM HG (ref 36–44)
PH BLDA: 7.35 [PH] (ref 7.35–7.45)
PH BLDA: 7.38 [PH] (ref 7.35–7.45)
PLATELET # BLD AUTO: 275 THOUSANDS/UL (ref 149–390)
PMV BLD AUTO: 9.9 FL (ref 8.9–12.7)
PO2 BLDA: 87.1 MM HG (ref 75–129)
PO2 BLDA: 89.9 MM HG (ref 75–129)
POTASSIUM SERPL-SCNC: 4.5 MMOL/L (ref 3.5–5.3)
PROCALCITONIN SERPL-MCNC: 0.1 NG/ML
RBC # BLD AUTO: 3.4 MILLION/UL (ref 3.81–5.12)
SODIUM SERPL-SCNC: 144 MMOL/L (ref 136–145)
SPECIMEN SOURCE: ABNORMAL
SPECIMEN SOURCE: ABNORMAL
TSH SERPL DL<=0.05 MIU/L-ACNC: 4.55 UIU/ML (ref 0.36–3.74)
WBC # BLD AUTO: 7.01 THOUSAND/UL (ref 4.31–10.16)

## 2018-10-20 PROCEDURE — 84145 PROCALCITONIN (PCT): CPT | Performed by: PHYSICIAN ASSISTANT

## 2018-10-20 PROCEDURE — 84443 ASSAY THYROID STIM HORMONE: CPT | Performed by: PHYSICIAN ASSISTANT

## 2018-10-20 PROCEDURE — 94760 N-INVAS EAR/PLS OXIMETRY 1: CPT

## 2018-10-20 PROCEDURE — 94660 CPAP INITIATION&MGMT: CPT

## 2018-10-20 PROCEDURE — 80048 BASIC METABOLIC PNL TOTAL CA: CPT | Performed by: PHYSICIAN ASSISTANT

## 2018-10-20 PROCEDURE — 71250 CT THORAX DX C-: CPT

## 2018-10-20 PROCEDURE — 99232 SBSQ HOSP IP/OBS MODERATE 35: CPT | Performed by: PHYSICIAN ASSISTANT

## 2018-10-20 PROCEDURE — 85025 COMPLETE CBC W/AUTO DIFF WBC: CPT | Performed by: PHYSICIAN ASSISTANT

## 2018-10-20 PROCEDURE — 36600 WITHDRAWAL OF ARTERIAL BLOOD: CPT

## 2018-10-20 PROCEDURE — 71045 X-RAY EXAM CHEST 1 VIEW: CPT

## 2018-10-20 PROCEDURE — 82805 BLOOD GASES W/O2 SATURATION: CPT | Performed by: PHYSICIAN ASSISTANT

## 2018-10-20 PROCEDURE — 94762 N-INVAS EAR/PLS OXIMTRY CONT: CPT

## 2018-10-20 PROCEDURE — 83605 ASSAY OF LACTIC ACID: CPT | Performed by: PHYSICIAN ASSISTANT

## 2018-10-20 PROCEDURE — 94664 DEMO&/EVAL PT USE INHALER: CPT

## 2018-10-20 RX ORDER — CEFUROXIME AXETIL 250 MG/1
250 TABLET ORAL EVERY 12 HOURS SCHEDULED
Qty: 10 TABLET | Refills: 0
Start: 2018-10-20 | End: 2018-10-22 | Stop reason: HOSPADM

## 2018-10-20 RX ORDER — SODIUM CHLORIDE 9 MG/ML
125 INJECTION, SOLUTION INTRAVENOUS CONTINUOUS
Status: DISCONTINUED | OUTPATIENT
Start: 2018-10-20 | End: 2018-10-20

## 2018-10-20 RX ORDER — FUROSEMIDE 10 MG/ML
20 INJECTION INTRAMUSCULAR; INTRAVENOUS ONCE
Status: COMPLETED | OUTPATIENT
Start: 2018-10-20 | End: 2018-10-20

## 2018-10-20 RX ORDER — SODIUM CHLORIDE 9 MG/ML
75 INJECTION, SOLUTION INTRAVENOUS CONTINUOUS
Status: DISCONTINUED | OUTPATIENT
Start: 2018-10-20 | End: 2018-10-20

## 2018-10-20 RX ADMIN — OXYBUTYNIN CHLORIDE 5 MG: 5 TABLET, EXTENDED RELEASE ORAL at 09:20

## 2018-10-20 RX ADMIN — FERROUS SULFATE TAB 325 MG (65 MG ELEMENTAL FE) 325 MG: 325 (65 FE) TAB at 16:35

## 2018-10-20 RX ADMIN — FUROSEMIDE 20 MG: 10 INJECTION, SOLUTION INTRAVENOUS at 16:35

## 2018-10-20 RX ADMIN — HEPARIN SODIUM 5000 UNITS: 5000 INJECTION, SOLUTION INTRAVENOUS; SUBCUTANEOUS at 13:54

## 2018-10-20 RX ADMIN — HEPARIN SODIUM 5000 UNITS: 5000 INJECTION, SOLUTION INTRAVENOUS; SUBCUTANEOUS at 21:34

## 2018-10-20 RX ADMIN — LEVOTHYROXINE SODIUM 75 MCG: 75 TABLET ORAL at 09:20

## 2018-10-20 RX ADMIN — IPRATROPIUM BROMIDE AND ALBUTEROL SULFATE 3 ML: .5; 3 SOLUTION RESPIRATORY (INHALATION) at 12:56

## 2018-10-20 RX ADMIN — SODIUM CHLORIDE 125 ML/HR: 0.9 INJECTION, SOLUTION INTRAVENOUS at 12:11

## 2018-10-20 RX ADMIN — HEPARIN SODIUM 5000 UNITS: 5000 INJECTION, SOLUTION INTRAVENOUS; SUBCUTANEOUS at 05:32

## 2018-10-20 RX ADMIN — CEFTRIAXONE 1000 MG: 1 INJECTION, SOLUTION INTRAVENOUS at 10:41

## 2018-10-20 RX ADMIN — FERROUS SULFATE TAB 325 MG (65 MG ELEMENTAL FE) 325 MG: 325 (65 FE) TAB at 08:01

## 2018-10-20 NOTE — ASSESSMENT & PLAN NOTE
Possibly in the setting of transient hypoxia  Initially resolved but now lethargic  Will obtain STAT ABG, lactic acid, procalcitonin  Will need STAT CTA chest to r/o PE  Continuous pulse oximetry monitoring

## 2018-10-20 NOTE — ASSESSMENT & PLAN NOTE
Patient was noted to be 87% on room air when picked up by EMS  Patient noted for significant wheezing however this is patient's baseline per nursing home staff  No recorded history of asthma or COPD  No concerning findings on chest xray  Place on respiratory protocol - noted that patient uses DuoNeb p r n  At the nursing home  Unclear reason for patient having been on room air at the time of EMS examination, patient uses 2 L via nasal cannula at all times    Now with lethargy although pulse oximetry is normal    Will obtain STAT CT chest

## 2018-10-20 NOTE — ASSESSMENT & PLAN NOTE
Possibly secondary to UTI  She was  drowsy and having difficulty with sitting up in bed initially but this seems to be improving  PT/OT  MRI shows no acute changes

## 2018-10-20 NOTE — PROGRESS NOTES
Pt difficult to arouse  O2 sat 97% on 2liters of O2  Vital signs stable  HOB sl  Elevated  Pt states she is tired and wants to sleep  [facility] PAC  Made aware of same  New orders received

## 2018-10-20 NOTE — PROGRESS NOTES
Discussed abnormal CT chest findings with radiology  Pulmonary edema noted, so will stop IV fluids and give 1 time dose of IV lasix  Patient also has incidental findings noted including possible recurrent breast cancer  Will need to discuss with patient tomorrow

## 2018-10-20 NOTE — RESPIRATORY THERAPY NOTE
VapoTherm:  Pt  Set up on HFNC  Settings: Flow 35 l/m    Fi02 30%  Sat 95%   Pt   Comfortable at this time

## 2018-10-20 NOTE — PROGRESS NOTES
Progress Note - Kobe Chau 1/3/1924, 80 y o  female MRN: [id number]    Unit/Bed#: 424-01 Encounter: [id number]    Primary Care Provider: Annabel Klein DO   Date and time admitted to hospital: 10/17/2018  2:41 PM        Metabolic encephalopathy   Assessment & Plan    Possibly in the setting of transient hypoxia  Initially resolved but now lethargic  Will obtain STAT ABG, lactic acid, procalcitonin  Will need STAT CTA chest to r/o PE  Continuous pulse oximetry monitoring  * Generalized weakness   Assessment & Plan    Possibly secondary to UTI  She was  drowsy and having difficulty with sitting up in bed initially but this seems to be improving  PT/OT  MRI shows no acute changes  Acute cystitis without hematuria   Assessment & Plan    Probable UTI with leukocytosis and fever present early on in hospital course  repeat CXR negative  blood cultures negative  procalcitonin, lactic acid both negative  Continue IV antibiotics for now as the patient seems to be clinicially improving  -  Rocephin only as suspect UTI  Deb Crenshaw Slow transit constipation   Assessment & Plan    Chronic, continue bowel regimen  Monitor bowel movements  Presbycusis   Assessment & Plan    Chronic  Bilateral, severe, likely age-related       Acute encephalopathy   Assessment & Plan    Noted by nursing staff - no known history of dementia and usually alert and oriented x3  On admission, patient is oriented x3 so appears at her baseline  It is unclear if the patient was wearing her oxygen that she is supposed to wear 24 hours at the time the mental status change, possible mental status change due to hypoxia  CT head is unremarkable  MRI head  - showing old ischemic disease  Other specified hypothyroidism   Assessment & Plan    Will check TSH  Continue levothyroxine     Chronic respiratory failure with hypoxia [facility])   Assessment & Plan    Patient was noted to be 87% on room air when picked up by EMS    Patient noted for significant wheezing however this is patient's baseline per nursing home staff  No recorded history of asthma or COPD  No concerning findings on chest xray  Place on respiratory protocol - noted that patient uses DuoNeb p r n  At the nursing home  Unclear reason for patient having been on room air at the time of EMS examination, patient uses 2 L via nasal cannula at all times  Now with lethargy although pulse oximetry is normal    Will obtain STAT CT chest              VTE Pharmacologic Prophylaxis:   Pharmacologic: Heparin  Mechanical VTE Prophylaxis in Place: Yes        Education and Discussions with Family / Patient: left message for  to call back  Time Spent for Care: 30 minutes  More than 50% of total time spent on counseling and coordination of care as described above  Current Length of Stay: 2 day(s)    Current Patient Status: Inpatient   Certification Statement: The patient will continue to require additional inpatient hospital stay due to need for further workup of lethargy - labs, CTA, close monitoring  Discharge Plan / Estimated Discharge Date: TBD      Code Status: Level 3 - DNAR and DNI      Subjective:   Patient is lethargic today  Difficulty staying awake  Notes she did not sleep well last night  Denies chest pain  Notes some shortness of breath  Objective:   Vitals:   Temp (24hrs), Av 4 °F (36 9 °C), Min:97 9 °F (36 6 °C), Max:98 9 °F (37 2 °C)    Temp:  [97 9 °F (36 6 °C)-98 9 °F (37 2 °C)] 98 5 °F (36 9 °C)  HR:  [69-85] 80  Resp:  [19-20] 20  BP: (115-137)/(57-66) 125/66  SpO2:  [93 %-98 %] 97 %  Body mass index is 29 52 kg/m²  Input and Output Summary (last 24 hours): Intake/Output Summary (Last 24 hours) at 10/20/18 1229  Last data filed at 10/20/18 0900   Gross per 24 hour   Intake              420 ml   Output             1000 ml   Net             -580 ml       Physical Exam:     Physical Exam   Constitutional: She appears well-developed  She appears lethargic  No distress  HENT:   Mouth/Throat: Oropharynx is clear and moist    Neck: No JVD present  Cardiovascular: Normal rate  No murmur heard  Pulmonary/Chest: Effort normal and breath sounds normal  She has no wheezes  Upper airway congestion / stridor present  Abdominal: She exhibits no distension  Ventral hernia present, partially reducible, no pain  Neurological: She appears lethargic  Skin: Skin is warm and dry  She is not diaphoretic  Nursing note and vitals reviewed  Additional Data:   Labs:      Results from last 7 days  Lab Units 10/20/18  0524   WBC Thousand/uL 7 01   HEMOGLOBIN g/dL 10 5*   HEMATOCRIT % 34 7*   PLATELETS Thousands/uL 275   NEUTROS PCT % 56   LYMPHS PCT % 28   MONOS PCT % 7   EOS PCT % 7*       Results from last 7 days  Lab Units 10/20/18  0524  10/17/18  1552   SODIUM mmol/L 144  < > 145   POTASSIUM mmol/L 4 5  < > 4 5   CHLORIDE mmol/L 109*  < > 107   CO2 mmol/L 31  < > 33*   BUN mg/dL 28*  < > 24   CREATININE mg/dL 1 31*  < > 1 30   CALCIUM mg/dL 8 4  < > 8 8   ALK PHOS U/L  --   --  93   ALT U/L  --   --  19   AST U/L  --   --  21   < > = values in this interval not displayed  * I Have Reviewed All Lab Data Listed Above  * Additional Pertinent Lab Tests Reviewed: All Labs Within Last 24 Hours Reviewed      Imaging:  Imaging Reports Reviewed Today Include: no new imaging for review  Recent Cultures (last 7 days):     Results from last 7 days  Lab Units 10/18/18  1300 10/18/18  0905 10/18/18  0847   BLOOD CULTURE   --  No Growth at 24 hrs    No Growth at 24 hrs   --    URINE CULTURE  50,000-59,000 cfu/ml   --   --    INFLUENZA A PCR   --   --  None Detected   INFLUENZA B PCR   --   --  None Detected   RSV PCR   --   --  None Detected       Last 24 Hours Medication List:     Current Facility-Administered Medications:  acetaminophen 650 mg Oral Q6H PRN Sav Escobar MD    bisacodyl 10 mg Rectal Daily Sav Escobar MD    cefTRIAXone 1,000 mg Intravenous Q24H Sav Escobar MD Last Rate: 1,000 mg (10/20/18 1041)   collagenase  Topical TID Sav Escobar MD    ferrous sulfate 325 mg Oral BID With Meals Sav Escobar MD    heparin (porcine) 5,000 Units Subcutaneous Q8H Ashu Wynne MD    ipratropium-albuterol 3 mL Nebulization Q6H PRN Sav Escobar MD    levothyroxine 75 mcg Oral Daily Sav Escobar MD    magnesium hydroxide 5 mL Oral Daily PRN Sav Escobar MD    oxybutynin 5 mg Oral Daily Stefania Javier MD    sodium chloride 1 spray Each Nare 4x Daily PRN Sav Escobar MD    sodium chloride 75 mL/hr Intravenous Continuous Reynaldo Quach PA-C Last Rate: 75 mL/hr (10/20/18 1223)        Today, Patient Was Seen By: Reynaldo Quach PA-C    ** Please Note: Dragon 360 Dictation voice to text software may have been used in the creation of this document   **

## 2018-10-20 NOTE — ASSESSMENT & PLAN NOTE
Probable UTI with leukocytosis and fever present early on in hospital course  repeat CXR negative  blood cultures negative  procalcitonin, lactic acid both negative  Continue IV antibiotics for now as the patient seems to be clinicially improving  -  Rocephin only as suspect UTI  Omar Gomez

## 2018-10-21 LAB
ANION GAP SERPL CALCULATED.3IONS-SCNC: 3 MMOL/L (ref 4–13)
BASOPHILS # BLD AUTO: 0.02 THOUSANDS/ΜL (ref 0–0.1)
BASOPHILS NFR BLD AUTO: 0 % (ref 0–1)
BUN SERPL-MCNC: 23 MG/DL (ref 5–25)
CALCIUM SERPL-MCNC: 8.4 MG/DL (ref 8.3–10.1)
CHLORIDE SERPL-SCNC: 109 MMOL/L (ref 100–108)
CO2 SERPL-SCNC: 34 MMOL/L (ref 21–32)
CREAT SERPL-MCNC: 1.22 MG/DL (ref 0.6–1.3)
EOSINOPHIL # BLD AUTO: 0.38 THOUSAND/ΜL (ref 0–0.61)
EOSINOPHIL NFR BLD AUTO: 6 % (ref 0–6)
ERYTHROCYTE [DISTWIDTH] IN BLOOD BY AUTOMATED COUNT: 13.7 % (ref 11.6–15.1)
GFR SERPL CREATININE-BSD FRML MDRD: 38 ML/MIN/1.73SQ M
GLUCOSE SERPL-MCNC: 84 MG/DL (ref 65–140)
HCT VFR BLD AUTO: 34.4 % (ref 34.8–46.1)
HGB BLD-MCNC: 10.8 G/DL (ref 11.5–15.4)
IMM GRANULOCYTES # BLD AUTO: 0.05 THOUSAND/UL (ref 0–0.2)
IMM GRANULOCYTES NFR BLD AUTO: 1 % (ref 0–2)
LYMPHOCYTES # BLD AUTO: 1.65 THOUSANDS/ΜL (ref 0.6–4.47)
LYMPHOCYTES NFR BLD AUTO: 26 % (ref 14–44)
MCH RBC QN AUTO: 31.4 PG (ref 26.8–34.3)
MCHC RBC AUTO-ENTMCNC: 31.4 G/DL (ref 31.4–37.4)
MCV RBC AUTO: 100 FL (ref 82–98)
MONOCYTES # BLD AUTO: 0.6 THOUSAND/ΜL (ref 0.17–1.22)
MONOCYTES NFR BLD AUTO: 9 % (ref 4–12)
NEUTROPHILS # BLD AUTO: 3.66 THOUSANDS/ΜL (ref 1.85–7.62)
NEUTS SEG NFR BLD AUTO: 58 % (ref 43–75)
NRBC BLD AUTO-RTO: 0 /100 WBCS
NT-PROBNP SERPL-MCNC: 411 PG/ML
PLATELET # BLD AUTO: 304 THOUSANDS/UL (ref 149–390)
PMV BLD AUTO: 8.9 FL (ref 8.9–12.7)
POTASSIUM SERPL-SCNC: 3.9 MMOL/L (ref 3.5–5.3)
RBC # BLD AUTO: 3.44 MILLION/UL (ref 3.81–5.12)
SODIUM SERPL-SCNC: 146 MMOL/L (ref 136–145)
WBC # BLD AUTO: 6.36 THOUSAND/UL (ref 4.31–10.16)

## 2018-10-21 PROCEDURE — 94668 MNPJ CHEST WALL SBSQ: CPT

## 2018-10-21 PROCEDURE — 99232 SBSQ HOSP IP/OBS MODERATE 35: CPT | Performed by: PHYSICIAN ASSISTANT

## 2018-10-21 PROCEDURE — 85025 COMPLETE CBC W/AUTO DIFF WBC: CPT | Performed by: PHYSICIAN ASSISTANT

## 2018-10-21 PROCEDURE — 80048 BASIC METABOLIC PNL TOTAL CA: CPT | Performed by: PHYSICIAN ASSISTANT

## 2018-10-21 PROCEDURE — 94640 AIRWAY INHALATION TREATMENT: CPT

## 2018-10-21 PROCEDURE — 94760 N-INVAS EAR/PLS OXIMETRY 1: CPT

## 2018-10-21 PROCEDURE — 94660 CPAP INITIATION&MGMT: CPT

## 2018-10-21 PROCEDURE — 83880 ASSAY OF NATRIURETIC PEPTIDE: CPT | Performed by: PHYSICIAN ASSISTANT

## 2018-10-21 RX ORDER — LEVALBUTEROL 1.25 MG/.5ML
1.25 SOLUTION, CONCENTRATE RESPIRATORY (INHALATION)
Status: DISCONTINUED | OUTPATIENT
Start: 2018-10-21 | End: 2018-10-22 | Stop reason: HOSPADM

## 2018-10-21 RX ADMIN — LEVOTHYROXINE SODIUM 75 MCG: 75 TABLET ORAL at 08:30

## 2018-10-21 RX ADMIN — OXYBUTYNIN CHLORIDE 5 MG: 5 TABLET, EXTENDED RELEASE ORAL at 08:30

## 2018-10-21 RX ADMIN — FERROUS SULFATE TAB 325 MG (65 MG ELEMENTAL FE) 325 MG: 325 (65 FE) TAB at 08:30

## 2018-10-21 RX ADMIN — IPRATROPIUM BROMIDE 0.5 MG: 0.5 SOLUTION RESPIRATORY (INHALATION) at 20:50

## 2018-10-21 RX ADMIN — LEVALBUTEROL 1.25 MG: 1.25 SOLUTION, CONCENTRATE RESPIRATORY (INHALATION) at 20:50

## 2018-10-21 RX ADMIN — FERROUS SULFATE TAB 325 MG (65 MG ELEMENTAL FE) 325 MG: 325 (65 FE) TAB at 16:08

## 2018-10-21 RX ADMIN — HEPARIN SODIUM 5000 UNITS: 5000 INJECTION, SOLUTION INTRAVENOUS; SUBCUTANEOUS at 05:17

## 2018-10-21 RX ADMIN — HEPARIN SODIUM 5000 UNITS: 5000 INJECTION, SOLUTION INTRAVENOUS; SUBCUTANEOUS at 21:06

## 2018-10-21 RX ADMIN — CEFTRIAXONE 1000 MG: 1 INJECTION, SOLUTION INTRAVENOUS at 10:41

## 2018-10-21 RX ADMIN — HEPARIN SODIUM 5000 UNITS: 5000 INJECTION, SOLUTION INTRAVENOUS; SUBCUTANEOUS at 13:42

## 2018-10-21 RX ADMIN — IPRATROPIUM BROMIDE AND ALBUTEROL SULFATE 3 ML: .5; 3 SOLUTION RESPIRATORY (INHALATION) at 14:23

## 2018-10-21 NOTE — ASSESSMENT & PLAN NOTE
Resolved today  Possibly in the setting of transient hypoxia  Was noted to be lethargic yesterday 10/20/18 and placed on Vapotherm due to ABG  CT chest showed pulmonary edema the patient was given 1 time dose of IV lasix  Check echocardiogram   Will attempt to wean off vapotherm

## 2018-10-21 NOTE — ASSESSMENT & PLAN NOTE
Noted by nursing staff - no known history of dementia and usually alert and oriented x3  On admission, patient is oriented x3 so appears at her baseline  It is unclear if the patient was wearing her oxygen that she is supposed to wear 24 hours at the time the mental status change, possible mental status change due to hypoxia  CT head is unremarkable  MRI head  - showing old ischemic disease

## 2018-10-21 NOTE — PROGRESS NOTES
Progress Note - Oral Comings 1/3/1924, 80 y o  female MRN: [id number]    Unit/Bed#: 424-01 Encounter: [id number]    Primary Care Provider: Kaye Henley DO   Date and time admitted to hospital: 10/17/2018  2:41 PM        Metabolic encephalopathy   Assessment & Plan    Resolved today  Possibly in the setting of transient hypoxia  Was noted to be lethargic yesterday 10/20/18 and placed on Vapotherm due to ABG  CT chest showed pulmonary edema the patient was given 1 time dose of IV lasix  Check echocardiogram   Will attempt to wean off vapotherm  * Generalized weakness   Assessment & Plan    Improved today  Continue PT/OT  MRI shows no acute changes  Acute cystitis without hematuria   Assessment & Plan    Urine culture showed mixed contaminants  Will discontinue IV Rocephin  Slow transit constipation   Assessment & Plan    Chronic, continue bowel regimen  Monitor bowel movements  Presbycusis   Assessment & Plan    Chronic  Bilateral, severe, likely age-related       Acute encephalopathy   Assessment & Plan    Noted by nursing staff - no known history of dementia and usually alert and oriented x3  On admission, patient is oriented x3 so appears at her baseline  It is unclear if the patient was wearing her oxygen that she is supposed to wear 24 hours at the time the mental status change, possible mental status change due to hypoxia  CT head is unremarkable  MRI head  - showing old ischemic disease  Other specified hypothyroidism   Assessment & Plan    TSH is 4 548  Continue levothyroxine  Repeat TSH as outpatient  Chronic respiratory failure with hypoxia [facility])   Assessment & Plan    Patient was noted to be 87% on room air when picked up by EMS  Patient noted for significant wheezing however this is patient's baseline per nursing home staff  No recorded history of asthma or COPD  No concerning findings on chest xray    Place on respiratory protocol - noted that patient uses DuoNeb p r n  At the nursing home  Unclear reason for patient having been on room air at the time of EMS examination, patient uses 2 L via nasal cannula at all times  VTE Pharmacologic Prophylaxis:   Pharmacologic: Heparin  Mechanical VTE Prophylaxis in Place: Yes    Patient Centered Rounds: I have performed bedside rounds with nursing staff today  Discussions with Specialists or Other Care Team Provider: nursing, CM, and attending    Education and Discussions with Family / Patient: patient    Time Spent for Care: 20 minutes  More than 50% of total time spent on counseling and coordination of care as described above  Current Length of Stay: 3 day(s)    Current Patient Status: Inpatient   Certification Statement: The patient will continue to require additional inpatient hospital stay due to continued monitoring of labs and hypoxia  Discharge Plan: TBD    Code Status: Level 3 - DNAR and DNI      Subjective: The patient was seen and examined  The patient is alert and oriented x 3 today  She denies any complaints  Objective:     Vitals:   Temp (24hrs), Av 2 °F (36 8 °C), Min:97 3 °F (36 3 °C), Max:98 9 °F (37 2 °C)    Temp:  [97 3 °F (36 3 °C)-98 9 °F (37 2 °C)] 97 8 °F (36 6 °C)  HR:  [67-84] 76  Resp:  [17-20] 20  BP: (111-145)/(55-67) 131/60  SpO2:  [90 %-96 %] 92 %  Body mass index is 26 83 kg/m²  Input and Output Summary (last 24 hours): Intake/Output Summary (Last 24 hours) at 10/21/18 1307  Last data filed at 10/21/18 0900   Gross per 24 hour   Intake              300 ml   Output             1225 ml   Net             -925 ml       Physical Exam:     Physical Exam   Constitutional: She is oriented to person, place, and time  Vital signs are normal  She appears well-developed and well-nourished  She is active and cooperative  Patient on Vapotherm  Cardiovascular: Normal rate and regular rhythm  Pulmonary/Chest: Effort normal and breath sounds normal  She has no wheezes  She has no rhonchi  She has no rales  Abdominal: Soft  Normal appearance and bowel sounds are normal  There is no tenderness  Neurological: She is alert and oriented to person, place, and time  No cranial nerve deficit  Skin: Skin is warm, dry and intact  Nursing note and vitals reviewed  Additional Data:     Labs:      Results from last 7 days  Lab Units 10/21/18  1158   WBC Thousand/uL 6 36   HEMOGLOBIN g/dL 10 8*   HEMATOCRIT % 34 4*   PLATELETS Thousands/uL 304   NEUTROS PCT % 58   LYMPHS PCT % 26   MONOS PCT % 9   EOS PCT % 6       Results from last 7 days  Lab Units 10/21/18  1158  10/17/18  1552   SODIUM mmol/L 146*  < > 145   POTASSIUM mmol/L 3 9  < > 4 5   CHLORIDE mmol/L 109*  < > 107   CO2 mmol/L 34*  < > 33*   BUN mg/dL 23  < > 24   CREATININE mg/dL 1 22  < > 1 30   ANION GAP mmol/L 3*  < > 5   CALCIUM mg/dL 8 4  < > 8 8   ALBUMIN g/dL  --   --  2 7*   TOTAL BILIRUBIN mg/dL  --   --  0 20   ALK PHOS U/L  --   --  93   ALT U/L  --   --  19   AST U/L  --   --  21   < > = values in this interval not displayed  Results from last 7 days  Lab Units 10/20/18  1226 10/18/18  1020   LACTIC ACID mmol/L 0 5 1 1   PROCALCITONIN ng/ml 0 10 0 10           * I Have Reviewed All Lab Data Listed Above  * Additional Pertinent Lab Tests Reviewed: All Labs Within Last 24 Hours Reviewed    Imaging:    Imaging Reports Reviewed Today Include: CT chest  Imaging Personally Reviewed by Myself Includes:  none    Recent Cultures (last 7 days):       Results from last 7 days  Lab Units 10/18/18  1300 10/18/18  0905 10/18/18  0847   BLOOD CULTURE   --  No Growth at 48 hrs  No Growth at 48 hrs    --    URINE CULTURE  50,000-59,000 cfu/ml   --   --    INFLUENZA A PCR   --   --  None Detected   INFLUENZA B PCR   --   --  None Detected   RSV PCR   --   --  None Detected       Last 24 Hours Medication List:     Current Facility-Administered Medications:  acetaminophen 650 mg Oral Q6H PRN Boris Blanca MD bisacodyl 10 mg Rectal Daily Musa Kimble MD   collagenase  Topical TID Musa Kimble MD   ferrous sulfate 325 mg Oral BID With Meals Musa Kimble MD   heparin (porcine) 5,000 Units Subcutaneous Q8H Daly Caba MD   ipratropium-albuterol 3 mL Nebulization Q6H PRN Musa Kimble MD   levothyroxine 75 mcg Oral Daily Musa Kimble MD   magnesium hydroxide 5 mL Oral Daily PRN Musa Kimble MD   oxybutynin 5 mg Oral Daily Musa Kimble MD   sodium chloride 1 spray Each Nare 4x Daily PRN Musa Kimble MD        Today, Patient Was Seen By: Gurvinder Rico PA-C    ** Please Note: Dictation voice to text software may have been used in the creation of this document   **

## 2018-10-22 ENCOUNTER — APPOINTMENT (INPATIENT)
Dept: NON INVASIVE DIAGNOSTICS | Facility: HOSPITAL | Age: 83
DRG: 070 | End: 2018-10-22
Payer: COMMERCIAL

## 2018-10-22 VITALS
OXYGEN SATURATION: 95 % | HEART RATE: 71 BPM | BODY MASS INDEX: 27.67 KG/M2 | SYSTOLIC BLOOD PRESSURE: 124 MMHG | TEMPERATURE: 97 F | DIASTOLIC BLOOD PRESSURE: 57 MMHG | WEIGHT: 150.35 LBS | HEIGHT: 62 IN | RESPIRATION RATE: 20 BRPM

## 2018-10-22 LAB
ANION GAP SERPL CALCULATED.3IONS-SCNC: 4 MMOL/L (ref 4–13)
BASOPHILS # BLD AUTO: 0.03 THOUSANDS/ΜL (ref 0–0.1)
BASOPHILS NFR BLD AUTO: 1 % (ref 0–1)
BUN SERPL-MCNC: 22 MG/DL (ref 5–25)
CALCIUM SERPL-MCNC: 8.5 MG/DL (ref 8.3–10.1)
CHLORIDE SERPL-SCNC: 110 MMOL/L (ref 100–108)
CO2 SERPL-SCNC: 33 MMOL/L (ref 21–32)
CREAT SERPL-MCNC: 1.3 MG/DL (ref 0.6–1.3)
EOSINOPHIL # BLD AUTO: 0.43 THOUSAND/ΜL (ref 0–0.61)
EOSINOPHIL NFR BLD AUTO: 7 % (ref 0–6)
ERYTHROCYTE [DISTWIDTH] IN BLOOD BY AUTOMATED COUNT: 13.8 % (ref 11.6–15.1)
GFR SERPL CREATININE-BSD FRML MDRD: 35 ML/MIN/1.73SQ M
GLUCOSE SERPL-MCNC: 90 MG/DL (ref 65–140)
HCT VFR BLD AUTO: 34.8 % (ref 34.8–46.1)
HGB BLD-MCNC: 10.8 G/DL (ref 11.5–15.4)
IMM GRANULOCYTES # BLD AUTO: 0.03 THOUSAND/UL (ref 0–0.2)
IMM GRANULOCYTES NFR BLD AUTO: 1 % (ref 0–2)
LYMPHOCYTES # BLD AUTO: 2.21 THOUSANDS/ΜL (ref 0.6–4.47)
LYMPHOCYTES NFR BLD AUTO: 35 % (ref 14–44)
MCH RBC QN AUTO: 31.2 PG (ref 26.8–34.3)
MCHC RBC AUTO-ENTMCNC: 31 G/DL (ref 31.4–37.4)
MCV RBC AUTO: 101 FL (ref 82–98)
MONOCYTES # BLD AUTO: 0.57 THOUSAND/ΜL (ref 0.17–1.22)
MONOCYTES NFR BLD AUTO: 9 % (ref 4–12)
NEUTROPHILS # BLD AUTO: 3.03 THOUSANDS/ΜL (ref 1.85–7.62)
NEUTS SEG NFR BLD AUTO: 47 % (ref 43–75)
NRBC BLD AUTO-RTO: 0 /100 WBCS
PLATELET # BLD AUTO: 331 THOUSANDS/UL (ref 149–390)
PMV BLD AUTO: 9.2 FL (ref 8.9–12.7)
POTASSIUM SERPL-SCNC: 3.8 MMOL/L (ref 3.5–5.3)
RBC # BLD AUTO: 3.46 MILLION/UL (ref 3.81–5.12)
SODIUM SERPL-SCNC: 147 MMOL/L (ref 136–145)
WBC # BLD AUTO: 6.3 THOUSAND/UL (ref 4.31–10.16)

## 2018-10-22 PROCEDURE — 93306 TTE W/DOPPLER COMPLETE: CPT | Performed by: INTERNAL MEDICINE

## 2018-10-22 PROCEDURE — 97110 THERAPEUTIC EXERCISES: CPT

## 2018-10-22 PROCEDURE — 94760 N-INVAS EAR/PLS OXIMETRY 1: CPT

## 2018-10-22 PROCEDURE — 97116 GAIT TRAINING THERAPY: CPT

## 2018-10-22 PROCEDURE — 85025 COMPLETE CBC W/AUTO DIFF WBC: CPT | Performed by: PHYSICIAN ASSISTANT

## 2018-10-22 PROCEDURE — B24BZZZ ULTRASONOGRAPHY OF HEART WITH AORTA: ICD-10-PCS | Performed by: INTERNAL MEDICINE

## 2018-10-22 PROCEDURE — 97530 THERAPEUTIC ACTIVITIES: CPT

## 2018-10-22 PROCEDURE — 94762 N-INVAS EAR/PLS OXIMTRY CONT: CPT

## 2018-10-22 PROCEDURE — 99238 HOSP IP/OBS DSCHRG MGMT 30/<: CPT | Performed by: PHYSICIAN ASSISTANT

## 2018-10-22 PROCEDURE — 93306 TTE W/DOPPLER COMPLETE: CPT

## 2018-10-22 PROCEDURE — 80048 BASIC METABOLIC PNL TOTAL CA: CPT | Performed by: PHYSICIAN ASSISTANT

## 2018-10-22 PROCEDURE — 94640 AIRWAY INHALATION TREATMENT: CPT

## 2018-10-22 RX ADMIN — LEVALBUTEROL 1.25 MG: 1.25 SOLUTION, CONCENTRATE RESPIRATORY (INHALATION) at 09:17

## 2018-10-22 RX ADMIN — FERROUS SULFATE TAB 325 MG (65 MG ELEMENTAL FE) 325 MG: 325 (65 FE) TAB at 16:20

## 2018-10-22 RX ADMIN — LEVOTHYROXINE SODIUM 75 MCG: 75 TABLET ORAL at 08:47

## 2018-10-22 RX ADMIN — IPRATROPIUM BROMIDE 0.5 MG: 0.5 SOLUTION RESPIRATORY (INHALATION) at 09:17

## 2018-10-22 RX ADMIN — HEPARIN SODIUM 5000 UNITS: 5000 INJECTION, SOLUTION INTRAVENOUS; SUBCUTANEOUS at 05:16

## 2018-10-22 RX ADMIN — ACETAMINOPHEN 650 MG: 325 TABLET, FILM COATED ORAL at 00:10

## 2018-10-22 RX ADMIN — FERROUS SULFATE TAB 325 MG (65 MG ELEMENTAL FE) 325 MG: 325 (65 FE) TAB at 08:47

## 2018-10-22 RX ADMIN — OXYBUTYNIN CHLORIDE 5 MG: 5 TABLET, EXTENDED RELEASE ORAL at 08:47

## 2018-10-22 RX ADMIN — HEPARIN SODIUM 5000 UNITS: 5000 INJECTION, SOLUTION INTRAVENOUS; SUBCUTANEOUS at 13:32

## 2018-10-22 NOTE — OCCUPATIONAL THERAPY NOTE
OT Treatment       10/22/18 1528   Therapeutic Excerise-Strength   UE Strength Yes   Right Upper Extremity- Strength   R Shoulder Flexion;ABduction; Extension;Horizontal ABduction; External rotation; Internal rotation   R Elbow Elbow extension;Elbow flexion   R Wrist Wrist flexion;Wrist extension   Equipment Dowel   R Weight/Reps/Sets 1lb Therabar 3x10   Left Upper Extremity-Strength   L Shoulder Flexion;ABduction; Extension;Horizontal ABduction; External rotation; Internal rotation   L Elbow Elbow flexion;Elbow extension   L Wrist Wrist flexion;Wrist extension   Equipment Dowel   L Weights/Reps/Sets 1lb Therabar 3x10     Pt completed UE exercises BUE to increase strength, endurance, ROM, txfrs, self cares  Pt left with call bell in reach in [facility] chair

## 2018-10-22 NOTE — ASSESSMENT & PLAN NOTE
-Resolved  -Possibly in the setting of transient hypoxia  -patient was noted to be lethargic on 10/20/18 and placed on Vapotherm  -CT chest showed pulmonary edema and the patient was given 1 time dose of IV lasix on 10/20/18   -echocardiogram showed grade 2 diastolic dysfunction, moderate stenosis of aortic valve   -proBNP normal at 411   -patient weaned off vapotherm on 10/21/18  -CT head is unremarkable  MRI head  - showing old ischemic disease

## 2018-10-22 NOTE — PLAN OF CARE
Problem: Potential for Falls  Goal: Patient will remain free of falls  INTERVENTIONS:  - Assess patient frequently for physical needs  -  Identify cognitive and physical deficits and behaviors that affect risk of falls    -  [place] fall precautions as indicated by assessment   - Educate patient/family on patient safety including physical limitations  - Instruct patient to call for assistance with activity based on assessment  - Modify environment to reduce risk of injury  - Consider OT/PT consult to assist with strengthening/mobility   Outcome: Adequate for Discharge      Problem: Prexisting or High Potential for Compromised Skin Integrity  Goal: Skin integrity is maintained or improved  INTERVENTIONS:  - Identify patients at risk for skin breakdown  - Assess and monitor skin integrity  - Assess and monitor nutrition and hydration status  - Monitor labs (i e  albumin)  - Assess for incontinence   - Turn and reposition patient  - Assist with mobility/ambulation  - Relieve pressure over bony prominences  - Avoid friction and shearing  - Provide appropriate hygiene as needed including keeping skin clean and dry  - Evaluate need for skin moisturizer/barrier cream  - Collaborate with interdisciplinary team (i e  Nutrition, Rehabilitation, etc )   - Patient/family teaching   Outcome: Adequate for Discharge      Problem: SAFETY ADULT  Goal: Maintain or return to baseline ADL function  INTERVENTIONS:  -  Assess patient's ability to carry out ADLs; assess patient's baseline for ADL function and identify physical deficits which impact ability to perform ADLs (bathing, care of mouth/teeth, toileting, grooming, dressing, etc )  - Assess/evaluate cause of self-care deficits   - Assess range of motion  - Assess patient's mobility; develop plan if impaired  - Assess patient's need for assistive devices and provide as appropriate  - Encourage maximum independence but intervene and supervise when necessary  ¯ Involve family in performance of ADLs  ¯ Assess for home care needs following discharge   ¯ Request OT consult to assist with ADL evaluation and planning for discharge  ¯ Provide patient education as appropriate   Outcome: Adequate for Discharge    Goal: Maintain or return mobility status to optimal level  INTERVENTIONS:  - Assess patient's baseline mobility status (ambulation, transfers, stairs, etc )    - Identify cognitive and physical deficits and behaviors that affect mobility  - Identify mobility aids required to assist with transfers and/or ambulation (gait belt, sit-to-stand, lift, walker, cane, etc )  - [place] fall precautions as indicated by assessment  - Record patient progress and toleration of activity level on Mobility SBAR; progress patient to next Phase/Stage  - Instruct patient to call for assistance with activity based on assessment  - Request Rehabilitation consult to assist with strengthening/weightbearing, etc    Outcome: Adequate for Discharge      Problem: DISCHARGE PLANNING  Goal: Discharge to home or other facility with appropriate resources  INTERVENTIONS:  - Identify barriers to discharge w/patient and caregiver  - Arrange for needed discharge resources and transportation as appropriate  - Identify discharge learning needs (meds, wound care, etc )  - Arrange for interpretive services to assist at discharge as needed  - Refer to Case Management Department for coordinating discharge planning if the patient needs post-hospital services based on physician/advanced practitioner order or complex needs related to functional status, cognitive ability, or social support system   Outcome: Adequate for Discharge      Problem: Knowledge Deficit  Goal: Patient/family/caregiver demonstrates understanding of disease process, treatment plan, medications, and discharge instructions  Complete learning assessment and assess knowledge base    Interventions:  - Provide teaching at level of understanding  - Provide teaching via preferred learning methods   Outcome: Adequate for Discharge

## 2018-10-22 NOTE — PLAN OF CARE
Problem: OCCUPATIONAL THERAPY ADULT  Goal: Performs self-care activities at highest level of function for planned discharge setting  See evaluation for individualized goals  Treatment Interventions: ADL retraining, Functional transfer training, UE strengthening/ROM, Endurance training, Patient/family training, Activityengagement          See flowsheet documentation for full assessment, interventions and recommendations  Outcome: Progressing  Limitation: Decreased ADL status, Decreased UE strength, Decreased Safe judgement during ADL, Decreased endurance, Decreased self-care trans, Decreased high-level ADLs     Assessment: Pt is a 80 y o  female seen for OT evaluation s/p admit to [facility] on 10/17/2018 w/ Generalized weakness  Comorbidities affecting pt's functional performance at time of assessment include: dementia  Personal factors affecting pt at time of IE include:difficulty performing ADLS, difficulty performing IADLS  and decreased initiation and engagement   Prior to admission, pt was (A) with ADLs/IADLs with use of RW for short distances and w/c mobility  Upon evaluation: Pt requires (S)-min (A) level with use of RW during functional mobility 2* the following deficits impacting occupational performance: weakness, decreased strength, decreased balance, decreased tolerance, impaired initiation and decreased safety awareness  Pt to benefit from continued skilled OT tx while in the hospital to address deficits as defined above and maximize level of functional independence w ADL's and functional mobility  Occupational Performance areas to address include: grooming, bathing/shower, toilet hygiene, dressing, functional mobility, community mobility and clothing management  From OT standpoint, recommendation at time of d/c would be return to SNF with (A) PRN        OT Discharge Recommendation: 24 hour supervision/assist

## 2018-10-22 NOTE — SOCIAL WORK
Pt returning to [facility] on this date  [facility]  to transport pt at 5:30pm and the ambulance will stop by [facility] to  oxygen to bring here to transport pt  Emeka Ramon in admissions dept at [facility] aware of  time

## 2018-10-22 NOTE — PLAN OF CARE
Problem: PHYSICAL THERAPY ADULT  Goal: Performs mobility at highest level of function for planned discharge setting  See evaluation for individualized goals  Outcome: Progressing  Prognosis: Good  Problem List: Decreased strength, Decreased endurance, Decreased mobility, Decreased safety awareness  Assessment: Pt fatiques quickly during activity, pt cooperative and pleasant during treatment        Recommendation: Short-term skilled PT, Long-term skilled nursing home placement     PT - OK to Discharge: Yes    See flowsheet documentation for full assessment

## 2018-10-22 NOTE — NURSING NOTE
Patient transported to [facility] via wheelchair with [facility]  Belongings sent with patient  Patient d/c on 2L O2  Report called to [place] at [facility]  Patient's Marian Barriga notified of transfer  Patient offered no complaints at time of d/c

## 2018-10-22 NOTE — PLAN OF CARE
Problem: Potential for Falls  Goal: Patient will remain free of falls  INTERVENTIONS:  - Assess patient frequently for physical needs  -  Identify cognitive and physical deficits and behaviors that affect risk of falls    -  [place] fall precautions as indicated by assessment   - Educate patient/family on patient safety including physical limitations  - Instruct patient to call for assistance with activity based on assessment  - Modify environment to reduce risk of injury  - Consider OT/PT consult to assist with strengthening/mobility   Outcome: Adequate for Discharge      Problem: Prexisting or High Potential for Compromised Skin Integrity  Goal: Skin integrity is maintained or improved  INTERVENTIONS:  - Identify patients at risk for skin breakdown  - Assess and monitor skin integrity  - Assess and monitor nutrition and hydration status  - Monitor labs (i e  albumin)  - Assess for incontinence   - Turn and reposition patient  - Assist with mobility/ambulation  - Relieve pressure over bony prominences  - Avoid friction and shearing  - Provide appropriate hygiene as needed including keeping skin clean and dry  - Evaluate need for skin moisturizer/barrier cream  - Collaborate with interdisciplinary team (i e  Nutrition, Rehabilitation, etc )   - Patient/family teaching   Outcome: Adequate for Discharge      Problem: SAFETY ADULT  Goal: Maintain or return to baseline ADL function  INTERVENTIONS:  -  Assess patient's ability to carry out ADLs; assess patient's baseline for ADL function and identify physical deficits which impact ability to perform ADLs (bathing, care of mouth/teeth, toileting, grooming, dressing, etc )  - Assess/evaluate cause of self-care deficits   - Assess range of motion  - Assess patient's mobility; develop plan if impaired  - Assess patient's need for assistive devices and provide as appropriate  - Encourage maximum independence but intervene and supervise when necessary  ¯ Involve family in performance of ADLs  ¯ Assess for home care needs following discharge   ¯ Request OT consult to assist with ADL evaluation and planning for discharge  ¯ Provide patient education as appropriate   Outcome: Adequate for Discharge    Goal: Maintain or return mobility status to optimal level  INTERVENTIONS:  - Assess patient's baseline mobility status (ambulation, transfers, stairs, etc )    - Identify cognitive and physical deficits and behaviors that affect mobility  - Identify mobility aids required to assist with transfers and/or ambulation (gait belt, sit-to-stand, lift, walker, cane, etc )  - [place] fall precautions as indicated by assessment  - Record patient progress and toleration of activity level on Mobility SBAR; progress patient to next Phase/Stage  - Instruct patient to call for assistance with activity based on assessment  - Request Rehabilitation consult to assist with strengthening/weightbearing, etc    Outcome: Adequate for Discharge      Problem: DISCHARGE PLANNING  Goal: Discharge to home or other facility with appropriate resources  INTERVENTIONS:  - Identify barriers to discharge w/patient and caregiver  - Arrange for needed discharge resources and transportation as appropriate  - Identify discharge learning needs (meds, wound care, etc )  - Arrange for interpretive services to assist at discharge as needed  - Refer to Case Management Department for coordinating discharge planning if the patient needs post-hospital services based on physician/advanced practitioner order or complex needs related to functional status, cognitive ability, or social support system   Outcome: Adequate for Discharge      Problem: Knowledge Deficit  Goal: Patient/family/caregiver demonstrates understanding of disease process, treatment plan, medications, and discharge instructions  Complete learning assessment and assess knowledge base    Interventions:  - Provide teaching at level of understanding  - Provide teaching via preferred learning methods   Outcome: Adequate for Discharge

## 2018-10-22 NOTE — DISCHARGE SUMMARY
Discharge- Rukhsana Pantoja 1/3/1924, 80 y o  female MRN: [id number]    Unit/Bed#: 424-01 Encounter: [id number]    Primary Care Provider: Rubi Chavez DO   Date and time admitted to hospital: 10/17/2018  2:41 PM        Metabolic encephalopathy   Assessment & Plan    -Resolved  -Possibly in the setting of transient hypoxia  -patient was noted to be lethargic on 10/20/18 and placed on Vapotherm  -CT chest showed pulmonary edema and the patient was given 1 time dose of IV lasix on 10/20/18   -echocardiogram showed grade 2 diastolic dysfunction, moderate stenosis of aortic valve   -proBNP normal at 411   -patient weaned off vapotherm on 10/21/18  -CT head is unremarkable  MRI head  - showing old ischemic disease  * Generalized weakness   Assessment & Plan    -resolved   -MRI shows no acute changes  Acute cystitis without hematuria   Assessment & Plan    -Urine culture showed mixed contaminants  -IV Rocephin discontinued on 10/21/18  Slow transit constipation   Assessment & Plan    Chronic, continue bowel regimen  Monitor bowel movements  Presbycusis   Assessment & Plan    Chronic  Bilateral, severe, likely age-related       Other specified hypothyroidism   Assessment & Plan    TSH is 4 548  Continue levothyroxine  Repeat TSH as outpatient  Chronic respiratory failure with hypoxia [facility])   Assessment & Plan    Patient was noted to be 87% on room air when picked up by EMS  Patient noted for significant wheezing however this is patient's baseline per nursing home staff  No recorded history of asthma or COPD  No concerning findings on chest xray  Place on respiratory protocol - noted that patient uses DuoNeb p r n  At the nursing home  Unclear reason for patient having been on room air at the time of EMS examination, patient uses 2 L via nasal cannula at all times               Discharging Physician / Practitioner: Arjun Cardona PA-C  PCP: Rubi Chavez DO  Admission Date:   Admission Orders     Ordered        10/18/18 1158  Inpatient Admission  Once         10/17/18 1655  Place in Observation (expected length of stay for this patient is less than two midnights)  Once             Discharge Date: 10/22/18    Resolved Problems  Date Reviewed: 10/22/2018          Resolved    Lethargy 10/20/2018     Resolved by  Milton Lance PA-C          Consultations During Hospital Stay:  · none    Procedures Performed:     Xr Chest Portable    Result Date: 10/20/2018  Impression: Mild central vascular prominence  No focal consolidation  Workstation performed: [id number]     Xr Chest Portable    Result Date: 10/18/2018  Impression: No acute cardiopulmonary disease  Workstation performed: [id number]     Xr Chest 2 Views    Result Date: 10/17/2018  Impression: No acute cardiopulmonary disease  Workstation performed: [id number]     Ct Head Without Contrast    Result Date: 10/17/2018  Impression: No acute intracranial abnormality  Bilateral cerebellar calcifications can be seen in the setting of hyperthyroidism  Sinus disease  Workstation performed: [id number]     Ct Chest Wo Contrast    Result Date: 10/20/2018  Impression: 1  Scattered patchy opacity in the right upper lobe posterolaterally may be related to atelectasis or pneumonia  There is patchy consolidation in the bilateral lobes which may be related to atelectasis or pneumonia  2   Generalized prominence of the pulmonary vasculature with interlobular septal thickening which may be related to mild to moderate pulmonary edema  3  Mildly aneurysmal ascending thoracic aorta  Prominent main pulmonary artery suggests pulmonary artery hypertension  4  Suspicious left breast mass measuring up to 4 1 cm concerning for malignancy  Several prominent left axillary lymph node suspicious for metastasis   I personally discussed this study with Sammi Leal on 10/20/2018 at 3:53 PM  Workstation performed: [id number]     Mri Brain Wo Contrast    Result Date: 10/18/2018  · Impression: Mild chronic microvascular ischemic disease No acute ischemic disease Age-related atrophy Metabolic or degenerative calcifications involving the globus paladi bilaterally as well as the cerebellar dentate nuclei, consistent with CT Near complete opacification of the sphenoid cells, likely inflammatory  Mild mucosal thickening noted elsewhere within the sinuses  Consistent with CT  Continued follow-up recommended  Workstation performed: [id number]     Significant Findings / Test Results:     · none    Incidental Findings:   · none     Test Results Pending at Discharge (will require follow up):   · none     Outpatient Tests Requested:  · none    Complications:  none    Reason for Admission: confusion, generalized weakness    Hospital Course: Brii Callahan is a 80 y o  female patient who originally presented to the hospital on 10/17/2018 due to confusion and generalized [facility]  Per nursing staff, the patient usually is alert and oriented x3 and the mental status change was unlike her  The patient was also noted for lower extremity weakness and inability to transfer, which she can usually do  The patient was also complaining of bilateral upper extremity weakness at that time  The EMS was called and the patient was noted to be hypoxic at 87% on room air, it was unclear why the patient was on room air although she is to wear her oxygen at all times  She reports a 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  She denies vision changed  She reports baseline hearing loss  She denies upper extremity weakness or confusion  She admits to difficulty breathing which may be slightly worse from usual, however, she is unsure  She denies chills or fever  She denies nausea vomiting or diarrhea and has chronic constipation  She denies chest pain or palpitations      Please see above list of diagnoses and related plan for additional information  Condition at Discharge: good     Discharge Day Visit / Exam:     Subjective:    Vitals: Blood Pressure: 124/57 (10/22/18 1500)  Pulse: 71 (10/22/18 1500)  Temperature: (!) 97 °F (36 1 °C) (10/22/18 1500)  Temp Source: Temporal (10/22/18 1500)  Respirations: 20 (10/22/18 1500)  Height: 5' 2" (157 5 cm) (10/17/18 1900)  Weight - Scale: 68 2 kg (150 lb 5 7 oz) (10/22/18 0557)  SpO2: 95 % (10/22/18 1500)  Exam:   Physical Exam   Constitutional: She is oriented to person, place, and time  She appears well-developed and well-nourished  HENT:   Head: Normocephalic and atraumatic  Cardiovascular: Normal rate and regular rhythm  Pulmonary/Chest: Effort normal and breath sounds normal    Abdominal: Soft  Bowel sounds are normal  She exhibits no distension  There is no tenderness  Neurological: She is alert and oriented to person, place, and time  Skin: Skin is warm and dry  Nursing note and vitals reviewed  Discussion with Family: n/a    Discharge instructions/Information to patient and family:   See after visit summary for information provided to patient and family  Provisions for Follow-Up Care:  See after visit summary for information related to follow-up care and any pertinent home health orders  Disposition:     Other [facility]  For Discharges to [facility]:   · Not Applicable to this Patient - Not Applicable to this Patient    Planned Readmission: no     Discharge Statement:  I spent 25 minutes discharging the patient  This time was spent on the day of discharge  I had direct contact with the patient on the day of discharge  Greater than 50% of the total time was spent examining patient, answering all patient questions, arranging and discussing plan of care with patient as well as directly providing post-discharge instructions    Additional time then spent on discharge activities  Discharge Medications:  See after visit summary for reconciled discharge medications provided to patient and family        ** Please Note: This note has been constructed using a voice recognition system **

## 2018-10-22 NOTE — SOCIAL WORK
Spoke with Odilon Jordan in admissions department at [facility] who stated they received the authorization number for pt to return their on dc

## 2018-10-22 NOTE — PHYSICAL THERAPY NOTE
PT note     10/22/18 1254   Pain Assessment   Pain Assessment No/denies pain   Restrictions/Precautions   Weight Bearing Precautions Per Order No   Other Precautions Bed Alarm; Chair Alarm   Cognition   Overall Cognitive Status [facility]   Arousal/Participation Alert   Attention Within functional limits   Orientation Level Oriented X4   Memory Within functional limits   Following Commands Follows all commands and directions without difficulty   Bed Mobility   Supine to Sit 5  Supervision   Additional items Bedrails   Additional Comments pt on 2L O2   Transfers   Sit to Stand 4  Minimal assistance   Additional items Assist x 1   Stand to Sit 4  Minimal assistance   Additional items Assist x 1   Ambulation/Elevation   Gait pattern Short stride; Foward flexed   Assistive Device Rolling walker   Distance 65ft   Endurance Deficit   Endurance Deficit Yes   Endurance Deficit Description limited by activity tolerance and fatique   Activity Tolerance   Activity Tolerance Patient limited by fatigue   Assessment   Prognosis Good   Problem List Decreased strength;Decreased endurance;Decreased mobility; Decreased safety awareness   Assessment Pt fatiques quickly during activity, pt cooperative and pleasant during treatment   Goals   Patient Goals to feel stronger   Plan   Treatment/Interventions Functional transfer training;Gait training;Bed mobility; Endurance training   Recommendation   Recommendation Short-term skilled PT;Long-term skilled nursing home placement     Pt amb with RW 65 ft with min assist of 1 2L o2  Pt fatiques quickly   Pt out of bed in chair following treatment with chair alarm attached

## 2018-10-23 LAB
BACTERIA BLD CULT: NORMAL
BACTERIA BLD CULT: NORMAL

## 2018-11-09 LAB
ATRIAL RATE: 94 BPM
ATRIAL RATE: 96 BPM
P AXIS: 92 DEGREES
PR INTERVAL: 162 MS
QRS AXIS: 3 DEGREES
QRS AXIS: 5 DEGREES
QRSD INTERVAL: 132 MS
QRSD INTERVAL: 140 MS
QT INTERVAL: 428 MS
QT INTERVAL: 432 MS
QTC INTERVAL: 537 MS
QTC INTERVAL: 540 MS
T WAVE AXIS: 21 DEGREES
T WAVE AXIS: 7 DEGREES
VENTRICULAR RATE: 94 BPM
VENTRICULAR RATE: 95 BPM

## 2018-11-09 PROCEDURE — 93010 ELECTROCARDIOGRAM REPORT: CPT | Performed by: INTERNAL MEDICINE

## 2018-12-05 ENCOUNTER — APPOINTMENT (EMERGENCY)
Dept: RADIOLOGY | Facility: HOSPITAL | Age: 83
DRG: 689 | End: 2018-12-05
Payer: COMMERCIAL

## 2018-12-05 ENCOUNTER — HOSPITAL ENCOUNTER (INPATIENT)
Facility: HOSPITAL | Age: 83
LOS: 6 days | Discharge: NURSING, REHAB OR LONG-TERM CARE | DRG: 689 | End: 2018-12-11
Attending: FAMILY MEDICINE | Admitting: INTERNAL MEDICINE
Payer: COMMERCIAL

## 2018-12-05 ENCOUNTER — APPOINTMENT (EMERGENCY)
Dept: CT IMAGING | Facility: HOSPITAL | Age: 83
DRG: 689 | End: 2018-12-05
Payer: COMMERCIAL

## 2018-12-05 ENCOUNTER — APPOINTMENT (INPATIENT)
Dept: ULTRASOUND IMAGING | Facility: HOSPITAL | Age: 83
DRG: 689 | End: 2018-12-05
Payer: COMMERCIAL

## 2018-12-05 DIAGNOSIS — R29.810 FACIAL DROOP: ICD-10-CM

## 2018-12-05 DIAGNOSIS — R41.82 ALTERED MENTAL STATUS: Primary | ICD-10-CM

## 2018-12-05 DIAGNOSIS — E03.9 ACQUIRED HYPOTHYROIDISM: ICD-10-CM

## 2018-12-05 DIAGNOSIS — R47.9 SPEECH ABNORMALITY: ICD-10-CM

## 2018-12-05 DIAGNOSIS — N30.01 ACUTE CYSTITIS WITH HEMATURIA: ICD-10-CM

## 2018-12-05 PROBLEM — H53.9 VISUAL DISTURBANCE: Status: ACTIVE | Noted: 2018-12-05

## 2018-12-05 PROBLEM — I45.10 RIGHT BUNDLE BRANCH BLOCK: Status: ACTIVE | Noted: 2018-12-05

## 2018-12-05 PROBLEM — N18.4 CKD (CHRONIC KIDNEY DISEASE) STAGE 4, GFR 15-29 ML/MIN (HCC): Status: ACTIVE | Noted: 2018-12-05

## 2018-12-05 PROBLEM — D53.9 MACROCYTIC ANEMIA: Status: ACTIVE | Noted: 2018-12-05

## 2018-12-05 LAB
ABO GROUP BLD: NORMAL
ALBUMIN SERPL BCP-MCNC: 2.8 G/DL (ref 3.5–5)
ALP SERPL-CCNC: 92 U/L (ref 46–116)
ALT SERPL W P-5'-P-CCNC: 26 U/L (ref 12–78)
ANION GAP SERPL CALCULATED.3IONS-SCNC: 3 MMOL/L (ref 4–13)
APTT PPP: 36 SECONDS (ref 26–38)
AST SERPL W P-5'-P-CCNC: 17 U/L (ref 5–45)
ATRIAL RATE: 62 BPM
BACTERIA UR QL AUTO: ABNORMAL /HPF
BILIRUB SERPL-MCNC: 0.2 MG/DL (ref 0.2–1)
BILIRUB UR QL STRIP: NEGATIVE
BLD GP AB SCN SERPL QL: NEGATIVE
BUN SERPL-MCNC: 39 MG/DL (ref 5–25)
CALCIUM SERPL-MCNC: 8.6 MG/DL (ref 8.3–10.1)
CHLORIDE SERPL-SCNC: 106 MMOL/L (ref 100–108)
CLARITY UR: ABNORMAL
CO2 SERPL-SCNC: 33 MMOL/L (ref 21–32)
COLOR UR: YELLOW
CREAT SERPL-MCNC: 1.24 MG/DL (ref 0.6–1.3)
ERYTHROCYTE [DISTWIDTH] IN BLOOD BY AUTOMATED COUNT: 14.8 % (ref 11.6–15.1)
GFR SERPL CREATININE-BSD FRML MDRD: 37 ML/MIN/1.73SQ M
GLUCOSE SERPL-MCNC: 78 MG/DL (ref 65–140)
GLUCOSE UR STRIP-MCNC: NEGATIVE MG/DL
HCT VFR BLD AUTO: 36.5 % (ref 34.8–46.1)
HGB BLD-MCNC: 11.3 G/DL (ref 11.5–15.4)
HGB UR QL STRIP.AUTO: ABNORMAL
HOLD SPECIMEN: NORMAL
INR PPP: 0.98 (ref 0.86–1.17)
KETONES UR STRIP-MCNC: NEGATIVE MG/DL
LEUKOCYTE ESTERASE UR QL STRIP: ABNORMAL
MCH RBC QN AUTO: 31.2 PG (ref 26.8–34.3)
MCHC RBC AUTO-ENTMCNC: 31 G/DL (ref 31.4–37.4)
MCV RBC AUTO: 101 FL (ref 82–98)
NITRITE UR QL STRIP: POSITIVE
NON-SQ EPI CELLS URNS QL MICRO: ABNORMAL /HPF
P AXIS: 62 DEGREES
PH UR STRIP.AUTO: 6 [PH] (ref 4.5–8)
PLATELET # BLD AUTO: 310 THOUSANDS/UL (ref 149–390)
PMV BLD AUTO: 9.7 FL (ref 8.9–12.7)
POTASSIUM SERPL-SCNC: 4.6 MMOL/L (ref 3.5–5.3)
PR INTERVAL: 198 MS
PROT SERPL-MCNC: 7.2 G/DL (ref 6.4–8.2)
PROT UR STRIP-MCNC: ABNORMAL MG/DL
PROTHROMBIN TIME: 12.5 SECONDS (ref 11.8–14.2)
QRS AXIS: 31 DEGREES
QRSD INTERVAL: 154 MS
QT INTERVAL: 506 MS
QTC INTERVAL: 513 MS
RBC # BLD AUTO: 3.62 MILLION/UL (ref 3.81–5.12)
RBC #/AREA URNS AUTO: ABNORMAL /HPF
RH BLD: POSITIVE
SODIUM SERPL-SCNC: 142 MMOL/L (ref 136–145)
SP GR UR STRIP.AUTO: 1.02 (ref 1–1.03)
SPECIMEN EXPIRATION DATE: NORMAL
T WAVE AXIS: 26 DEGREES
TROPONIN I SERPL-MCNC: <0.02 NG/ML
UROBILINOGEN UR QL STRIP.AUTO: 0.2 E.U./DL
VENTRICULAR RATE: 62 BPM
WBC # BLD AUTO: 7.55 THOUSAND/UL (ref 4.31–10.16)
WBC #/AREA URNS AUTO: ABNORMAL /HPF

## 2018-12-05 PROCEDURE — 93880 EXTRACRANIAL BILAT STUDY: CPT

## 2018-12-05 PROCEDURE — 93880 EXTRACRANIAL BILAT STUDY: CPT | Performed by: SURGERY

## 2018-12-05 PROCEDURE — 71045 X-RAY EXAM CHEST 1 VIEW: CPT

## 2018-12-05 PROCEDURE — 86900 BLOOD TYPING SEROLOGIC ABO: CPT | Performed by: PHYSICIAN ASSISTANT

## 2018-12-05 PROCEDURE — 93005 ELECTROCARDIOGRAM TRACING: CPT

## 2018-12-05 PROCEDURE — 80053 COMPREHEN METABOLIC PANEL: CPT | Performed by: PHYSICIAN ASSISTANT

## 2018-12-05 PROCEDURE — 99285 EMERGENCY DEPT VISIT HI MDM: CPT

## 2018-12-05 PROCEDURE — 86901 BLOOD TYPING SEROLOGIC RH(D): CPT | Performed by: PHYSICIAN ASSISTANT

## 2018-12-05 PROCEDURE — 85730 THROMBOPLASTIN TIME PARTIAL: CPT | Performed by: PHYSICIAN ASSISTANT

## 2018-12-05 PROCEDURE — 80047 BASIC METABLC PNL IONIZED CA: CPT

## 2018-12-05 PROCEDURE — 85014 HEMATOCRIT: CPT

## 2018-12-05 PROCEDURE — 87186 SC STD MICRODIL/AGAR DIL: CPT | Performed by: PHYSICIAN ASSISTANT

## 2018-12-05 PROCEDURE — 85610 PROTHROMBIN TIME: CPT | Performed by: PHYSICIAN ASSISTANT

## 2018-12-05 PROCEDURE — 87086 URINE CULTURE/COLONY COUNT: CPT | Performed by: PHYSICIAN ASSISTANT

## 2018-12-05 PROCEDURE — 36415 COLL VENOUS BLD VENIPUNCTURE: CPT | Performed by: PHYSICIAN ASSISTANT

## 2018-12-05 PROCEDURE — 87077 CULTURE AEROBIC IDENTIFY: CPT | Performed by: PHYSICIAN ASSISTANT

## 2018-12-05 PROCEDURE — 99223 1ST HOSP IP/OBS HIGH 75: CPT | Performed by: INTERNAL MEDICINE

## 2018-12-05 PROCEDURE — 86850 RBC ANTIBODY SCREEN: CPT | Performed by: PHYSICIAN ASSISTANT

## 2018-12-05 PROCEDURE — 81001 URINALYSIS AUTO W/SCOPE: CPT | Performed by: PHYSICIAN ASSISTANT

## 2018-12-05 PROCEDURE — 93010 ELECTROCARDIOGRAM REPORT: CPT | Performed by: INTERNAL MEDICINE

## 2018-12-05 PROCEDURE — 85027 COMPLETE CBC AUTOMATED: CPT | Performed by: PHYSICIAN ASSISTANT

## 2018-12-05 PROCEDURE — 70450 CT HEAD/BRAIN W/O DYE: CPT

## 2018-12-05 PROCEDURE — 84484 ASSAY OF TROPONIN QUANT: CPT | Performed by: PHYSICIAN ASSISTANT

## 2018-12-05 RX ORDER — ACETAMINOPHEN 325 MG/1
650 TABLET ORAL EVERY 6 HOURS PRN
Status: DISCONTINUED | OUTPATIENT
Start: 2018-12-05 | End: 2018-12-11 | Stop reason: HOSPADM

## 2018-12-05 RX ORDER — LEVOTHYROXINE SODIUM 88 UG/1
88 TABLET ORAL
Status: DISCONTINUED | OUTPATIENT
Start: 2018-12-06 | End: 2018-12-06

## 2018-12-05 RX ORDER — CEFTRIAXONE 1 G/50ML
1000 INJECTION, SOLUTION INTRAVENOUS EVERY 24 HOURS
Status: DISCONTINUED | OUTPATIENT
Start: 2018-12-06 | End: 2018-12-09

## 2018-12-05 RX ORDER — CEFTRIAXONE 1 G/50ML
1000 INJECTION, SOLUTION INTRAVENOUS ONCE
Status: COMPLETED | OUTPATIENT
Start: 2018-12-05 | End: 2018-12-05

## 2018-12-05 RX ORDER — ASPIRIN 81 MG/1
81 TABLET, CHEWABLE ORAL DAILY
Status: DISCONTINUED | OUTPATIENT
Start: 2018-12-05 | End: 2018-12-11 | Stop reason: HOSPADM

## 2018-12-05 RX ORDER — FERROUS SULFATE 325(65) MG
325 TABLET ORAL 2 TIMES DAILY WITH MEALS
Status: DISCONTINUED | OUTPATIENT
Start: 2018-12-06 | End: 2018-12-11 | Stop reason: HOSPADM

## 2018-12-05 RX ORDER — SERTRALINE HYDROCHLORIDE 25 MG/1
25 TABLET, FILM COATED ORAL DAILY
COMMUNITY
End: 2019-03-20 | Stop reason: HOSPADM

## 2018-12-05 RX ORDER — AMPICILLIN 500 MG/1
500 CAPSULE ORAL 4 TIMES DAILY
COMMUNITY
Start: 2018-11-29 | End: 2018-12-11 | Stop reason: HOSPADM

## 2018-12-05 RX ORDER — SODIUM CHLORIDE 9 MG/ML
50 INJECTION, SOLUTION INTRAVENOUS CONTINUOUS
Status: DISCONTINUED | OUTPATIENT
Start: 2018-12-05 | End: 2018-12-07

## 2018-12-05 RX ORDER — HEPARIN SODIUM 5000 [USP'U]/ML
5000 INJECTION, SOLUTION INTRAVENOUS; SUBCUTANEOUS EVERY 8 HOURS SCHEDULED
Status: DISCONTINUED | OUTPATIENT
Start: 2018-12-05 | End: 2018-12-11 | Stop reason: HOSPADM

## 2018-12-05 RX ORDER — ATORVASTATIN CALCIUM 40 MG/1
40 TABLET, FILM COATED ORAL EVERY EVENING
Status: DISCONTINUED | OUTPATIENT
Start: 2018-12-05 | End: 2018-12-11 | Stop reason: HOSPADM

## 2018-12-05 RX ORDER — ONDANSETRON 2 MG/ML
4 INJECTION INTRAMUSCULAR; INTRAVENOUS EVERY 4 HOURS PRN
Status: DISCONTINUED | OUTPATIENT
Start: 2018-12-05 | End: 2018-12-11 | Stop reason: HOSPADM

## 2018-12-05 RX ADMIN — CEFTRIAXONE 1000 MG: 1 INJECTION, SOLUTION INTRAVENOUS at 18:36

## 2018-12-05 RX ADMIN — HEPARIN SODIUM 5000 UNITS: 5000 INJECTION, SOLUTION INTRAVENOUS; SUBCUTANEOUS at 21:16

## 2018-12-05 RX ADMIN — SODIUM CHLORIDE 50 ML/HR: 0.9 INJECTION, SOLUTION INTRAVENOUS at 20:00

## 2018-12-05 NOTE — ASSESSMENT & PLAN NOTE
· Baseline creatinine of 1 1-1 3  · The patient is currently at her baseline CKD stage 4  · Avoid all nephrotoxic agents  · Serial laboratory testing to monitor her renal function and electrolytes

## 2018-12-05 NOTE — ED PROVIDER NOTES
History  Chief Complaint   Patient presents with    Altered Mental Status     Patient began this morning with AMS, complained of double vision, headache, garbled speech,     80 yr female BIBA from [facility] for eval of altered mental status  I personally took phone report from Dior Townsend the RN @ [facility] per her report: "pt coming with change in mental status, right facial droop, c/o double vision, and  Yesterday had twitching episodes  SNF staff nor patient can provide time of onset of symptoms specifically but they believe this is new sx just today and the twitching was yesterday  SNF doctor was in to see patient and wanted to order a head CT and have patient sent to the ED for eval  Pt is DNR/DNI  She took her AM meds, had breakfast  No allergies and no recent falls  Normally alert and oriented today sleepier than usual and  strength is diminished but bilateral "    Patient awake alert, seems to be having speech difficulty during my exam at times slurred and at times clear  Pt tells me her hands are numb for about a month and she's having trouble with her words but also tells me for about a month  She is right hand dominant  She is moving all 4 extremities equally  She c/o a headache to triage nurse now tells me it's her neck and asks me to adjust her pillow  She wears nasal o2 at baseline and is wearing same now  She denies history of stroke  She does not walk well at baseline, usually wheelchair  Due to unknown time of onset of symptoms patient is not a tpa candidate  Pt will have stroke pathway workup and I will consult neurology following results  Pt is hemodynamically stable at present time with waxing and waning speech deficit without sensory/strength deficit  Patient is DNR/DNI per snf records  History provided by:  Patient, EMS personnel and nursing home      Prior to Admission Medications   Prescriptions Last Dose Informant Patient Reported? Taking?    Multiple Vitamin (MULTIVITAMIN) tablet   Yes No   Sig: Take 1 tablet by mouth daily   acetaminophen (TYLENOL) 325 mg tablet   Yes No   Sig: Take 650 mg by mouth every 6 (six) hours as needed for mild pain   bisacodyl (DULCOLAX) 10 mg suppository   Yes No   Sig: Insert 10 mg into the rectum daily   collagenase (SANTYL) ointment   Yes No   Sig: Apply topically 3 (three) times a day   ferrous sulfate 325 (65 Fe) mg tablet   Yes No   Sig: Take 325 mg by mouth 2 (two) times a day with meals   levothyroxine 75 mcg tablet   Yes No   Sig: Take 88 mcg by mouth daily     loratadine (CLARITIN) 10 mg tablet   Yes No   Sig: Take 10 mg by mouth daily   magnesium hydroxide (MILK OF MAGNESIA) 400 mg/5 mL oral suspension   Yes No   Sig: Take by mouth daily as needed for constipation   sodium chloride (OCEAN) 0 65 % nasal spray   Yes No   Si spray into each nostril 4 (four) times a day as needed for congestion   tolterodine (DETROL LA) 4 mg 24 hr capsule   Yes No   Sig: Take 4 mg by mouth daily      Facility-Administered Medications: None       Past Medical History:   Diagnosis Date    Anemia     Arthritis     Cancer (HCC)     breast    Disease of thyroid gland     Hyperlipidemia     IBS (irritable bowel syndrome)     Overactive bladder     Peripheral neuropathy     Polio     Poliomyelitis     Renal disorder     Rhabdomyolysis     UTI (urinary tract infection)     Ventral hernia        Past Surgical History:   Procedure Laterality Date    APPENDECTOMY      BREAST SURGERY      right mastectomy       History reviewed  No pertinent family history  I have reviewed and agree with the history as documented  Social History   Substance Use Topics    Smoking status: Never Smoker    Smokeless tobacco: Never Used    Alcohol use No        Review of Systems   Constitutional: Negative for chills and fever  HENT: Negative for congestion and sore throat  Eyes: Negative for pain and visual disturbance     Respiratory: Negative for cough and shortness of breath  Cardiovascular: Negative for chest pain and leg swelling  Gastrointestinal: Negative for abdominal pain, diarrhea and vomiting  Genitourinary: Negative for decreased urine volume and difficulty urinating  Musculoskeletal: Negative for back pain and gait problem  Skin: Negative for rash and wound  Allergic/Immunologic: Negative for immunocompromised state  Neurological: Negative for dizziness and headaches  Physical Exam  Physical Exam   Constitutional: She appears well-developed and well-nourished  No distress  HENT:   Head: Normocephalic and atraumatic  Mouth/Throat: Oropharynx is clear and moist    Eyes: Pupils are equal, round, and reactive to light  Cardiovascular: Normal rate, regular rhythm and normal heart sounds  Pulmonary/Chest: Effort normal and breath sounds normal  No respiratory distress  She exhibits no tenderness  Neurological: She is alert  Skin: Skin is warm and dry  Capillary refill takes less than 2 seconds  She is not diaphoretic  Psychiatric: She has a normal mood and affect  Nursing note and vitals reviewed  Vital Signs  ED Triage Vitals [12/05/18 1134]   Temp Pulse Respirations Blood Pressure SpO2   -- 60 14 138/65 97 %      Temp src Heart Rate Source Patient Position - Orthostatic VS BP Location FiO2 (%)   -- Monitor Sitting Left arm --      Pain Score       8           Vitals:    12/05/18 1134   BP: 138/65   Pulse: 60   Patient Position - Orthostatic VS: Sitting       Visual Acuity  Visual Acuity      Most Recent Value   L Pupil Size (mm)  3   R Pupil Size (mm)  3          ED Medications  Medications - No data to display    Diagnostic Studies  Results Reviewed     Procedure Component Value Units Date/Time    Sturdivant draw [[id number]] Collected:  12/05/18 1131    Lab Status:  Final result Specimen:  Blood Updated:  12/05/18 1304    Narrative: The following orders were created for panel order Sturdivant draw    Procedure Abnormality         Status                     ---------                               -----------         ------                     [place] Payment Top on TQTW[[id number]]                            Final result               Gold top on GVAK[[id number]]                                  Final result               Green / Yellow tube on DAJH[[id number]]                       Final result               Green / Black tube on UBRK[[id number]]                        Final result               Lavender Top 3 ml on GXWA[[id number]]                         Final result               Lavender Top 7ml on RIKL[[id number]]                          Final result                 Please view results for these tests on the individual orders  Comprehensive metabolic panel [[id number]]  (Abnormal) Collected:  12/05/18 1131    Lab Status:  Final result Specimen:  Blood from Arm, Left Updated:  12/05/18 1238     Sodium 142 mmol/L      Potassium 4 6 mmol/L      Chloride 106 mmol/L      CO2 33 (H) mmol/L      ANION GAP 3 (L) mmol/L      BUN 39 (H) mg/dL      Creatinine 1 24 mg/dL      Glucose 78 mg/dL      Calcium 8 6 mg/dL      AST 17 U/L      ALT 26 U/L      Alkaline Phosphatase 92 U/L      Total Protein 7 2 g/dL      Albumin 2 8 (L) g/dL      Total Bilirubin 0 20 mg/dL      eGFR 37 ml/min/1 73sq m     Narrative:         National Kidney Disease Education Program recommendations are as follows:  GFR calculation is accurate only with a steady state creatinine  Chronic Kidney disease less than 60 ml/min/1 73 sq  meters  Kidney failure less than 15 ml/min/1 73 sq  meters      UA w Reflex to Microscopic w Reflex to Culture [[id number]]     Lab Status:  No result Specimen:  Urine     Troponin I [[id number]]  (Normal) Collected:  12/05/18 1154    Lab Status:  Final result Specimen:  Blood Updated:  12/05/18 1216     Troponin I <0 02 ng/mL     Protime-INR [[id number]]  (Normal) Collected:  12/05/18 1131    Lab Status:  Final result Specimen:  Blood from Arm, Left Updated:  12/05/18 1200     Protime 12 5 seconds      INR 0 98    APTT [[id number]]  (Normal) Collected:  12/05/18 1131    Lab Status:  Final result Specimen:  Blood from Arm, Left Updated:  12/05/18 1200     PTT 36 seconds     CBC [[id number]]  (Abnormal) Collected:  12/05/18 1131    Lab Status:  Final result Specimen:  Blood from Arm, Left Updated:  12/05/18 1155     WBC 7 55 Thousand/uL      RBC 3 62 (L) Million/uL      Hemoglobin 11 3 (L) g/dL      Hematocrit 36 5 %       (H) fL      MCH 31 2 pg      MCHC 31 0 (L) g/dL      RDW 14 8 %      Platelets 386 Thousands/uL      MPV 9 7 fL                  CT head wo contrast   Final Result by ZUHAIR Brenner MD (12/05 1333)      No acute intracranial abnormality  Air-fluid level left sphenoid sinus which may, in the appropriate clinical setting, represent acute sinusitis  Additional paranasal sinus disease as above  Workstation performed: [id number]         X-ray chest 1 view portable   Final Result by ZUHAIR Brenner MD (12/05 1326)      No acute cardiopulmonary disease              Workstation performed: [id number]                    Procedures  ECG 12 Lead Documentation  Date/Time: 12/5/2018 12:04 PM  Performed by: Reshma Panda  Authorized by: Reshma Panda     Indications / Diagnosis:  Ams  ECG reviewed by me, the ED Provider: yes    Patient location:  ED  Rate:     ECG rate:  62  Rhythm:     Rhythm: sinus rhythm    Ectopy:     Ectopy: none    QRS:     QRS axis:  Normal  Conduction:     Conduction: abnormal      Abnormal conduction: complete RBBB    ST segments:     ST segments:  Normal  T waves:     T waves: normal             Phone Contacts  ED Phone Contact    ED Course  ED Course as of Dec 05 1510   Wed Dec 05, 2018   1203 WBC: 7 55   1203 Hemoglobin: (!) 11 3   1203 HCT: 36 5   1204 Platelet Count: 251   1221 INR: 0 98   1221 Protime: 12 5   1221 Troponin I: <0 02   1221 PTT: 36   1308 Spoke with arin CT tech, patient's GFR 37 cannot perform contrast CTA  Will take patient now for noncontrast 14 [facility]     1509 Pt sleeping with stridorous snoring  Pulse ox 100% on 2L  Pt easily arousable I asked her if she was just resting and told her she was snoring and she says "yes I do that all the time"          MDM  Number of Diagnoses or Management Options  Altered mental status: new and requires workup  Speech abnormality: new and requires workup     Amount and/or Complexity of Data Reviewed  Clinical lab tests: ordered and reviewed  Tests in the radiology section of CPT®: ordered and reviewed  Review and summarize past medical records: yes  Discuss the patient with other providers: yes  Independent visualization of images, tracings, or specimens: yes    Risk of Complications, Morbidity, and/or Mortality  Presenting problems: high  Diagnostic procedures: high  Management options: high    Patient Progress  Patient progress: stable    CritCare Time    Disposition  Final diagnoses: Altered mental status   Speech abnormality     Time reflects when diagnosis was documented in both MDM as applicable and the Disposition within this note     Time User Action Codes Description Comment    12/5/2018  2:12 PM Denise Looney Add [R41 82] Altered mental status     12/5/2018  2:12 PM Denise Looney Add [R47 9] Speech abnormality       ED Disposition     ED Disposition Condition Comment    Admit  Case was discussed with KWABENA and the patient's admission status was agreed to be Admission Status: inpatient status to the service of Dr Violetta Cuellar   Follow-up Information    None         Patient's Medications   Discharge Prescriptions    No medications on file     No discharge procedures on file      ED Provider  Electronically Signed by           Emerson Morton PA-C  12/05/18 6218

## 2018-12-05 NOTE — ED NOTES
Radha Hernandez, Patient's POA called and asked about the condition of her   If any questions or concerns please call cell: [phone], or home: ([address] [place] [address], RN  12/05/18 5225

## 2018-12-05 NOTE — ASSESSMENT & PLAN NOTE
· Initiate the stroke pathway  · Check an MRI of the brain without contrast and MRA of the head without contrast  · Check a bilateral carotid duplex  · Aspirin 81 mg PO Qdaily  · Initiate high-intensity statin therapy with Atorvastatin 40 mg PO daily  · Check an echocardiogram  · PT/OT

## 2018-12-05 NOTE — ED PROVIDER NOTES
History  Chief Complaint   Patient presents with    Altered Mental Status     Patient began this morning with AMS, complained of double vision, headache, garbled speech,     80 yr female BIBA from [facility] for eval of altered mental status  I personally took phone report from Yisel Knowles the RN @ [place] per her report: "pt coming with change in mental status, right facial droop, c/o double vision, and  Yesterday had twitching episodes  SNF staff nor patient can provide time of onset of symptoms specifically but they believe this is new sx just today and the twitching was yesterday  SNF doctor was in to see patient and wanted to order a head CT and have patient sent to the ED for eval  Pt is DNR/DNI  She took her AM meds, had breakfast  No allergies and no recent falls  Normally alert and oriented today sleepier than usual and  strength is diminished but bilateral "     Patient awake alert, seems to be having speech difficulty during my exam at times slurred and at times clear  Pt tells me her hands are numb for about a month and she's having trouble with her words but also tells me for about a month  She is right hand dominant  She is moving all 4 extremities equally  She c/o a headache to triage nurse now tells me it's her neck and asks me to adjust her pillow  She wears nasal o2 at baseline and is wearing same now  She denies history of stroke  She does not walk well at baseline, usually wheelchair      Due to unknown time of onset of symptoms patient is not a tpa candidate  Pt will have stroke pathway workup and I will consult neurology following results  Pt is hemodynamically stable at present time with waxing and waning speech deficit without sensory/strength deficit      Patient is DNR/DNI per snf records          History provided by:  Patient, EMS personnel and nursing home  Altered Mental Status   Associated symptoms: headaches and weakness    Associated symptoms: no abdominal pain, no fever, no light-headedness, no rash, no seizures and no vomiting        Prior to Admission Medications   Prescriptions Last Dose Informant Patient Reported? Taking? Multiple Vitamin (MULTIVITAMIN) tablet   Yes No   Sig: Take 1 tablet by mouth daily   acetaminophen (TYLENOL) 325 mg tablet   Yes No   Sig: Take 650 mg by mouth every 6 (six) hours as needed for mild pain   bisacodyl (DULCOLAX) 10 mg suppository   Yes No   Sig: Insert 10 mg into the rectum daily   collagenase (SANTYL) ointment   Yes No   Sig: Apply topically 3 (three) times a day   ferrous sulfate 325 (65 Fe) mg tablet   Yes No   Sig: Take 325 mg by mouth 2 (two) times a day with meals   levothyroxine 75 mcg tablet   Yes No   Sig: Take 88 mcg by mouth daily     loratadine (CLARITIN) 10 mg tablet   Yes No   Sig: Take 10 mg by mouth daily   magnesium hydroxide (MILK OF MAGNESIA) 400 mg/5 mL oral suspension   Yes No   Sig: Take by mouth daily as needed for constipation   sodium chloride (OCEAN) 0 65 % nasal spray   Yes No   Si spray into each nostril 4 (four) times a day as needed for congestion   tolterodine (DETROL LA) 4 mg 24 hr capsule   Yes No   Sig: Take 4 mg by mouth daily      Facility-Administered Medications: None       Past Medical History:   Diagnosis Date    Anemia     Arthritis     Cancer (HCC)     breast    Disease of thyroid gland     Hyperlipidemia     IBS (irritable bowel syndrome)     Overactive bladder     Peripheral neuropathy     Polio     Poliomyelitis     Renal disorder     Rhabdomyolysis     UTI (urinary tract infection)     Ventral hernia        Past Surgical History:   Procedure Laterality Date    APPENDECTOMY      BREAST SURGERY      right mastectomy       History reviewed  No pertinent family history  I have reviewed and agree with the history as documented      Social History   Substance Use Topics    Smoking status: Never Smoker    Smokeless tobacco: Never Used    Alcohol use No        Review of Systems Constitutional: Negative for chills and fever  HENT: Negative for congestion and sore throat  Eyes: Positive for visual disturbance  Negative for pain  Respiratory: Negative for cough and shortness of breath  Cardiovascular: Negative for chest pain and leg swelling  Gastrointestinal: Negative for abdominal pain, diarrhea and vomiting  Genitourinary: Negative for decreased urine volume and difficulty urinating  Musculoskeletal: Negative for back pain and gait problem  Skin: Negative for rash and wound  Allergic/Immunologic: Negative for immunocompromised state  Neurological: Positive for speech difficulty, weakness and headaches  Negative for dizziness, seizures, syncope, facial asymmetry, light-headedness and numbness  Physical Exam  Physical Exam   Constitutional: She is oriented to person, place, and time  She appears well-developed and well-nourished  No distress  HENT:   Head: Normocephalic and atraumatic  Mouth/Throat: Oropharynx is clear and moist    Poorly fitting dentures   Eyes: Pupils are equal, round, and reactive to light  Conjunctivae and EOM are normal    Neck: Neck supple  Cardiovascular: Normal rate, regular rhythm, normal heart sounds and intact distal pulses  Pulmonary/Chest: Effort normal and breath sounds normal  No respiratory distress  She exhibits no tenderness  Decreased breath sounds b/l  Nasal canula  Right mastectomy   Abdominal: Soft  Bowel sounds are normal    Neurological: She is alert and oriented to person, place, and time  No cranial nerve deficit or sensory deficit  Aaox4  Mental status normal and appropriate  Cognition and memory intact  CN 2-12 intact  Strength 5/5 all 4 extremities  Follows all commands, normal heel-down-shin and finger-to-nose coordination  Sensation intact to light touch face and 4 extremities   Speech garbled at least half of sentences however poorly fitting lower dentures may be at least partially responsible for this as well  Skin: Skin is warm and dry  Capillary refill takes less than 2 seconds  No rash noted  She is not diaphoretic  No erythema  No pallor  Psychiatric: She has a normal mood and affect  Nursing note and vitals reviewed  Vital Signs  ED Triage Vitals [12/05/18 1134]   Temp Pulse Respirations Blood Pressure SpO2   -- 60 14 138/65 97 %      Temp src Heart Rate Source Patient Position - Orthostatic VS BP Location FiO2 (%)   -- Monitor Sitting Left arm --      Pain Score       8           Vitals:    12/05/18 1134 12/05/18 1633 12/05/18 1736   BP: 138/65 123/60 123/60   Pulse: 60 68 65   Patient Position - Orthostatic VS: Sitting Sitting Sitting       Visual Acuity  Visual Acuity      Most Recent Value   L Pupil Size (mm)  3   R Pupil Size (mm)  3          ED Medications  Medications   cefTRIAXone (ROCEPHIN) IVPB (premix) 1,000 mg (1,000 mg Intravenous New Bag 12/5/18 1836)   cefTRIAXone (ROCEPHIN) IVPB (premix) 1,000 mg (not administered)   sodium chloride 0 9 % infusion (not administered)   acetaminophen (TYLENOL) tablet 650 mg (not administered)   ondansetron (ZOFRAN) injection 4 mg (not administered)       Diagnostic Studies  Results Reviewed     Procedure Component Value Units Date/Time    Urine Microscopic [[id number]]  (Abnormal) Collected:  12/05/18 1715    Lab Status:  Final result Specimen:  Urine from Urine, Clean Catch Updated:  12/05/18 1727     RBC, UA 4-10 (A) /hpf      WBC, UA Innumerable (A) /hpf      Epithelial Cells Innumerable (A) /hpf      Bacteria, UA Innumerable (A) /hpf     Urine culture [[id number]] Collected:  12/05/18 1715    Lab Status:   In process Specimen:  Urine from Urine, Clean Catch Updated:  12/05/18 1727    UA w Reflex to Microscopic w Reflex to Culture [[id number]]  (Abnormal) Collected:  12/05/18 1715    Lab Status:  Final result Specimen:  Urine from Urine, Clean Catch Updated:  12/05/18 1724     Color, UA Yellow     Clarity, UA Slightly Cloudy     Specific Gravity, UA 1 020     pH, UA 6 0     Leukocytes, UA Large (A)     Nitrite, UA Positive (A)     Protein, UA 30 (1+) (A) mg/dl      Glucose, UA Negative mg/dl      Ketones, UA Negative mg/dl      Urobilinogen, UA 0 2 E U /dl      Bilirubin, UA Negative     Blood, UA Small (A)    [place] draw [[id number]] Collected:  12/05/18 1131    Lab Status:  Final result Specimen:  Blood Updated:  12/05/18 1304    Narrative: The following orders were created for panel order [place] draw  Procedure                               Abnormality         Status                     ---------                               -----------         ------                     Rosslyn Doron Top on MFMU[[id number]]                            Final result               Gold top on WHSX[[id number]]                                  Final result               Green / Yellow tube on CSQV[[id number]]                       Final result               Green / Black tube on UWIL[[id number]]                        Final result               Lavender Top 3 ml on DEJW[[id number]]                         Final result               Lavender Top 7ml on GFZF[[id number]]                          Final result                 Please view results for these tests on the individual orders      Comprehensive metabolic panel [[id number]]  (Abnormal) Collected:  12/05/18 1131    Lab Status:  Final result Specimen:  Blood from Arm, Left Updated:  12/05/18 1238     Sodium 142 mmol/L      Potassium 4 6 mmol/L      Chloride 106 mmol/L      CO2 33 (H) mmol/L      ANION GAP 3 (L) mmol/L      BUN 39 (H) mg/dL      Creatinine 1 24 mg/dL      Glucose 78 mg/dL      Calcium 8 6 mg/dL      AST 17 U/L      ALT 26 U/L      Alkaline Phosphatase 92 U/L      Total Protein 7 2 g/dL      Albumin 2 8 (L) g/dL      Total Bilirubin 0 20 mg/dL      eGFR 37 ml/min/1 73sq m     Narrative:         National Kidney Disease Education Program recommendations are as follows:  GFR calculation is accurate only with a steady state creatinine  Chronic Kidney disease less than 60 ml/min/1 73 sq  meters  Kidney failure less than 15 ml/min/1 73 sq  meters  Troponin I [[id number]]  (Normal) Collected:  12/05/18 1154    Lab Status:  Final result Specimen:  Blood Updated:  12/05/18 1216     Troponin I <0 02 ng/mL     Protime-INR [[id number]]  (Normal) Collected:  12/05/18 1131    Lab Status:  Final result Specimen:  Blood from Arm, Left Updated:  12/05/18 1200     Protime 12 5 seconds      INR 0 98    APTT [[id number]]  (Normal) Collected:  12/05/18 1131    Lab Status:  Final result Specimen:  Blood from Arm, Left Updated:  12/05/18 1200     PTT 36 seconds     CBC [[id number]]  (Abnormal) Collected:  12/05/18 1131    Lab Status:  Final result Specimen:  Blood from Arm, Left Updated:  12/05/18 1155     WBC 7 55 Thousand/uL      RBC 3 62 (L) Million/uL      Hemoglobin 11 3 (L) g/dL      Hematocrit 36 5 %       (H) fL      MCH 31 2 pg      MCHC 31 0 (L) g/dL      RDW 14 8 %      Platelets 798 Thousands/uL      MPV 9 7 fL                  CT head wo contrast   Final Result by ZUHAIR Culver MD (12/05 1333)      No acute intracranial abnormality  Air-fluid level left sphenoid sinus which may, in the appropriate clinical setting, represent acute sinusitis  Additional paranasal sinus disease as above  Workstation performed: [id number]         X-ray chest 1 view portable   Final Result by ZUHAIR Culver MD (12/05 1326)      No acute cardiopulmonary disease              Workstation performed: [id number]                    Procedures  ECG 12 Lead Documentation  Date/Time: 12/5/2018 12:04 PM  Performed by: Jessie Hill  Authorized by: Jessie Hill     Indications / Diagnosis:  Ams  ECG reviewed by me, the ED Provider: yes    Patient location:  ED  Previous ECG:     Previous ECG:  Compared to current    Comparison ECG info:  October 2018    Similarity:  No change  Interpretation:     Interpretation: abnormal Rate:     ECG rate:  62  Rhythm:     Rhythm: sinus rhythm    Ectopy:     Ectopy: none    QRS:     QRS axis:  Normal  Conduction:     Conduction: abnormal      Abnormal conduction: complete RBBB    ST segments:     ST segments:  Normal  T waves:     T waves: normal             Phone Contacts  ED Phone Contact    ED Course  ED Course as of Dec 05 1903   Wed Dec 05, 2018   1203 WBC: 7 55   1203 Hemoglobin: (!) 11 3   1203 HCT: 36 5   1204 Platelet Count: 417   1221 INR: 0 98   1221 Protime: 12 5   1221 Troponin I: <0 02   1221 PTT: 36   1308 Spoke with [facility] CT tech, patient's GFR 37 cannot perform contrast CTA  Will take patient now for noncontrast 14 [facility]     6424   Pt accepted to [facility] @ 5128    Pt sleeping with stridorous snoring  Pulse ox 100% on 2L  Pt easily arousable I asked her if she was just resting and told her she was snoring and she says "yes I do that all the time"    1746 Color, UA: Yellow   1746 Leukocytes, UA: (!) Large   1746 Nitrite, UA: (!) Positive   1746 Blood, UA: (!) Small   1747 UA+ will start rocephin, culture in process   Inpatient can de-escalate to PO abx over the course of hospitalization          HEART Risk Score      Most Recent Value   History  0 Filed at: 12/05/2018 1843   ECG  1 Filed at: 12/05/2018 1843   Age  2 Filed at: 12/05/2018 1843   Risk Factors  1 Filed at: 12/05/2018 1843   Troponin  0 Filed at: 12/05/2018 1843   Heart Score Risk Calculator   History  0 Filed at: 12/05/2018 1843   ECG  1 Filed at: 12/05/2018 1843   Age  2 Filed at: 12/05/2018 1843   Risk Factors  1 Filed at: 12/05/2018 1843   Troponin  0 Filed at: 12/05/2018 1843   HEART Score  4 Filed at: 12/05/2018 1843   HEART Score  4 Filed at: 12/05/2018 1843            MDM  Number of Diagnoses or Management Options  Altered mental status: new and requires workup  Speech abnormality: new and requires workup     Amount and/or Complexity of Data Reviewed  Clinical lab tests: ordered and reviewed  Tests in the radiology section of CPT®: ordered and reviewed  Obtain history from someone other than the patient: yes ([facility]  Review and summarize past medical records: yes (Ams/encephalopathy in setting of respiratory illness 2 months ago  Negative ct/mri head  Baseline GFR 35-39)  Discuss the patient with other providers: yes  Independent visualization of images, tracings, or specimens: yes    Risk of Complications, Morbidity, and/or Mortality  Presenting problems: high  Diagnostic procedures: high  Management options: high    Patient Progress  Patient progress: stable    CritCare Time    Disposition  Final diagnoses: Altered mental status   Speech abnormality     Time reflects when diagnosis was documented in both MDM as applicable and the Disposition within this note     Time User Action Codes Description Comment    12/5/2018  2:12 PM Juan Fernando Add [R41 [id number]] Altered mental status     12/5/2018  2:12 PM Juan Fernando Add [R47 9] Speech abnormality     12/5/2018  6:51 PM ChocoruaMoises Add [R29 [id number]] Facial droop       ED Disposition     ED Disposition Condition Comment    Admit  Case was discussed with KWABENA and the patient's admission status was agreed to be Admission Status: inpatient status to the service of Dr Anthony Dias   Follow-up Information    None         Patient's Medications   Discharge Prescriptions    No medications on file     No discharge procedures on file      ED Provider  Electronically Signed by           Jo Malone PA-C  12/05/18 6960

## 2018-12-05 NOTE — ASSESSMENT & PLAN NOTE
· Check a vitamin B12 level and folate level  · Follow the CBC  · Transfuse for hemoglobin less than 7

## 2018-12-06 ENCOUNTER — APPOINTMENT (INPATIENT)
Dept: NON INVASIVE DIAGNOSTICS | Facility: HOSPITAL | Age: 83
DRG: 689 | End: 2018-12-06
Payer: COMMERCIAL

## 2018-12-06 ENCOUNTER — APPOINTMENT (INPATIENT)
Dept: MRI IMAGING | Facility: HOSPITAL | Age: 83
DRG: 689 | End: 2018-12-06
Payer: COMMERCIAL

## 2018-12-06 PROBLEM — R79.89 ELEVATED TSH: Status: ACTIVE | Noted: 2018-12-06

## 2018-12-06 LAB
ALBUMIN SERPL BCP-MCNC: 2.3 G/DL (ref 3.5–5)
ALP SERPL-CCNC: 71 U/L (ref 46–116)
ALT SERPL W P-5'-P-CCNC: 19 U/L (ref 12–78)
ANION GAP SERPL CALCULATED.3IONS-SCNC: 4 MMOL/L (ref 4–13)
AST SERPL W P-5'-P-CCNC: 16 U/L (ref 5–45)
BASOPHILS # BLD AUTO: 0.02 THOUSANDS/ΜL (ref 0–0.1)
BASOPHILS NFR BLD AUTO: 0 % (ref 0–1)
BILIRUB SERPL-MCNC: 0.1 MG/DL (ref 0.2–1)
BUN SERPL-MCNC: 37 MG/DL (ref 5–25)
CALCIUM SERPL-MCNC: 7.8 MG/DL (ref 8.3–10.1)
CHLORIDE SERPL-SCNC: 110 MMOL/L (ref 100–108)
CHOLEST SERPL-MCNC: 159 MG/DL (ref 50–200)
CO2 SERPL-SCNC: 30 MMOL/L (ref 21–32)
CREAT SERPL-MCNC: 1.21 MG/DL (ref 0.6–1.3)
EOSINOPHIL # BLD AUTO: 0.3 THOUSAND/ΜL (ref 0–0.61)
EOSINOPHIL NFR BLD AUTO: 5 % (ref 0–6)
ERYTHROCYTE [DISTWIDTH] IN BLOOD BY AUTOMATED COUNT: 14.7 % (ref 11.6–15.1)
EST. AVERAGE GLUCOSE BLD GHB EST-MCNC: 103 MG/DL
FOLATE SERPL-MCNC: >20 NG/ML (ref 3.1–17.5)
GFR SERPL CREATININE-BSD FRML MDRD: 38 ML/MIN/1.73SQ M
GLUCOSE SERPL-MCNC: 57 MG/DL (ref 65–140)
HBA1C MFR BLD: 5.2 % (ref 4.2–6.3)
HCT VFR BLD AUTO: 32.3 % (ref 34.8–46.1)
HDLC SERPL-MCNC: 63 MG/DL (ref 40–60)
HGB BLD-MCNC: 9.9 G/DL (ref 11.5–15.4)
IMM GRANULOCYTES # BLD AUTO: 0.04 THOUSAND/UL (ref 0–0.2)
IMM GRANULOCYTES NFR BLD AUTO: 1 % (ref 0–2)
LDLC SERPL CALC-MCNC: 83 MG/DL (ref 0–100)
LYMPHOCYTES # BLD AUTO: 1.26 THOUSANDS/ΜL (ref 0.6–4.47)
LYMPHOCYTES NFR BLD AUTO: 22 % (ref 14–44)
MAGNESIUM SERPL-MCNC: 2.3 MG/DL (ref 1.6–2.6)
MCH RBC QN AUTO: 31.1 PG (ref 26.8–34.3)
MCHC RBC AUTO-ENTMCNC: 30.7 G/DL (ref 31.4–37.4)
MCV RBC AUTO: 102 FL (ref 82–98)
MONOCYTES # BLD AUTO: 0.36 THOUSAND/ΜL (ref 0.17–1.22)
MONOCYTES NFR BLD AUTO: 6 % (ref 4–12)
NEUTROPHILS # BLD AUTO: 3.65 THOUSANDS/ΜL (ref 1.85–7.62)
NEUTS SEG NFR BLD AUTO: 66 % (ref 43–75)
NRBC BLD AUTO-RTO: 0 /100 WBCS
PHOSPHATE SERPL-MCNC: 3.1 MG/DL (ref 2.3–4.1)
PLATELET # BLD AUTO: 285 THOUSANDS/UL (ref 149–390)
PMV BLD AUTO: 10 FL (ref 8.9–12.7)
POTASSIUM SERPL-SCNC: 4.5 MMOL/L (ref 3.5–5.3)
PROT SERPL-MCNC: 5.8 G/DL (ref 6.4–8.2)
RBC # BLD AUTO: 3.18 MILLION/UL (ref 3.81–5.12)
SODIUM SERPL-SCNC: 144 MMOL/L (ref 136–145)
TRIGL SERPL-MCNC: 65 MG/DL
TSH SERPL DL<=0.05 MIU/L-ACNC: 5.79 UIU/ML (ref 0.36–3.74)
VIT B12 SERPL-MCNC: 1342 PG/ML (ref 100–900)
WBC # BLD AUTO: 5.63 THOUSAND/UL (ref 4.31–10.16)

## 2018-12-06 PROCEDURE — 82607 VITAMIN B-12: CPT | Performed by: INTERNAL MEDICINE

## 2018-12-06 PROCEDURE — 97163 PT EVAL HIGH COMPLEX 45 MIN: CPT | Performed by: PHYSICAL THERAPIST

## 2018-12-06 PROCEDURE — 93306 TTE W/DOPPLER COMPLETE: CPT | Performed by: INTERNAL MEDICINE

## 2018-12-06 PROCEDURE — 83036 HEMOGLOBIN GLYCOSYLATED A1C: CPT | Performed by: INTERNAL MEDICINE

## 2018-12-06 PROCEDURE — G8978 MOBILITY CURRENT STATUS: HCPCS | Performed by: PHYSICAL THERAPIST

## 2018-12-06 PROCEDURE — 80061 LIPID PANEL: CPT | Performed by: INTERNAL MEDICINE

## 2018-12-06 PROCEDURE — G8997 SWALLOW GOAL STATUS: HCPCS

## 2018-12-06 PROCEDURE — 85025 COMPLETE CBC W/AUTO DIFF WBC: CPT | Performed by: INTERNAL MEDICINE

## 2018-12-06 PROCEDURE — 84443 ASSAY THYROID STIM HORMONE: CPT | Performed by: INTERNAL MEDICINE

## 2018-12-06 PROCEDURE — 99232 SBSQ HOSP IP/OBS MODERATE 35: CPT | Performed by: INTERNAL MEDICINE

## 2018-12-06 PROCEDURE — 92610 EVALUATE SWALLOWING FUNCTION: CPT

## 2018-12-06 PROCEDURE — 70544 MR ANGIOGRAPHY HEAD W/O DYE: CPT

## 2018-12-06 PROCEDURE — 82746 ASSAY OF FOLIC ACID SERUM: CPT | Performed by: INTERNAL MEDICINE

## 2018-12-06 PROCEDURE — G8996 SWALLOW CURRENT STATUS: HCPCS

## 2018-12-06 PROCEDURE — 70551 MRI BRAIN STEM W/O DYE: CPT

## 2018-12-06 PROCEDURE — G8979 MOBILITY GOAL STATUS: HCPCS | Performed by: PHYSICAL THERAPIST

## 2018-12-06 PROCEDURE — 84100 ASSAY OF PHOSPHORUS: CPT | Performed by: INTERNAL MEDICINE

## 2018-12-06 PROCEDURE — 80053 COMPREHEN METABOLIC PANEL: CPT | Performed by: INTERNAL MEDICINE

## 2018-12-06 PROCEDURE — 93306 TTE W/DOPPLER COMPLETE: CPT

## 2018-12-06 PROCEDURE — 83735 ASSAY OF MAGNESIUM: CPT | Performed by: INTERNAL MEDICINE

## 2018-12-06 RX ORDER — LEVOTHYROXINE SODIUM 0.1 MG/1
100 TABLET ORAL
Status: DISCONTINUED | OUTPATIENT
Start: 2018-12-07 | End: 2018-12-11 | Stop reason: HOSPADM

## 2018-12-06 RX ADMIN — HEPARIN SODIUM 5000 UNITS: 5000 INJECTION, SOLUTION INTRAVENOUS; SUBCUTANEOUS at 21:22

## 2018-12-06 RX ADMIN — CEFTRIAXONE 1000 MG: 1 INJECTION, SOLUTION INTRAVENOUS at 18:42

## 2018-12-06 RX ADMIN — COLLAGENASE SANTYL 1 APPLICATION: 250 OINTMENT TOPICAL at 13:40

## 2018-12-06 RX ADMIN — SODIUM CHLORIDE 50 ML/HR: 0.9 INJECTION, SOLUTION INTRAVENOUS at 18:40

## 2018-12-06 RX ADMIN — HEPARIN SODIUM 5000 UNITS: 5000 INJECTION, SOLUTION INTRAVENOUS; SUBCUTANEOUS at 13:38

## 2018-12-06 RX ADMIN — HEPARIN SODIUM 5000 UNITS: 5000 INJECTION, SOLUTION INTRAVENOUS; SUBCUTANEOUS at 06:08

## 2018-12-06 NOTE — ASSESSMENT & PLAN NOTE
· Continue the stroke pathway  · Check an MRI of the brain without contrast and MRA of the head without contrast  · Check a bilateral carotid duplex  · Aspirin 81 mg PO Qdaily  · High-intensity statin therapy was initiated with Atorvastatin 40 mg PO daily  · Check an echocardiogram  · Continue telemetry monitoring to watch for any signs of atrial fibrillation/atrial flutter, which could cause an embolic CVA  · PT/OT

## 2018-12-06 NOTE — PLAN OF CARE
Activity Intolerance/Impaired Mobility     Mobility/activity is maintained at optimum level for patient Progressing        Communication Impairment     Ability to express needs and understand communication [address] Discharge to home or other facility with appropriate resources Progressing        INFECTION - ADULT     Absence or prevention of progression during hospitalization Progressing        Knowledge Deficit     Patient/family/caregiver demonstrates understanding of disease process, treatment plan, medications, and discharge instructions Progressing        Neurological Deficit     Neurological status is stable or improving Progressing        Nutrition     Nutrition/Hydration status is improving Progressing        Nutrition/Hydration-ADULT     Nutrient/Hydration intake appropriate for improving, restoring or maintaining nutritional needs Progressing        PAIN - ADULT     Verbalizes/displays adequate comfort level or baseline comfort level Progressing        Potential for Aspiration     Non-ventilated patient's risk of aspiration is minimized Progressing        Potential for Falls     Patient will remain free of falls Progressing        Prexisting or High Potential for Compromised Skin Integrity     Skin integrity is maintained or improved Progressing        SAFETY ADULT     Maintain or return to baseline ADL function Progressing     Maintain or return mobility status to optimal level Progressing     Patient will remain free of falls Progressing

## 2018-12-06 NOTE — ASSESSMENT & PLAN NOTE
· Also with expressive aphasia  · Possible secondary to acute cystitis  · Continue the stroke pathway  · Check an MRI of the brain without contrast and MRA of the head without contrast  · Check a bilateral carotid duplex  · Aspirin 81 mg PO Qdaily  · High-intensity statin therapy was initiated with Atorvastatin 40 mg PO daily  · Check an echocardiogram  · Continue telemetry monitoring to watch for any signs of atrial fibrillation/atrial flutter, which could cause an embolic CVA  · PT/OT

## 2018-12-06 NOTE — PLAN OF CARE
Problem: PHYSICAL THERAPY ADULT  Goal: Performs mobility at highest level of function for planned discharge setting  See evaluation for individualized goals  Outcome: Progressing  Prognosis: Good  Problem List: Decreased strength, Decreased range of motion, Decreased endurance, Impaired balance, Decreased mobility, Decreased cognition, Impaired judgement, Decreased safety awareness  Assessment: Pt is a 80year old female presenting to [facility] from [address] with confusion facial droop double vision and expressive aphasia  Pt with complex PMH including anemia poliomyelitis breast CA with R mastectomy peripheral neuropathy and ventral hernia repair  Pt seen for high complexity PT evaluation presenting with decreased cognition decreased command following with increased lethargic and decreased ROM strength balance safety awareness mobility transfers and ability to ambulate  Pt is requiring max(A)x1-2 for all bed mobility and transfers  Pt with intermittent tremoring affecting balance and mobility  Pt is in need of continued activity in PT to improve safety balance endurance strength mobility transfers and short distance ambulation or w/c mobility  Anticipate pt will return to SNF on discharge as she is a long term resident  Recommendation: Long-term skilled PT, Long-term skilled nursing home placement     PT - OK to Discharge: No (when medically stable for discharge)    See flowsheet documentation for full assessment

## 2018-12-06 NOTE — PROGRESS NOTES
Patient's temperature at shift change was 92 9 tympanic  Checked a rectal temperature and it was 90 3  Other vital signs stable  Patient does express she feels cold but, no other complaints  Kristi Gomez applied to patient, heat turned up, and extra blankets applied  Temperature rechecked at 0116 and it was 93 0 rectally  [place] notified of all  Will continue to closely monitor patient and maintain current warming measures

## 2018-12-06 NOTE — SPEECH THERAPY NOTE
Speech-Language Pathology Bedside Swallow Evaluation      Patient Name: Lara Hernandez    BVXUY'[id number] Date: 12/6/2018     Problem List  Patient Active Problem List   Diagnosis    Chronic respiratory failure with hypoxia (HCC)    Acquired hypothyroidism    Acute encephalopathy    Generalized weakness    Presbycusis    Slow transit constipation    Acute cystitis with hematuria    Metabolic encephalopathy    Facial droop    Visual disturbance    Macrocytic anemia    CKD (chronic kidney disease) stage 4, GFR 15-29 ml/min (HCC)    Right bundle branch block       Past Medical History  Past Medical History:   Diagnosis Date    Anemia     Arthritis     Cancer ([address] )     breast    Disease of thyroid gland     Hyperlipidemia     IBS (irritable bowel syndrome)     Overactive bladder     Peripheral neuropathy     Polio     Poliomyelitis     Renal disorder     Rhabdomyolysis     UTI (urinary tract infection)     Ventral hernia        Past Surgical History  Past Surgical History:   Procedure Laterality Date    APPENDECTOMY      BREAST SURGERY      right mastectomy       Summary   Pt presented with s/s suggestive of moderate-severe oral and suspected at least moderate pharyngeal dysphagia  Symptoms or concerns included decreased bolus acceptance, decreased bolus propulsion, suspected decreased control of liquid/puree bolus and anterior loss/spillage of thin liquid, as well as significant pharyngeal swallow delay, suspected decreased hyolaryngeal elevation upon palpation, and effortful/audible swallows  Pt required frequent verbal cues to maintain alertness throughout evaluation  She was noted to have strained vocal quality and stridor at baseline, however suspect this is not new as it was noted on prior ST evaluation (10/19/2918) when pt's cognitive status was more appropriate and she was tolerating regular diet at that time   Dentures are in poor condition and recommend leaving them out, as palate appears sore  Continue frequent oral care and no dentures  Risk for Aspiration: mod    Recommendations: NPO except medications, can give meds crushed in puree, sips of HTL via tsp OK when pt is alert  Recommended Form of Meds: crushed with puree     Aspiration precautions and compensatory swallowing strategies: upright posture, only feed when fully alert and minimal PO until reassessed by ST      Current Medical Status per Dr Anay Narayan H&P 12/5/2018  Georgina Seymour is a 80 y o  female who currently resides at [facility] and presented to the ED with confusion, a right-sided facial droop, double vision, and expressive aphasia  The patient's symptomatology commenced today, 12/05/2018, but the exact time of onset is unclear  Her symptoms were constant in nature, and nothing seemed to improve her symptoms  In addition, the patient complains of an intermittent headache  No chest pain  No shortness of breath  No abdominal pain  No nausea or vomiting  From prior admission ST assessment 10/19/2018: The patient does report h/o rodriguez throughout her life, and she states she is a consistent talker  Vocal quality noted to be strained  She is mouth breather w/stridor during conversation  The patient was without oral or pharyngeal dysphagia at bedside with lunch tray  Past medical history:  Please see H&P for details    Special Studies:  CT head 12/5/2018 IMPRESSION:  No acute intracranial abnormality  Air-fluid level left sphenoid sinus which may, in the appropriate clinical setting, represent acute sinusitis  Additional paranasal sinus disease as above  CXR 12/5/2018 IMPRESSION:  No acute cardiopulmonary disease      Social/Education/Vocational Hx:  Pt lives in SNF/ECF      Swallow Information   Current Risks for Dysphagia & Aspiration: r/o CVA, failed RN screen due to abnormal vocal quality     Current Symptoms/Concerns: Stridor/labored breathing    Current Diet: NPO      Baseline Diet: As of 10/18/18 scanned document from pt's facility indicates pt was on a regular texture diet with thin liquid      Baseline Assessment   Behavior/Cognition: lethargic and waxing and waning arousal level    Speech/Language Status: able to follow commands inconsistently, limited verbal output and able to answer few simple questions verbally/intelligibly (name)    Patient Positioning: upright in bed, leaning hard to right, partially corrected using pillow on R side    Pain Status/Interventions/Response to Interventions: No report of or nonverbal indications of pain  Swallow Mechanism Exam   Facial: symmetrical, although leaning to R side  Labial: decreased strength, liquid spillage from R side of mouth  Lingual: bilateral decreased strength, unable to fully test 2/2 limited command following  Velum: symmetrical, elevation not observed  Mandible:  decreased ROM  Dentition: edentulous, attempted to place dentures in but removed shortly after  Vocal quality:hoarse and strained, stridor with breathing  Volitional Cough: unable to initiate volitional cough     Consistencies Assessed and Performance   Consistencies Administered: thin liquids, honey thick and puree  Specific materials administered included apple juice, HTL water, applesauce    Oral Stage: moderate-severe   Initial trial of thin via tsp was immediately lost anteriorly, spilling from the R side of pt's mouth  There was no attempt to hold/transfer and swallow the bolus  Trials of HTL and puree taken with decreased retrieval from tsp  SLP pressed spoon to upper lip and retracted to empty bolus into oral cavity  Pt then demonstrated slow manipulation/transfer, suspect reduced bolus control with likely premature spillage over the base of the tongue  Pharyngeal Stage: at least moderate  Swallow Mechanics:  Swallowing initiation appeared significantly delayed  Suspect difficulty initiating swallow with several lingual "pumps" prior to initiation    Laryngeal rise was palpated and judged to be reduced  Swallows were audible and appearing effortful  No immediate coughing or throat clearing observed, however noted intermittent increase in stridor in respiratory status after swallow  Esophageal Concerns: belching, several minutes after few PO trials      Summary and Recommendations (see above)      Results Reviewed with: RN     Treatment Recommended: ST f/u for reassessment/potential to resume PO intake    Frequency of treatment: 3-5x    Dysphagia Goals per SLP: pt will tolerate the least restrictive diet with the least restrictive liquid consistency without s/s of aspiration x72hrs    Education: initiated  Pt and caregivers would benefit from/require continued education

## 2018-12-06 NOTE — SOCIAL WORK
Pt was admitted from [facility]  Pt is a bedhold and able to return on dc, but pt has [employer] and Cm will need to try and obtain authorization prior to pt returning

## 2018-12-06 NOTE — ASSESSMENT & PLAN NOTE
· Possibly secondary to acute cystitis  · Admit to med/surg level of care with telemetry monitoring to observe for any signs of atrial fibrillation/flutter, which could cause an embolic CVA  · Initiate the stroke pathway  · Check an MRI of the brain without contrast and MRA of the head without contrast  · Check a bilateral carotid duplex  · Aspirin 81 mg PO Qdaily  · Initiate high-intensity statin therapy with Atorvastatin 40 mg PO daily  · Check an echocardiogram  · PT/OT

## 2018-12-06 NOTE — H&P
H&P- Oral Comings 1/3/1924, 80 y o  female MRN: [id number]    Unit/Bed#: SORAYA Encounter: [id number]    Primary Care Provider: Kaye Henley DO   Date and time admitted to hospital: 12/5/2018 11:27 AM        * Acute encephalopathy   Assessment & Plan    · Possibly secondary to acute cystitis  · Admit to med/surg level of care with telemetry monitoring to observe for any signs of atrial fibrillation/flutter, which could cause an embolic CVA  · Initiate the stroke pathway  · Check an MRI of the brain without contrast and MRA of the head without contrast  · Check a bilateral carotid duplex  · Aspirin 81 mg PO Qdaily  · Initiate high-intensity statin therapy with Atorvastatin 40 mg PO daily  · Check an echocardiogram  · PT/OT       Visual disturbance   Assessment & Plan    · Initiate the stroke pathway  · Check an MRI of the brain without contrast and MRA of the head without contrast  · Check a bilateral carotid duplex  · Aspirin 81 mg PO Qdaily  · Initiate high-intensity statin therapy with Atorvastatin 40 mg PO daily  · Check an echocardiogram  · PT/OT     Facial droop   Assessment & Plan    · Initiate the stroke pathway  · Check an MRI of the brain without contrast and MRA of the head without contrast  · Check a bilateral carotid duplex  · Aspirin 81 mg PO Qdaily  · Initiate high-intensity statin therapy with Atorvastatin 40 mg PO daily  · Check an echocardiogram  · PT/OT     CKD (chronic kidney disease) stage 4, GFR 15-29 ml/min ([facility])   Assessment & Plan    · Baseline creatinine of 1 1-1 3  · The patient is currently at her baseline CKD stage 4  · Avoid all nephrotoxic agents  · Serial laboratory testing to monitor her renal function and electrolytes     Macrocytic anemia   Assessment & Plan    · Check a vitamin B12 level and folate level  · Follow the CBC  · Transfuse for hemoglobin less than 7     Acute cystitis with hematuria   Assessment & Plan    · IV ceftriaxone  · Await the urine culture results Acquired hypothyroidism   Assessment & Plan    · Check a TSH level  · Continue her home PO levothyroxine dose           VTE Prophylaxis: Heparin  / sequential compression device   Code Status:  Level 3-DNAR and DNI      Anticipated Length of Stay:  Patient will be admitted on an Inpatient basis with an anticipated length of stay of greater than 2 midnights  Justification for Hospital Stay:  The patient requires IV antibiotics, the stroke pathway, an MRI of the brain/MRA of the head, a bilateral carotid duplex, an echocardiogram, and close neurologic status monitoring  Total Time for Visit, including Counseling / Coordination of Care: 45 minutes  Greater than 50% of this total time spent on direct patient counseling and coordination of care  Chief Complaint:  Confusion, facial droop, and double vision    History of Present Illness: Omari Grigsby is a 80 y o  female who currently resides at [facility] and presented to the ED with confusion, a right-sided facial droop, double vision, and expressive aphasia  The patient's symptomatology commenced today, 12/05/2018, but the exact time of onset is unclear  Her symptoms were constant in nature, and nothing seemed to improve her symptoms  In addition, the patient complains of an intermittent headache  No chest pain  No shortness of breath  No abdominal pain  No nausea or vomiting  Review of Systems:    Review of Systems:  Per HPI, all other systems have been reviewed and were negative      Past Medical and Surgical History:     Past Medical History:   Diagnosis Date    Anemia     Arthritis     Cancer ([address] )     breast    Disease of thyroid gland     Hyperlipidemia     IBS (irritable bowel syndrome)     Overactive bladder     Peripheral neuropathy     Polio     Poliomyelitis     Renal disorder     Rhabdomyolysis     UTI (urinary tract infection)     Ventral hernia        Past Surgical History:   Procedure Laterality Date    APPENDECTOMY  BREAST SURGERY      right mastectomy       Meds/Allergies:    Prior to Admission medications    Medication Sig Start Date End Date Taking? Authorizing Provider   acetaminophen (TYLENOL) 325 mg tablet Take 650 mg by mouth every 6 (six) hours as needed for mild pain    Historical Provider, MD   bisacodyl (DULCOLAX) 10 mg suppository Insert 10 mg into the rectum daily    Historical Provider, MD   collagenase (SANTYL) ointment Apply topically 3 (three) times a day    Historical Provider, MD   ferrous sulfate 325 (65 Fe) mg tablet Take 325 mg by mouth 2 (two) times a day with meals    Historical Provider, MD   levothyroxine 75 mcg tablet Take 88 mcg by mouth daily      Historical Provider, MD   loratadine (CLARITIN) 10 mg tablet Take 10 mg by mouth daily    Historical Provider, MD   magnesium hydroxide (MILK OF MAGNESIA) 400 mg/5 mL oral suspension Take by mouth daily as needed for constipation    Historical Provider, MD   Multiple Vitamin (MULTIVITAMIN) tablet Take 1 tablet by mouth daily    Historical Provider, MD   sodium chloride (OCEAN) 0 65 % nasal spray 1 spray into each nostril 4 (four) times a day as needed for congestion    Historical Provider, MD   tolterodine (DETROL LA) 4 mg 24 hr capsule Take 4 mg by mouth daily    Historical Provider, MD     I have reviewed home medications with patient personally      Allergies: No Known Allergies    Social History:     Marital Status: Single     Substance Use History:   History   Alcohol Use No     History   Smoking Status    Never Smoker   Smokeless Tobacco    Never Used     History   Drug Use No       Family History:    non-contributory    Physical Exam:     Vitals:   Blood Pressure: 123/60 (12/05/18 1736)  Pulse: 65 (12/05/18 1736)  Respirations: 19 (12/05/18 1736)  Height: 5' 3" (160 cm) (12/05/18 1134)  Weight - Scale: 78 2 kg (172 lb 6 4 oz) (12/05/18 1134)  SpO2: 97 % (12/05/18 1736)    Physical Exam  General:  NAD, awake, confused at times, follows commands  HEENT:  NC/AT, mucous membranes moist  Neck:  Supple, No JVP elevation  CV:  + S1, + S2, RRR  Pulm:  Lung fields are CTA bilaterally  Abd:  Soft, Non-tender, Non-distended  Ext:  No clubbing/cyanosis/edema  Skin:  No rashes    Additional Data:     Lab Results: I have personally reviewed pertinent reports  Results from last 7 days  Lab Units 12/05/18  1131   WBC Thousand/uL 7 55   HEMOGLOBIN g/dL 11 3*   HEMATOCRIT % 36 5   PLATELETS Thousands/uL 310       Results from last 7 days  Lab Units 12/05/18  1131   SODIUM mmol/L 142   POTASSIUM mmol/L 4 6   CHLORIDE mmol/L 106   CO2 mmol/L 33*   BUN mg/dL 39*   CREATININE mg/dL 1 24   ANION GAP mmol/L 3*   CALCIUM mg/dL 8 6   ALBUMIN g/dL 2 8*   TOTAL BILIRUBIN mg/dL 0 20   ALK PHOS U/L 92   ALT U/L 26   AST U/L 17   GLUCOSE RANDOM mg/dL 78       Results from last 7 days  Lab Units 12/05/18  1131   INR  0 98                   Imaging: I have personally reviewed pertinent reports  CT head wo contrast   Final Result by ZUHAIR Mann MD (12/05 1333)      No acute intracranial abnormality  Air-fluid level left sphenoid sinus which may, in the appropriate clinical setting, represent acute sinusitis  Additional paranasal sinus disease as above  Workstation performed: [id number]         X-ray chest 1 view portable   Final Result by ZUHAIR Mann MD (12/05 1326)      No acute cardiopulmonary disease  Workstation performed: [id number]             EKG, Pathology, and Other Studies Reviewed on Admission:   · EKG (12/05/2018): Normal sinus rhythm with a heart rate of 62 bpm    Allscripts / Epic Records Reviewed: Yes     ** Please Note: This note has been constructed using a voice recognition system   **

## 2018-12-06 NOTE — PROGRESS NOTES
Progress Note - Abner Rivera 1/3/1924, 80 y o  female MRN: [id number]    Unit/Bed#: 911-28 Encounter: [id number]    Primary Care Provider: Kostas Garg DO   Date and time admitted to hospital: 12/5/2018 11:27 AM        * Acute encephalopathy   Assessment & Plan    · Also with expressive aphasia  · Possibly secondary to acute cystitis  · Continue the stroke pathway  · Check an MRI of the brain without contrast and MRA of the head without contrast  · Check a bilateral carotid duplex  · Aspirin 81 mg PO Qdaily  · High-intensity statin therapy was initiated with Atorvastatin 40 mg PO daily  · Check an echocardiogram  · Continue telemetry monitoring to watch for any signs of atrial fibrillation/atrial flutter, which could cause an embolic CVA  · PT/OT       Visual disturbance   Assessment & Plan    · Continue the stroke pathway  · Check an MRI of the brain without contrast and MRA of the head without contrast  · Check a bilateral carotid duplex  · Aspirin 81 mg PO Qdaily  · High-intensity statin therapy was initiated with Atorvastatin 40 mg PO daily  · Check an echocardiogram  · Continue telemetry monitoring to watch for any signs of atrial fibrillation/atrial flutter, which could cause an embolic CVA  · PT/OT     Facial droop   Assessment & Plan    · Continue the stroke pathway  · Check an MRI of the brain without contrast and MRA of the head without contrast  · Check a bilateral carotid duplex  · Aspirin 81 mg PO Qdaily  · High-intensity statin therapy was initiated with Atorvastatin 40 mg PO daily  · Check an echocardiogram  · Continue telemetry monitoring to watch for any signs of atrial fibrillation/atrial flutter, which could cause an embolic CVA  · PT/OT     Right bundle branch block   Assessment & Plan    · Outpatient follow-up with Cardiology     CKD (chronic kidney disease) stage 4, GFR 15-29 ml/min ([facility])   Assessment & Plan    · Baseline creatinine of 1 1-1 3  · The patient is currently at her baseline CKD stage 4  · Avoid all nephrotoxic agents  · Serial laboratory testing to monitor her renal function and electrolytes     Macrocytic anemia   Assessment & Plan    · Check a vitamin B12 level and folate level  · Follow the CBC  · Transfuse for hemoglobin less than 7     Acute cystitis with hematuria   Assessment & Plan    · Continue IV ceftriaxone  · Await the urine culture results     Acquired hypothyroidism   Assessment & Plan    Results for Burt Suarez (MRN [id number]) as of 2018 10:23   Ref  Range 2018 05:02   TSH 3RD GENERATON Latest Ref Range: 0 358 - 3 740 uIU/mL 5 787 (H)     · Increase levothyroxine to 100 micrograms PO Qdaily in the early morning         VTE Pharmacologic Prophylaxis:   Pharmacologic: Heparin  Mechanical VTE Prophylaxis in Place: Yes    Patient Centered Rounds: I have performed bedside rounds with nursing staff today  Time Spent for Care: 20 minutes  More than 50% of total time spent on counseling and coordination of care as described above  Current Length of Stay: 1 day(s)    Current Patient Status: Inpatient   Certification Statement: The patient will continue to require additional inpatient hospital stay due to the need for IV antibiotics, the need for the stroke pathway, and the need for an MRI of the brain/MRA of head  Code Status: Level 3 - DNAR and DNI      Subjective: The patient was seen and examined  The patient is lethargic, confused, and is experiencing expressive aphasia today, 2018  Objective:     Vitals:   Temp (24hrs), Av 5 °F (35 3 °C), Min:90 3 °F (32 4 °C), Max:98 2 °F (36 8 °C)    Temp:  [90 3 °F (32 4 °C)-98 2 °F (36 8 °C)] 98 2 °F (36 8 °C)  HR:  [60-80] 80  Resp:  [14-22] 20  BP: (101-138)/(51-76) 101/51  SpO2:  [92 %-98 %] 92 %  Body mass index is 29 13 kg/m²       Input and Output Summary (last 24 hours):     No intake or output data in the 24 hours ending 18 1029    Physical Exam:     Physical Exam  General: Lethargic, confused, awakens to verbal stimuli  HEENT:  NC/AT, mucous membranes moist  Neck:  Supple, No JVP elevation  CV:  + S1, + S2, RRR  Pulm:  Lung fields are CTA bilaterally  Abd:  Soft, Non-tender, Non-distended  Ext:  No clubbing/cyanosis/edema  Neuro:  Right-sided facial droop, Expressive aphasia is present      Additional Data:    Labs:      Results from last 7 days  Lab Units 12/06/18  0502   WBC Thousand/uL 5 63   HEMOGLOBIN g/dL 9 9*   HEMATOCRIT % 32 3*   PLATELETS Thousands/uL 285   NEUTROS PCT % 66   LYMPHS PCT % 22   MONOS PCT % 6   EOS PCT % 5       Results from last 7 days  Lab Units 12/06/18  0502   SODIUM mmol/L 144   POTASSIUM mmol/L 4 5   CHLORIDE mmol/L 110*   CO2 mmol/L 30   BUN mg/dL 37*   CREATININE mg/dL 1 21   ANION GAP mmol/L 4   CALCIUM mg/dL 7 8*   ALBUMIN g/dL 2 3*   TOTAL BILIRUBIN mg/dL 0 10*   ALK PHOS U/L 71   ALT U/L 19   AST U/L 16   GLUCOSE RANDOM mg/dL 57*       Results from last 7 days  Lab Units 12/05/18  1131   INR  0 98                       * I Have Reviewed All Lab Data Listed Above  * Additional Pertinent Lab Tests Reviewed:  Romaine [address] Admission Reviewed        Recent Cultures (last 7 days):           Last 24 Hours Medication List:     Current Facility-Administered Medications:  acetaminophen 650 mg Oral Q6H PRN [employer], DO    aspirin 81 mg Oral Daily [employer], DO    atorvastatin 40 mg Oral QPM [employer], DO    cefTRIAXone 1,000 mg Intravenous Q24H [employer], DO    collagenase  Topical BID [employer], DO    ferrous sulfate 325 mg Oral BID With Meals [employer], DO    heparin (porcine) 5,000 Units Subcutaneous Q8H 6225 Sydnie Jamil DO    [START ON 12/7/2018] levothyroxine 100 mcg Oral Early Morning [employer], DO    multivitamin-minerals 1 tablet Oral Daily [employer], DO    ondansetron 4 mg Intravenous Q4H PRN [employer], DO    sodium chloride 50 mL/hr Intravenous Continuous [employer], DO Last Rate: 50 mL/hr (12/05/18 2000)        Today, Patient Was Seen By: [employer], DO    ** Please Note: Dictation voice to text software may have been used in the creation of this document   **

## 2018-12-06 NOTE — PLAN OF CARE
Problem: SLP ADULT - SWALLOWING, IMPAIRED  Goal: Initial SLP swallow eval performed  Outcome: Completed Date Met: 12/06/18

## 2018-12-06 NOTE — PLAN OF CARE
Problem: DISCHARGE PLANNING - CARE MANAGEMENT  Goal: Discharge to post-acute care or home with appropriate resources  INTERVENTIONS:  - Conduct assessment to determine patient/family and health care team treatment goals, and need for post-acute services based on payer coverage, community resources, and patient preferences, and barriers to discharge  - Address psychosocial, clinical, and financial barriers to discharge as identified in assessment in conjunction with the patient/family and health care team  - Arrange appropriate level of post-acute services according to patient's   needs and preference and payer coverage in collaboration with the physician and health care team  - Communicate with and update the patient/family, physician, and health care team regarding progress on the discharge plan  - Arrange appropriate transportation to post-acute venues  Outcome: Progressing  -return to [facility]F on dc  -try to obtain authorization with insurance prior to pt returning to SNF  -outpatient follow up after dc

## 2018-12-06 NOTE — UTILIZATION REVIEW
[address]  Utilization Review Department  Phone: [phone]; Fax [phone]  [email or web address]  ATTENTION: Please call with any questions or concerns to [phone]  and carefully listen to the prompts so that you are directed to the right person  Send all requests for admission clinical reviews, approved or denied determinations and any other requests to fax [phone]  All voicemails are confidential   Initial Clinical Review    Admission: Date/Time/Statement:INPT  12/5/18 @ 1358     Orders Placed This Encounter   Procedures    Inpatient Admission     Standing Status:   Standing     Number of Occurrences:   1     Order Specific Question:   Admitting Physician     Answer:   Viola Andrade     Order Specific Question:   Level of Care     Answer:   Med Surg [16]     Order Specific Question:   Bed request comments     Answer:   telemetry     Order Specific Question:   Estimated length of stay     Answer:   More than 2 Midnights     Order Specific Question:   Certification     Answer:   I certify that inpatient services are medically necessary for this patient for a duration of greater than two midnights  See H&P and MD Progress Notes for additional information about the patient's course of treatment           ED: Date/Time/Mode of Arrival:   ED Arrival Information     Expected Arrival Acuity Means of Arrival Escorted By Service Admission Type    - 12/5/2018 11:27 Urgent Ambulance [facility]  [facility]) General Medicine Urgent    Arrival Complaint    mental status change          Chief Complaint:   Chief Complaint   Patient presents with    Altered Mental Status     Patient began this morning with AMS, complained of double vision, headache, garbled speech,       History of Illness: 80 y o  female who currently resides at [facility] and presented to the ED with confusion, a right-sided facial droop, double vision, and expressive aphasia  The patient's symptomatology commenced today, 12/05/2018, but the exact time of onset is unclear  Her symptoms were constant in nature, and nothing seemed to improve her symptoms  In addition, the patient complains of an intermittent headache  ED Vital Signs:   ED Triage Vitals   Temperature Pulse Respirations Blood Pressure SpO2   12/05/18 1900 12/05/18 1134 12/05/18 1134 12/05/18 1134 12/05/18 1134   (!) 96 2 °F (35 7 °C) 60 14 138/65 97 %      Temp Source Heart Rate Source Patient Position - Orthostatic VS BP Location FiO2 (%)   12/05/18 1900 12/05/18 1134 12/05/18 1134 12/05/18 1134 --   Temporal Monitor Sitting Left arm       Pain Score       12/05/18 1134       8        Wt Readings from Last 1 Encounters:   12/06/18 74 6 kg (164 lb 7 4 oz)       Vital Signs (abnormal):   12/05/18 2225   96 6 °F (35 9 °C)  62  20  126/60  --  98 %  Nasal cannula  Lying   12/05/18 2105   96 5 °F (35 8 °C)  66  20  116/76  --  --  --  --   12/05/18 2000   96 3 °F (35 7 °C)  67  20  131/59  --  --  --  Lying   12/05/18 1900   96 2 °F (35 7 °C)  69  22  135/61  --  --  --  Lying   12/05/18 1736  --  65  19  123/60  --  97 %  Nasal cannula  Sitting   12/05/18 1633  --  68  22  123/60  --  96 %  Nasal cannula  Sitting   12/05/18 1154  --  --  --  --  --  --  Nasal cannula  --   12/05/18 1134  --  60  14  138/65  --  97 %  Nasal cannula  Sitting         Abnormal Labs/Diagnostic Test Results:   hgb 11 3  co2 33  Bun 39  Alb 2 8    CT head:  No acute intracranial abnormality          Air-fluid level left sphenoid sinus which may, in the appropriate clinical setting, represent acute sinusitis        Additional paranasal sinus disease as above  cxr:  No acute cardiopulmonary disease          EKG:Normal sinus rhythm with a heart rate of 62 bpm    Vas carotid:Impression  RIGHT:  There is <50% stenosis noted in the internal carotid artery  Plaque is  heterogenous and irregular  Vertebral artery flow is antegrade   There is no significant subclavian artery  disease  LEFT:  There is <50% stenosis noted in the internal carotid artery  Plaque is  heterogenous and irregular  Vertebral artery flow is antegrade  There is no significant subclavian artery  disease  MRI brain: pending  MRA head: pending      ED Treatment:   Medication Administration from 12/05/2018 1046 to 12/05/2018 1919       Date/Time Order Dose Route Action Action by Comments     12/05/2018 1836 cefTRIAXone (ROCEPHIN) IVPB (premix) 1,000 mg 1,000 mg Intravenous [place] Ljuan RN      12/05/2018 1907 collagenase (SANTYL) ointment   Topical Not Given Surekha Schmid, Pharmacist medication out of stock, not available until tomorrow AM          Past Medical/Surgical History: Active Ambulatory Problems     Diagnosis Date Noted    Chronic respiratory failure with hypoxia ([facility] [address] ) 10/17/2018    Acquired hypothyroidism 10/17/2018    Acute encephalopathy 10/17/2018    Generalized weakness 10/17/2018    Presbycusis 10/17/2018    Slow transit constipation 10/17/2018    Acute cystitis with hematuria 43/17/2805    Metabolic encephalopathy 08/23/2150     Resolved Ambulatory Problems     Diagnosis Date Noted    Lethargy 10/20/2018     Past Medical History:   Diagnosis Date    Anemia     Arthritis     Cancer ([facility] [address] )     Disease of thyroid gland     Hyperlipidemia     IBS (irritable bowel syndrome)     Overactive bladder     Peripheral neuropathy     Polio     Poliomyelitis     Renal disorder     Rhabdomyolysis     UTI (urinary tract infection)     Ventral hernia        Admitting Diagnosis: Altered mental status [R41 82]  Facial droop [R29 810]  Change in mental status [R41 82]  Speech abnormality [R47 9]    Age/Sex: 80 y o  female    Assessment/Plan: 79 yo female c/o R sided facial droop, confusion, headache,double vision, expressive aphasia  Stroke pathway, MRI brain,carotid duplex, asa, statin, pt/ot, echo     Acute encephalopathy s/t cystitis- tele, MRI brain, carotid duplex, asa, echo, pt/ot  Anticipated Length of Stay:  Patient will be admitted on an Inpatient basis with an anticipated length of stay of greater than 2 midnights     Justification for Hospital Stay:  The patient requires IV antibiotics, the stroke pathway, an MRI of the brain/MRA of the head, a bilateral carotid duplex, an echocardiogram, and close neurologic status monitoring      Admission Orders:  INPT  TELE  O2  ASPIRATION PRECAUTIONS  PT/OT  SPEECH EVAL  IS  SEQ COMP DEVICE  DAILY WEIGHTS  NEURO CHECKS Q1H, Q2H, Q4H  OOB  DDYSPAHGIA ASSESSMENT  NPO    Scheduled Meds:   Current Facility-Administered Medications:  acetaminophen 650 mg Oral Q6H PRN [employer], DO    aspirin 81 mg Oral Daily [employer], DO    atorvastatin 40 mg Oral QPM [employer], DO    cefTRIAXone 1,000 mg Intravenous Q24H [employer], DO    collagenase  Topical BID [employer], DO    ferrous sulfate 325 mg Oral BID With Meals [employer], DO    heparin (porcine) 5,000 Units Subcutaneous [facility] [employer], DO    levothyroxine 88 mcg Oral Early Morning [employer], DO    multivitamin-minerals 1 tablet Oral Daily [employer], DO    ondansetron 4 mg Intravenous Q4H PRN [employer], DO    sodium chloride 50 mL/hr Intravenous Continuous [employer], DO Last Rate: 50 mL/hr (12/05/18 2000)     Continuous Infusions:   sodium chloride 50 mL/hr Last Rate: 50 mL/hr (12/05/18 2000)     PRN Meds:   acetaminophen    ondansetron

## 2018-12-06 NOTE — PHYSICAL THERAPY NOTE
Physical Therapy Evaluation    Patient Name: Stevo Madrid    QWGAT'V Date: 12/6/2018     Problem List  Patient Active Problem List   Diagnosis    Chronic respiratory failure with hypoxia (HCC)    Acquired hypothyroidism    Acute encephalopathy    Generalized weakness    Presbycusis    Slow transit constipation    Acute cystitis with hematuria    Metabolic encephalopathy    Facial droop    Visual disturbance    Macrocytic anemia    CKD (chronic kidney disease) stage 4, GFR 15-29 ml/min (HCC)    Right bundle branch block    Elevated TSH        Past Medical History  Past Medical History:   Diagnosis Date    Anemia     Arthritis     Cancer ([address] )     breast    Disease of thyroid gland     Hyperlipidemia     IBS (irritable bowel syndrome)     Overactive bladder     Peripheral neuropathy     Polio     Poliomyelitis     Renal disorder     Rhabdomyolysis     UTI (urinary tract infection)     Ventral hernia         Past Surgical History  Past Surgical History:   Procedure Laterality Date    APPENDECTOMY      BREAST SURGERY      right mastectomy           12/06/18 1149   Note Type   Note type Eval only   Pain Assessment   Pain Assessment FLACC   Pain Score No Pain   Pain Rating: FLACC (Rest) - Face 0   Pain Rating: FLACC (Rest) - Legs 0   Pain Rating: FLACC (Rest) - Activity 0   Pain Rating: FLACC (Rest) - Cry 0   Pain Rating: FLACC (Rest) - Consolability 0   Score: FLACC (Rest) 0   Home Living   Type of Home SNF   Additional Comments Pt was modified Independent with w/c mobility at [facility]   Pt requiring CGA/(S) for ambulation short distance with RW and requires assistance with ADL's and IADL's   Prior Function   Level of Ward Modified independent with wheelchair;Needs assistance with ADLs and functional mobility   Lives With Facility staff   Receives Help From Personal care attendant   ADL Assistance Needs assistance IADLs Needs assistance   Restrictions/Precautions   Other Precautions Bed Alarm;Multiple lines;Telemetry; Fall Risk   General   Family/Caregiver Present No   Cognition   Arousal/Participation Arousable  (but lethargic)   Orientation Level Oriented to person   Following Commands Follows one step commands inconsistently   RUE Assessment   RUE Assessment (pt not following commands for ROM or strength testing)   RLE Assessment   RLE Assessment X  (limited AROM, PROM ankle knee and hip WFL)   LLE Assessment   LLE Assessment WFL  (AAROM, at least 3+-pt not following commands for testing)   Coordination   Sensation (unable to assess, aphasia and not following commands)   Bed Mobility   Rolling L 2  Maximal assistance   Additional items Assist x 1;Assist x 2;Verbal cues; Increased time required; Bedrails;LE management   Supine to Sit 2  Maximal assistance   Additional items Assist x 2;Bedrails; Increased time required;LE management;Verbal cues   Sit to Supine 2  Maximal assistance   Additional items Assist x 2;Verbal cues;LE management; Increased time required   Additional Comments pt able to maintain upright sitting at EOB however with intermittent tremors disrupting balance then requiring min(A) for safety  Pt not following commands for ROM or strength testing at EOB  Pt with history of R LE weakness from polio however pt is know to department and a baseline is able to move all extremities  Transfers   Sit to Stand 2  Maximal assistance   Additional items Assist x 2; Increased time required   Stand to Sit 2  Maximal assistance   Additional items Assist x 2;Verbal cues; Increased time required   Additional Comments pt with poor standing balance and only able to achieve 3/4 stand on 2 attempts   Pt unable to ambulate or sidestep   Balance   Static Sitting Fair +   Dynamic Sitting Fair  (LOB posterior with LE PROM and increased tremors)   Static Standing Poor   Dynamic Standing Poor   Endurance Deficit   Endurance Deficit Yes Endurance Deficit Description limited tolerance for activity at 15 minutes requesting to be returned to supine in bed  Pt positioned in R sidelying with call bell in reach  Activity Tolerance   Activity Tolerance Patient limited by fatigue;Treatment limited secondary to medical complications (Comment)   Assessment   Prognosis Good   Problem List Decreased strength;Decreased range of motion;Decreased endurance; Impaired balance;Decreased mobility; Decreased cognition; Impaired judgement;Decreased safety awareness   Assessment Pt is a 80year old female presenting to [facility] from [address] with confusion facial droop double vision and expressive aphasia  Pt with complex PMH including anemia poliomyelitis breast CA with R mastectomy peripheral neuropathy and ventral hernia repair  Pt seen for high complexity PT evaluation presenting with decreased cognition decreased command following with increased lethargic and decreased ROM strength balance safety awareness mobility transfers and ability to ambulate  Pt is requiring max(A)x1-2 for all bed mobility and transfers  Pt with intermittent tremoring affecting balance and mobility  Pt is in need of continued activity in PT to improve safety balance endurance strength mobility transfers and short distance ambulation or w/c mobility  Anticipate pt will return to SNF on discharge as she is a long term resident  Goals   Patient Goals unable to state goal due to current mental state   LTG Expiration Date 12/20/18   Long Term Goal #1 Improve bilateral LE ROM to [facility] and improve LE strength to at least 4/5 to improve all bed mobility to min(A)x1 with good sitting balance no LOB   Long Term Goal #2 improve sitting standing and ambulatory balance to fair to fair+ to improve ambulation to 50ft with RW min(A)x1 and to improve w/c mobility to 50ft (S)   Plan   Treatment/Interventions Functional transfer training;LE strengthening/ROM; Therapeutic exercise; Endurance training;Patient/family training;Bed mobility;Gait training   PT Frequency (3-5x/wk)   Recommendation   Recommendation Long-term skilled PT;Long-term skilled nursing home placement   PT - OK to Discharge No  (when medically stable for discharge)   Pt with SCD's on when PT entered room   SCD's reapplied and turned on with pt supine in bed with call bell in reach alarm on

## 2018-12-06 NOTE — PROGRESS NOTES
Patient's temp at 0502 was 97 6  [facility] removed from patient  Blankets kept on patient  Will continue to monitor

## 2018-12-06 NOTE — ASSESSMENT & PLAN NOTE
Results for Gayla Cooley (MRN [id number]) as of 12/6/2018 10:23   Ref   Range 12/6/2018 05:02   TSH 3RD GENERATON Latest Ref Range: 0 358 - 3 740 uIU/mL 5 787 (H)     · Increase levothyroxine to 100 micrograms PO Qdaily in the early morning

## 2018-12-07 PROBLEM — I31.3 PERICARDIAL EFFUSION: Status: ACTIVE | Noted: 2018-12-07

## 2018-12-07 PROBLEM — R13.10 DYSPHAGIA: Status: ACTIVE | Noted: 2018-12-07

## 2018-12-07 PROBLEM — I27.20 PULMONARY HYPERTENSION (HCC): Status: ACTIVE | Noted: 2018-12-07

## 2018-12-07 PROBLEM — I35.0 AORTIC VALVE STENOSIS: Status: ACTIVE | Noted: 2018-12-07

## 2018-12-07 PROBLEM — E87.0 HYPERNATREMIA: Status: ACTIVE | Noted: 2018-12-07

## 2018-12-07 LAB
ANION GAP BLD CALC-SCNC: 14 MMOL/L (ref 4–13)
ANION GAP SERPL CALCULATED.3IONS-SCNC: 8 MMOL/L (ref 4–13)
BUN BLD-MCNC: 44 MG/DL (ref 5–25)
BUN SERPL-MCNC: 36 MG/DL (ref 5–25)
CA-I BLD-SCNC: 1.18 MMOL/L (ref 1.12–1.32)
CALCIUM SERPL-MCNC: 7.9 MG/DL (ref 8.3–10.1)
CHLORIDE BLD-SCNC: 103 MMOL/L (ref 100–108)
CHLORIDE SERPL-SCNC: 112 MMOL/L (ref 100–108)
CO2 SERPL-SCNC: 29 MMOL/L (ref 21–32)
CREAT BLD-MCNC: 1.2 MG/DL (ref 0.6–1.3)
CREAT SERPL-MCNC: 1.41 MG/DL (ref 0.6–1.3)
ERYTHROCYTE [DISTWIDTH] IN BLOOD BY AUTOMATED COUNT: 14.8 % (ref 11.6–15.1)
GFR SERPL CREATININE-BSD FRML MDRD: 32 ML/MIN/1.73SQ M
GFR SERPL CREATININE-BSD FRML MDRD: 39 ML/MIN/1.73SQ M
GLUCOSE SERPL-MCNC: 65 MG/DL (ref 65–140)
GLUCOSE SERPL-MCNC: 75 MG/DL (ref 65–140)
HCT VFR BLD AUTO: 33 % (ref 34.8–46.1)
HCT VFR BLD CALC: 37 % (ref 34.8–46.1)
HGB BLD-MCNC: 10.1 G/DL (ref 11.5–15.4)
HGB BLDA-MCNC: 12.6 G/DL (ref 11.5–15.4)
MAGNESIUM SERPL-MCNC: 2.4 MG/DL (ref 1.6–2.6)
MCH RBC QN AUTO: 31.3 PG (ref 26.8–34.3)
MCHC RBC AUTO-ENTMCNC: 30.6 G/DL (ref 31.4–37.4)
MCV RBC AUTO: 102 FL (ref 82–98)
PCO2 BLD: 32 MMOL/L (ref 21–32)
PLATELET # BLD AUTO: 308 THOUSANDS/UL (ref 149–390)
PMV BLD AUTO: 9.6 FL (ref 8.9–12.7)
POTASSIUM BLD-SCNC: 4.7 MMOL/L (ref 3.5–5.3)
POTASSIUM SERPL-SCNC: 4.4 MMOL/L (ref 3.5–5.3)
RBC # BLD AUTO: 3.23 MILLION/UL (ref 3.81–5.12)
SODIUM BLD-SCNC: 143 MMOL/L (ref 136–145)
SODIUM SERPL-SCNC: 149 MMOL/L (ref 136–145)
SPECIMEN SOURCE: ABNORMAL
WBC # BLD AUTO: 7.2 THOUSAND/UL (ref 4.31–10.16)

## 2018-12-07 PROCEDURE — 85027 COMPLETE CBC AUTOMATED: CPT | Performed by: INTERNAL MEDICINE

## 2018-12-07 PROCEDURE — 97110 THERAPEUTIC EXERCISES: CPT

## 2018-12-07 PROCEDURE — G8988 SELF CARE GOAL STATUS: HCPCS

## 2018-12-07 PROCEDURE — 92526 ORAL FUNCTION THERAPY: CPT

## 2018-12-07 PROCEDURE — 99232 SBSQ HOSP IP/OBS MODERATE 35: CPT | Performed by: INTERNAL MEDICINE

## 2018-12-07 PROCEDURE — 80048 BASIC METABOLIC PNL TOTAL CA: CPT | Performed by: INTERNAL MEDICINE

## 2018-12-07 PROCEDURE — 83735 ASSAY OF MAGNESIUM: CPT | Performed by: INTERNAL MEDICINE

## 2018-12-07 PROCEDURE — 97530 THERAPEUTIC ACTIVITIES: CPT

## 2018-12-07 PROCEDURE — G8987 SELF CARE CURRENT STATUS: HCPCS

## 2018-12-07 PROCEDURE — 97167 OT EVAL HIGH COMPLEX 60 MIN: CPT

## 2018-12-07 RX ORDER — SODIUM CHLORIDE 450 MG/100ML
75 INJECTION, SOLUTION INTRAVENOUS CONTINUOUS
Status: DISCONTINUED | OUTPATIENT
Start: 2018-12-07 | End: 2018-12-08

## 2018-12-07 RX ADMIN — CEFTRIAXONE 1000 MG: 1 INJECTION, SOLUTION INTRAVENOUS at 19:20

## 2018-12-07 RX ADMIN — HEPARIN SODIUM 5000 UNITS: 5000 INJECTION, SOLUTION INTRAVENOUS; SUBCUTANEOUS at 14:20

## 2018-12-07 RX ADMIN — SODIUM CHLORIDE 75 ML/HR: 0.45 INJECTION, SOLUTION INTRAVENOUS at 11:51

## 2018-12-07 RX ADMIN — MULTIPLE VITAMINS W/ MINERALS TAB 1 TABLET: TAB at 09:12

## 2018-12-07 RX ADMIN — FERROUS SULFATE TAB 325 MG (65 MG ELEMENTAL FE) 325 MG: 325 (65 FE) TAB at 16:29

## 2018-12-07 RX ADMIN — COLLAGENASE SANTYL: 250 OINTMENT TOPICAL at 19:24

## 2018-12-07 RX ADMIN — HEPARIN SODIUM 5000 UNITS: 5000 INJECTION, SOLUTION INTRAVENOUS; SUBCUTANEOUS at 05:36

## 2018-12-07 RX ADMIN — ASPIRIN 81 MG 81 MG: 81 TABLET ORAL at 09:12

## 2018-12-07 RX ADMIN — ATORVASTATIN CALCIUM 40 MG: 40 TABLET, FILM COATED ORAL at 19:21

## 2018-12-07 RX ADMIN — LEVOTHYROXINE SODIUM 100 MCG: 100 TABLET ORAL at 05:36

## 2018-12-07 RX ADMIN — FERROUS SULFATE TAB 325 MG (65 MG ELEMENTAL FE) 325 MG: 325 (65 FE) TAB at 09:12

## 2018-12-07 RX ADMIN — HEPARIN SODIUM 5000 UNITS: 5000 INJECTION, SOLUTION INTRAVENOUS; SUBCUTANEOUS at 21:16

## 2018-12-07 RX ADMIN — COLLAGENASE SANTYL 1 APPLICATION: 250 OINTMENT TOPICAL at 09:13

## 2018-12-07 NOTE — ASSESSMENT & PLAN NOTE
· Continue the stroke pathway  · An MRI of the brain was completed on 12/06/2018 with the following results/impression:  FINDINGS:     BRAIN PARENCHYMA:  Mild patchy periventricular white matter FLAIR/T2 hyperintensity suggesting chronic microangiopathic change  Appearance is similar to October  No acute infarct  No parenchymal hemorrhage  No mass  Chronic calcifications again   demonstrated within the basal ganglia      VENTRICLES AND EXTRA-AXIAL SPACES:  Mild involutional volume loss  No hydrocephalus, herniation, or mass effect  No extra-axial or intraventricular hemorrhage      SELLA AND PITUITARY GLAND:  Normal      ORBITS:  Prior bilateral lens surgeries  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  No acute orbital findings      PARANASAL SINUSES:  Mild chronic mucosal disease in the maxillary, frontal, and sphenoid sinuses as well as and the anterior ethmoid air cells  No fluid levels  No mastoid effusions      VASCULATURE:  Evaluation of the major intracranial vasculature demonstrates appropriate flow voids  MRA from the same day is reported separately      CALVARIUM AND SKULL BASE:  Normal      EXTRACRANIAL SOFT TISSUES:  Normal      IMPRESSION:     1  No acute intracranial findings or significant change from prior  Specifically, no infarct      2   Mild chronic sinus mucosal disease  · An MRA of the head was completed on 12/06/2018 with the following impression/results:  FINDINGS:     IMAGE QUALITY:  Diagnostic      ANATOMY     INTERNAL CAROTID ARTERIES:  Luminal irregularity within the cavernous segment of the internal carotids suggests atheromatous disease, also seen on CT  No flow-limiting stenoses demonstrated  Normal ICA termini      ANTERIOR CIRCULATION:  Normal A1 segments  Normal anterior communicating artery    Normal flow-related enhancement of the anterior cerebral arteries      MIDDLE CEREBRAL ARTERY CIRCULATION:  The M1 segment and middle cerebral artery branches demonstrate normal flow-related enhancement      DISTAL VERTEBRAL ARTERIES:  Congenital left dominance--and normal variant  No flow-limiting stenoses  The posterior inferior cerebellar artery origins are normal       BASILAR ARTERY:  Normal      POSTERIOR CEREBRAL ARTERIES:  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  Both arteries demonstrate normal flow-related enhancement  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      IMPRESSION:     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  No occlusive vasculopathy or aneurysms demonstrated in the intracranial circulation  · A bilateral carotid duplex was completed on 12/05/2018 with the following results/impression:  CONCLUSION:     Impression  RIGHT:  There is <50% stenosis noted in the internal carotid artery  Plaque is  heterogenous and irregular  Vertebral artery flow is antegrade  There is no significant subclavian artery  disease  LEFT:  There is <50% stenosis noted in the internal carotid artery  Plaque is  heterogenous and irregular  Vertebral artery flow is antegrade  There is no significant subclavian artery  disease  There are no priors for comparison  Challenging exam due to pronounced snoring and inability to stay awake for  exam      Internal carotid artery stenosis determination by consensus criteria from:  Alyssa Kuhn , et al  Carotid Artery Stenosis: Gray-Scale and Doppler US Diagnosis  - Society of Radiologists in [address] [place] [address], Radiology 2003;  [phone]  SIGNATURE:  Electronically Signed by: Elvia Bolden on 2018-12-05 11:51:32 PM    · An echocardiogram was completed on 12/06/2018 with the following results:  SUMMARY     PROCEDURE INFORMATION:  This was a technically difficult study      LEFT VENTRICLE:  Size was normal   Systolic function was normal  Ejection fraction was estimated to be 60 %    There were no regional wall motion abnormalities  Wall thickness was at the upper limits of normal      RIGHT VENTRICLE:  The size was at the upper limits of normal      MITRAL VALVE:  There was mild annular calcification      AORTIC VALVE:  The valve was probably trileaflet  There was mild to moderate stenosis  Valve peak gradient was 25 95 mmHg  Valve mean gradient was 16 04 mmHg  Aortic valve area was 1 4 cm squared by the continuity equation      TRICUSPID VALVE:  There was mild regurgitation  Estimated peak PA pressure was 35 mmHg      PERICARDIUM:  A small pericardial effusion was identified circumferential to the heart  There was no evidence of hemodynamic compromise      HISTORY: PRIOR HISTORY: echo 10/2018, aortic stenosis     PROCEDURE: The procedure was performed at the bedside  This was a routine study  The transthoracic approach was used  The study included complete 2D imaging, M-mode, complete spectral Doppler, and color Doppler  The heart rate was 80 bpm,  at the start of the study  This was a technically difficult study      LEFT VENTRICLE: Size was normal  Systolic function was normal  Ejection fraction was estimated to be 60 %  There were no regional wall motion abnormalities  Wall thickness was at the upper limits of normal      RIGHT VENTRICLE: The size was at the upper limits of normal  Systolic function was normal  Wall thickness was normal      LEFT ATRIUM: Size was normal      RIGHT ATRIUM: Size was at the upper limits of normal      MITRAL VALVE: There was mild annular calcification  DOPPLER: There was no significant regurgitation      AORTIC VALVE: The valve was probably trileaflet  DOPPLER: There was mild to moderate stenosis  There was no significant regurgitation      TRICUSPID VALVE: DOPPLER: There was mild regurgitation  Estimated peak PA pressure was 35 mmHg      PULMONIC VALVE: Not well visualized      PERICARDIUM: A small pericardial effusion was identified circumferential to the heart   There was no evidence of hemodynamic compromise      AORTA: The root exhibited normal size      MEASUREMENT TABLES     DOPPLER MEASUREMENTS  Aortic valve   (Reference normals)  Peak gradient   25 95 mmHg   (--)  Mean gradient   16 04 mmHg   (--)  Valve area, cont   1 4 cm squared   (--)     SYSTEM MEASUREMENT TABLES     2D  %FS: 34 75 %  Ao Diam: 2 66 cm  EDV(Teich): 83 84 ml  EF(Teich): 64 23 %  ESV(Teich): 29 99 ml  IVSd: 1 24 cm  LA Diam: 3 08 cm  LVIDd: 4 32 cm  LVIDs: 2 82 cm  LVOT Diam: 1 93 cm  LVPWd: 1 01 cm  SV(Teich): 53 85 ml     CW  AV Env  Ti: 328 72 ms  AV VTI: 62 55 cm  AV Vmax: 2 57 m/s  AV Vmax: 2 55 m/s  AV Vmean: 1 9 m/s  AV maxP 51 mmHg  AV maxP 95 mmHg  AV meanP 04 mmHg  TR Vmax: 2 96 m/s  TR maxP 09 mmHg     PW  SHERMAN Vmax, Pt: 1 38 cm2  SHERMAN Vmax, Pt: 1 37 cm2  E' Sept: 0 07 m/s  E/E' Sept: 13 34  LVOT Vmax: 1 21 m/s  LVOT maxP 84 mmHg  MV A [place]: 1 15 m/s  MV Dec [place]: 6 71 m/s2  MV DecT: 133 78 ms  MV E Martin: 0 9 m/s  MV E/A Ratio: 0 78     [employer] Accredited Echocardiography Laboratory     Prepared and electronically signed by  Amber Becerra MD  Signed 06-Dec-2018 10:36:00    · Aspirin 81 mg PO Qdaily  · High-intensity statin therapy was initiated with Atorvastatin 40 mg PO daily  · Continue telemetry monitoring to watch for any signs of atrial fibrillation/atrial flutter, which could cause an embolic CVA  · PT/OT

## 2018-12-07 NOTE — PLAN OF CARE
Problem: Potential for Falls  Goal: Patient will remain free of falls  INTERVENTIONS:  - Assess patient frequently for physical needs  -  Identify cognitive and physical deficits and behaviors that affect risk of falls  -  [place] fall precautions as indicated by assessment  High fall risk    - Educate patient/family on patient safety including physical limitations  - Instruct patient to call for assistance with activity based on assessment  - Modify environment to reduce risk of injury  - Consider OT/PT consult to assist with strengthening/mobility     Outcome: Progressing      Problem: Prexisting or High Potential for Compromised Skin Integrity  Goal: Skin integrity is maintained or improved  INTERVENTIONS:  - Identify patients at risk for skin breakdown  - Assess and monitor skin integrity  - Assess and monitor nutrition and hydration status  - Monitor labs (i e  albumin)  - Assess for incontinence   - Turn and reposition patient  - Assist with mobility/ambulation  - Relieve pressure over bony prominences  - Avoid friction and shearing  - Provide appropriate hygiene as needed including keeping skin clean and dry  - Evaluate need for skin moisturizer/barrier cream  - Collaborate with interdisciplinary team (i e  Nutrition, Rehabilitation, etc )   - Patient/family teaching   Outcome: Progressing      Problem: Nutrition/Hydration-ADULT  Goal: Nutrient/Hydration intake appropriate for improving, restoring or maintaining nutritional needs  Monitor and assess patient's nutrition/hydration status for malnutrition (ex- brittle hair, bruises, dry skin, pale skin and conjunctiva, muscle wasting, smooth red tongue, and disorientation)  Collaborate with interdisciplinary team and initiate plan and interventions as ordered  Monitor patient's weight and dietary intake as ordered or per policy  Utilize nutrition screening tool and intervene per policy   Determine patient's food preferences and provide high-protein, high-caloric foods as appropriate  INTERVENTIONS:  - Monitor oral intake, urinary output, labs, and treatment plans  - Assess nutrition and hydration status and recommend course of action  - Evaluate amount of meals eaten  - Assist patient with eating if necessary   - Allow adequate time for meals  - Recommend/ encourage appropriate diets, oral nutritional supplements, and vitamin/mineral supplements  - Order, calculate, and assess calorie counts as needed  - Recommend, monitor, and adjust tube feedings and TPN/PPN based on assessed needs  - Assess need for intravenous fluids  - Provide specific nutrition/hydration education as appropriate  - Include patient/family/caregiver in decisions related to nutrition   Outcome: Progressing      Problem: Neurological Deficit  Goal: Neurological status is stable or improving  Interventions:  - Monitor and assess patient's level of consciousness, motor function, sensory function, and level of assistance needed for ADLs  - Monitor and report changes from baseline  Collaborate with interdisciplinary team to initiate plan and implement interventions as ordered  - Provide and maintain a safe environment  - Utilize seizure precautions  - Utilize fall precautions  - Utilize aspiration precautions  - Utilize bleeding precautions  Outcome: Progressing      Problem: Activity Intolerance/Impaired Mobility  Goal: Mobility/activity is maintained at optimum level for patient  Interventions:  - Assess and monitor patient  barriers to mobility and need for assistive/adaptive devices  - Assess patient's emotional response to limitations  - Collaborate with interdisciplinary team and initiate plans and interventions as ordered  - Encourage independent activity per ability   - Maintain proper body alignment  - Perform active/passive rom as tolerated/ordered    - Plan activities to conserve energy   - Turn patient   Outcome: Progressing      Problem: Communication Impairment  Goal: Ability to express needs and understand communication  Assess patient's communication skills and ability to understand information  Patient will demonstrate use of effective communication techniques, alternative methods of communication and understanding even if not able to speak  - Encourage communication and provide alternate methods of communication as needed  - Collaborate with case management/ for discharge needs  - Include patient/family/caregiver in decisions related to communication  Outcome: Progressing      Problem: Potential for Aspiration  Goal: Non-ventilated patient's risk of aspiration is minimized  Assess and monitor vital signs, respiratory status, and labs (WBC)  Monitor for signs of aspiration (tachypnea, cough, rales, wheezing, cyanosis, fever)  - Assess and monitor patient's ability to swallow  - Place patient up in chair to eat if possible  - HOB up at 90 degrees to eat if unable to get patient up into chair   - Supervise patient during oral intake  - Instruct patient to take small bites  - Instruct patient to take small single sips when taking liquids  - Follow patient-specific strategies generated by speech pathologist    Outcome: Progressing      Problem: Nutrition  Goal: Nutrition/Hydration status is improving  Monitor and assess patient's nutrition/hydration status for malnutrition (ex- brittle hair, bruises, dry skin, pale skin and conjunctiva, muscle wasting, smooth red tongue, and disorientation)  Collaborate with interdisciplinary team and initiate plan and interventions as ordered  Monitor patient's weight and dietary intake as ordered or per policy  Utilize nutrition screening tool and intervene per policy  Determine patient's food preferences and provide high-protein, high-caloric foods as appropriate  - Assist patient with eating   - Allow adequate time for meals   - Encourage patient to take dietary supplement as ordered    - Collaborate with clinical nutritionist   - Include patient/family/caregiver in decisions related to nutrition     Outcome: Progressing      Problem: PAIN - ADULT  Goal: Verbalizes/displays adequate comfort level or baseline comfort level  Interventions:  - Encourage patient to monitor pain and request assistance  - Assess pain using appropriate pain scale  - Administer analgesics based on type and severity of pain and evaluate response  - Implement non-pharmacological measures as appropriate and evaluate response  - Consider cultural and social influences on pain and pain management  - Notify physician/advanced practitioner if interventions unsuccessful or patient reports new pain   Outcome: Progressing      Problem: INFECTION - ADULT  Goal: Absence or prevention of progression during hospitalization  INTERVENTIONS:  - Assess and monitor for signs and symptoms of infection  - Monitor lab/diagnostic results  - Monitor all insertion sites, i e  indwelling lines, tubes, and drains  - Administer medications as ordered  - Instruct and encourage patient and family to use good hand hygiene technique  - Identify and instruct in appropriate isolation precautions for identified infection/condition   Outcome: Progressing      Problem: SAFETY ADULT  Goal: Maintain or return to baseline ADL function  INTERVENTIONS:  -  Assess patient's ability to carry out ADLs; assess patient's baseline for ADL function and identify physical deficits which impact ability to perform ADLs (bathing, care of mouth/teeth, toileting, grooming, dressing, etc )  - Assess/evaluate cause of self-care deficits   - Assess range of motion  - Assess patient's mobility; develop plan if impaired  - Assess patient's need for assistive devices and provide as appropriate  - Encourage maximum independence but intervene and supervise when necessary  ¯ Involve family in performance of ADLs  ¯ Assess for home care needs following discharge   ¯ Request OT consult to assist with ADL evaluation and planning for discharge  ¯ Provide patient education as appropriate   Outcome: Progressing    Goal: Maintain or return mobility status to optimal level  INTERVENTIONS:  - Assess patient's baseline mobility status (ambulation, transfers, stairs, etc )    - Identify cognitive and physical deficits and behaviors that affect mobility  - Identify mobility aids required to assist with transfers and/or ambulation (gait belt, sit-to-stand, lift, walker, cane, etc )  - [place] fall precautions as indicated by assessment  - Record patient progress and toleration of activity level on Mobility SBAR; progress patient to next Phase/Stage  - Instruct patient to call for assistance with activity based on assessment  - Request Rehabilitation consult to assist with strengthening/weightbearing, etc    Outcome: Progressing    Goal: Patient will remain free of falls  INTERVENTIONS:  - Assess patient frequently for physical needs  -  Identify cognitive and physical deficits and behaviors that affect risk of falls  -  [place] fall precautions as indicated by assessment   High fall risk    - Educate patient/family on patient safety including physical limitations  - Instruct patient to call for assistance with activity based on assessment  - Modify environment to reduce risk of injury  - Consider OT/PT consult to assist with strengthening/mobility     Outcome: Progressing      Problem: DISCHARGE PLANNING  Goal: Discharge to home or other facility with appropriate resources  INTERVENTIONS:  - Identify barriers to discharge w/patient and caregiver  - Arrange for needed discharge resources and transportation as appropriate  - Identify discharge learning needs (meds, wound care, etc )  - Arrange for interpretive services to assist at discharge as needed  - Refer to Case Management Department for coordinating discharge planning if the patient needs post-hospital services based on physician/advanced practitioner order or complex needs related to functional status, cognitive ability, or social support system   Outcome: Progressing      Problem: Knowledge Deficit  Goal: Patient/family/caregiver demonstrates understanding of disease process, treatment plan, medications, and discharge instructions  Complete learning assessment and assess knowledge base    Interventions:  - Provide teaching at level of understanding  - Provide teaching via preferred learning methods   Outcome: Progressing      Problem: DISCHARGE PLANNING - CARE MANAGEMENT  Goal: Discharge to post-acute care or home with appropriate resources  INTERVENTIONS:  - Conduct assessment to determine patient/family and health care team treatment goals, and need for post-acute services based on payer coverage, community resources, and patient preferences, and barriers to discharge  - Address psychosocial, clinical, and financial barriers to discharge as identified in assessment in conjunction with the patient/family and health care team  - Arrange appropriate level of post-acute services according to patient's   needs and preference and payer coverage in collaboration with the physician and health care team  - Communicate with and update the patient/family, physician, and health care team regarding progress on the discharge plan  - Arrange appropriate transportation to post-acute venues   Outcome: Progressing

## 2018-12-07 NOTE — SPEECH THERAPY NOTE
Speech Language/Pathology    Speech/Language Pathology Progress Note    Patient Name: Alina Day  HFIGE'O Date: 12/7/2018     Problem List  Patient Active Problem List   Diagnosis    Chronic respiratory failure with hypoxia (HCC)    Acquired hypothyroidism    Acute encephalopathy    Generalized weakness    Presbycusis    Slow transit constipation    Acute cystitis with hematuria    Metabolic encephalopathy    Facial droop    Visual disturbance    Macrocytic anemia    CKD (chronic kidney disease) stage 4, GFR 15-29 ml/min ([facility])    Right bundle branch block    Elevated TSH    Hypernatremia    Dysphagia    Aortic valve stenosis    [facility]monary hypertension [facility] [address] [facility]    Pericardial effusion        Past Medical History  Past Medical History:   Diagnosis Date    Anemia     Arthritis     Cancer ([facility] [address] [facility]     breast    Disease of thyroid gland     Hyperlipidemia     IBS (irritable bowel syndrome)     Overactive bladder     Peripheral neuropathy     Polio     Poliomyelitis     Renal disorder     Rhabdomyolysis     UTI (urinary tract infection)     Ventral hernia         Past Surgical History  Past Surgical History:   Procedure Laterality Date    APPENDECTOMY      BREAST SURGERY      right mastectomy         Subjective:  Asleep, but easily arouseable  Upper dentures placed  Objective:  Seen for swallow therapy  Po diet of level 1 and HTL initated as pt with improved alertness and verbalization noted by treatment team   Roxy Manuel was ruled out  Baseline diet is regular and thin  Nursing reports pt 's POA fed hr lunch which was well tolerated  Reassed with puree, soft cracker, thin water via cup and straw and nectar ensure  Oral stage was functional for puree, soft cracker  Was dry and required multiple liquid sips to aid in breakdown/faciliatation of A-P transfer  Coughing appreciated with thin by cup swallows secondary to large bolus taken  No s/s by straw, however pt did drink impulsively   [place] liquids are best tolerated at this time  Assessment:  Managed pureed foods and nectar liquids best at this time    Plan/Recommendations:  Advance to nectar thick liquids by straw  Continue sw  Therapy to advance to full  potential in diet  Administer meds in puree  Aspiration precautions   Rosemarie Gardner, CCC/SLP  [id number]  [email or web address]  Available via tiger text

## 2018-12-07 NOTE — ASSESSMENT & PLAN NOTE
Results for Floridalma Kyle (MRN [id number]) as of 12/6/2018 10:23   Ref   Range 12/6/2018 05:02   TSH 3RD GENERATON Latest Ref Range: 0 358 - 3 740 uIU/mL 5 787 (H)     · Increase levothyroxine to 100 micrograms PO Qdaily in the early morning  · Recheck a TSH level in 4-6 weeks

## 2018-12-07 NOTE — PLAN OF CARE
Problem: OCCUPATIONAL THERAPY ADULT  Goal: Performs self-care activities at highest level of function for planned discharge setting  See evaluation for individualized goals  Treatment Interventions: ADL retraining, Functional transfer training, UE strengthening/ROM, Endurance training, Cognitive reorientation, Patient/family training, Activityengagement          See flowsheet documentation for full assessment, interventions and recommendations  Limitation: Decreased ADL status, Decreased UE strength, Decreased Safe judgement during ADL, Decreased cognition, Decreased endurance, Decreased self-care trans, Decreased high-level ADLs, Decreased UE ROM     Assessment: Pt is a 80 y o  female seen for OT evaluation s/p admit to [facility] on 12/5/2018 w/ Acute encephalopathy  Comorbidities affecting pt's functional performance at time of assessment include: HTN, obesity, limited hearing, limited cognition, dementia and facial droop, decreased mobility  Personal factors affecting pt at time of IE include:steps to enter environment, difficulty performing ADLS, difficulty performing IADLS , limited insight into deficits, flat affect, decreased initiation and engagement  and health management   Prior to admission, pt was (A) with ADL/IADL performance; pt reports she does not get OOB  Upon evaluation: Pt requires max (A) x2 with no device for stand pivot transfer 2* the following deficits impacting occupational performance: weakness, decreased ROM, decreased strength, decreased balance, decreased tolerance, impaired attention, impaired initiation, impaired memory, impaired sequencing, impaired problem solving, decreased safety awareness, increased pain and decreased coping skills  Pt to benefit from continued skilled OT tx while in the hospital to address deficits as defined above and maximize level of functional independence w ADL's and functional mobility   Occupational Performance areas to address include: grooming and bed mobility, stand pivot transfers  From OT standpoint, recommendation at time of d/c would be return to SNF upon discharge for 24 hr (A) and (S)         OT Discharge Recommendation: 24 hour supervision/assist

## 2018-12-07 NOTE — PROGRESS NOTES
Progress Note - Haroldine Patch 1/3/1924, 80 y o  female MRN: [id number]    Unit/Bed#: 383-13 Encounter: [id number]    Primary Care Provider: Heber Hutchison DO   Date and time admitted to hospital: 12/5/2018 11:27 AM        * Acute encephalopathy   Assessment & Plan    · Also with expressive aphasia  · Possibly secondary to acute cystitis  · Continue the stroke pathway  · An MRI of the brain was completed on 12/06/2018 with the following results/impression:  FINDINGS:     BRAIN PARENCHYMA:  Mild patchy periventricular white matter FLAIR/T2 hyperintensity suggesting chronic microangiopathic change  Appearance is similar to October  No acute infarct  No parenchymal hemorrhage  No mass  Chronic calcifications again   demonstrated within the basal ganglia      VENTRICLES AND EXTRA-AXIAL SPACES:  Mild involutional volume loss  No hydrocephalus, herniation, or mass effect  No extra-axial or intraventricular hemorrhage      SELLA AND PITUITARY GLAND:  Normal      ORBITS:  Prior bilateral lens surgeries  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  No acute orbital findings      PARANASAL SINUSES:  Mild chronic mucosal disease in the maxillary, frontal, and sphenoid sinuses as well as and the anterior ethmoid air cells  No fluid levels  No mastoid effusions      VASCULATURE:  Evaluation of the major intracranial vasculature demonstrates appropriate flow voids  MRA from the same day is reported separately      CALVARIUM AND SKULL BASE:  Normal      EXTRACRANIAL SOFT TISSUES:  Normal      IMPRESSION:     1  No acute intracranial findings or significant change from prior  Specifically, no infarct      2   Mild chronic sinus mucosal disease      · An MRA of the head was completed on 12/06/2018 with the following impression/results:  FINDINGS:     IMAGE QUALITY:  Diagnostic      ANATOMY     INTERNAL CAROTID ARTERIES:  Luminal irregularity within the cavernous segment of the internal carotids suggests atheromatous disease, also seen on CT  No flow-limiting stenoses demonstrated  Normal ICA termini      ANTERIOR CIRCULATION:  Normal A1 segments  Normal anterior communicating artery  Normal flow-related enhancement of the anterior cerebral arteries      MIDDLE CEREBRAL ARTERY CIRCULATION:  The M1 segment and middle cerebral artery branches demonstrate normal flow-related enhancement      DISTAL VERTEBRAL ARTERIES:  Congenital left dominance--and normal variant  No flow-limiting stenoses  The posterior inferior cerebellar artery origins are normal       BASILAR ARTERY:  Normal      POSTERIOR CEREBRAL ARTERIES:  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  Both arteries demonstrate normal flow-related enhancement  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      IMPRESSION:     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  No occlusive vasculopathy or aneurysms demonstrated in the intracranial circulation  · A bilateral carotid duplex was completed on 12/05/2018 with the following results/impression:  CONCLUSION:     Impression  RIGHT:  There is <50% stenosis noted in the internal carotid artery  Plaque is  heterogenous and irregular  Vertebral artery flow is antegrade  There is no significant subclavian artery  disease  LEFT:  There is <50% stenosis noted in the internal carotid artery  Plaque is  heterogenous and irregular  Vertebral artery flow is antegrade  There is no significant subclavian artery  disease  There are no priors for comparison  Challenging exam due to pronounced snoring and inability to stay awake for  exam      Internal carotid artery stenosis determination by consensus criteria from:  Brett Norton et al  Carotid Artery Stenosis: Gray-Scale and Doppler US Diagnosis  - Society of [employer]ologists in [address] [place] [address], Radiology 2003;  [phone]       SIGNATURE:  Electronically Signed by: Surnider Driver OSKIN,MD,RVT on 2018-12-05 11:51:32 PM    · An echocardiogram was completed on 12/06/2018 with the following results:  SUMMARY     PROCEDURE INFORMATION:  This was a technically difficult study      LEFT VENTRICLE:  Size was normal   Systolic function was normal  Ejection fraction was estimated to be 60 %  There were no regional wall motion abnormalities  Wall thickness was at the upper limits of normal      RIGHT VENTRICLE:  The size was at the upper limits of normal      MITRAL VALVE:  There was mild annular calcification      AORTIC VALVE:  The valve was probably trileaflet  There was mild to moderate stenosis  Valve peak gradient was 25 95 mmHg  Valve mean gradient was 16 04 mmHg  Aortic valve area was 1 4 cm squared by the continuity equation      TRICUSPID VALVE:  There was mild regurgitation  Estimated peak PA pressure was 35 mmHg      PERICARDIUM:  A small pericardial effusion was identified circumferential to the heart  There was no evidence of hemodynamic compromise      HISTORY: PRIOR HISTORY: echo 10/2018, aortic stenosis     PROCEDURE: The procedure was performed at the bedside  This was a routine study  The transthoracic approach was used  The study included complete 2D imaging, M-mode, complete spectral Doppler, and color Doppler  The heart rate was 80 bpm,  at the start of the study  This was a technically difficult study      LEFT VENTRICLE: Size was normal  Systolic function was normal  Ejection fraction was estimated to be 60 %  There were no regional wall motion abnormalities  Wall thickness was at the upper limits of normal      RIGHT VENTRICLE: The size was at the upper limits of normal  Systolic function was normal  Wall thickness was normal      LEFT ATRIUM: Size was normal      RIGHT ATRIUM: Size was at the upper limits of normal      MITRAL VALVE: There was mild annular calcification   DOPPLER: There was no significant regurgitation      AORTIC VALVE: The valve was probably trileaflet  DOPPLER: There was mild to moderate stenosis  There was no significant regurgitation      TRICUSPID VALVE: DOPPLER: There was mild regurgitation  Estimated peak PA pressure was 35 mmHg      PULMONIC VALVE: Not well visualized      PERICARDIUM: A small pericardial effusion was identified circumferential to the heart  There was no evidence of hemodynamic compromise      AORTA: The root exhibited normal size      MEASUREMENT TABLES     DOPPLER MEASUREMENTS  Aortic valve   (Reference normals)  Peak gradient   25 95 mmHg   (--)  Mean gradient   16 04 mmHg   (--)  Valve area, cont   1 4 cm squared   (--)     SYSTEM MEASUREMENT TABLES     2D  %FS: 34 75 %  Ao Diam: 2 66 cm  EDV(Teich): 83 84 ml  EF(Teich): 64 23 %  ESV(Teich): 29 99 ml  IVSd: 1 24 cm  LA Diam: 3 08 cm  LVIDd: 4 32 cm  LVIDs: 2 82 cm  LVOT Diam: 1 93 cm  LVPWd: 1 01 cm  SV(Teich): 53 85 ml     CW  AV Env  Ti: 328 72 ms  AV VTI: 62 55 cm  AV Vmax: 2 57 m/s  AV Vmax: 2 55 m/s  AV Vmean: 1 9 m/s  AV maxP 51 mmHg  AV maxP 95 mmHg  AV meanP 04 mmHg  TR Vmax: 2 96 m/s  TR maxP 09 mmHg     PW  SHERMAN Vmax, Pt: 1 38 cm2  SHERMAN Vmax, Pt: 1 37 cm2  E' Sept: 0 07 m/s  E/E' Sept: 13 34  LVOT Vmax: 1 21 m/s  LVOT maxP 84 mmHg  MV A Martin: 1 15 m/s  MV Dec [place]: 6 71 m/s2  MV DecT: 133 78 ms  MV E Martin: 0 9 m/s  MV E/A Ratio: 0 78     [employer] Accredited Echocardiography Laboratory     Prepared and electronically signed by  Basia Drake MD  Signed 06-Dec-2018 10:36:00    · Aspirin 81 mg PO Qdaily  · High-intensity statin therapy was initiated with Atorvastatin 40 mg PO daily  · Continue telemetry monitoring to watch for any signs of atrial fibrillation/atrial flutter, which could cause an embolic CVA  · PT/OT       Dysphagia   Assessment & Plan    · The patient is more awake today  · Advance the patient to a pureed solid food consistency with honey thick liquids  · Aspiration precautions at all times  · I will have speech therapy re-evaluate the patient today     Visual disturbance   Assessment & Plan    · Continue the stroke pathway  · An MRI of the brain was completed on 12/06/2018 with the following results/impression:  FINDINGS:     BRAIN PARENCHYMA:  Mild patchy periventricular white matter FLAIR/T2 hyperintensity suggesting chronic microangiopathic change  Appearance is similar to October  No acute infarct  No parenchymal hemorrhage  No mass  Chronic calcifications again   demonstrated within the basal ganglia      VENTRICLES AND EXTRA-AXIAL SPACES:  Mild involutional volume loss  No hydrocephalus, herniation, or mass effect  No extra-axial or intraventricular hemorrhage      SELLA AND PITUITARY GLAND:  Normal      ORBITS:  Prior bilateral lens surgeries  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  No acute orbital findings      PARANASAL SINUSES:  Mild chronic mucosal disease in the maxillary, frontal, and sphenoid sinuses as well as and the anterior ethmoid air cells  No fluid levels  No mastoid effusions      VASCULATURE:  Evaluation of the major intracranial vasculature demonstrates appropriate flow voids  MRA from the same day is reported separately      CALVARIUM AND SKULL BASE:  Normal      EXTRACRANIAL SOFT TISSUES:  Normal      IMPRESSION:     1  No acute intracranial findings or significant change from prior  Specifically, no infarct      2   Mild chronic sinus mucosal disease  · An MRA of the head was completed on 12/06/2018 with the following impression/results:  FINDINGS:     IMAGE QUALITY:  Diagnostic      ANATOMY     INTERNAL CAROTID ARTERIES:  Luminal irregularity within the cavernous segment of the internal carotids suggests atheromatous disease, also seen on CT  No flow-limiting stenoses demonstrated  Normal ICA termini      ANTERIOR CIRCULATION:  Normal A1 segments  Normal anterior communicating artery    Normal flow-related enhancement of the anterior cerebral arteries      MIDDLE CEREBRAL ARTERY CIRCULATION:  The M1 segment and middle cerebral artery branches demonstrate normal flow-related enhancement      DISTAL VERTEBRAL ARTERIES:  Congenital left dominance--and normal variant  No flow-limiting stenoses  The posterior inferior cerebellar artery origins are normal       BASILAR ARTERY:  Normal      POSTERIOR CEREBRAL ARTERIES:  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  Both arteries demonstrate normal flow-related enhancement  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      IMPRESSION:     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  No occlusive vasculopathy or aneurysms demonstrated in the intracranial circulation  · A bilateral carotid duplex was completed on 12/05/2018 with the following results/impression:  CONCLUSION:     Impression  RIGHT:  There is <50% stenosis noted in the internal carotid artery  Plaque is  heterogenous and irregular  Vertebral artery flow is antegrade  There is no significant subclavian artery  disease  LEFT:  There is <50% stenosis noted in the internal carotid artery  Plaque is  heterogenous and irregular  Vertebral artery flow is antegrade  There is no significant subclavian artery  disease  There are no priors for comparison  Challenging exam due to pronounced snoring and inability to stay awake for  exam      Internal carotid artery stenosis determination by consensus criteria from:  Tushar Lopez et al  Carotid Artery Stenosis: Gray-Scale and Doppler US Diagnosis  - Society of Radiologists in [address] [place] [address], Radiology 2003;  [phone]       SIGNATURE:  Electronically Signed by: Moises Nino on 2018-12-05 11:51:32 PM    · An echocardiogram was completed on 12/06/2018 with the following results:  SUMMARY     PROCEDURE INFORMATION:  This was a technically difficult study      LEFT VENTRICLE:  Size was normal   Systolic function was normal  Ejection fraction was estimated to be 60 %  There were no regional wall motion abnormalities  Wall thickness was at the upper limits of normal      RIGHT VENTRICLE:  The size was at the upper limits of normal      MITRAL VALVE:  There was mild annular calcification      AORTIC VALVE:  The valve was probably trileaflet  There was mild to moderate stenosis  Valve peak gradient was 25 95 mmHg  Valve mean gradient was 16 04 mmHg  Aortic valve area was 1 4 cm squared by the continuity equation      TRICUSPID VALVE:  There was mild regurgitation  Estimated peak PA pressure was 35 mmHg      PERICARDIUM:  A small pericardial effusion was identified circumferential to the heart  There was no evidence of hemodynamic compromise      HISTORY: PRIOR HISTORY: echo 10/2018, aortic stenosis     PROCEDURE: The procedure was performed at the bedside  This was a routine study  The transthoracic approach was used  The study included complete 2D imaging, M-mode, complete spectral Doppler, and color Doppler  The heart rate was 80 bpm,  at the start of the study  This was a technically difficult study      LEFT VENTRICLE: Size was normal  Systolic function was normal  Ejection fraction was estimated to be 60 %  There were no regional wall motion abnormalities  Wall thickness was at the upper limits of normal      RIGHT VENTRICLE: The size was at the upper limits of normal  Systolic function was normal  Wall thickness was normal      LEFT ATRIUM: Size was normal      RIGHT ATRIUM: Size was at the upper limits of normal      MITRAL VALVE: There was mild annular calcification  DOPPLER: There was no significant regurgitation      AORTIC VALVE: The valve was probably trileaflet  DOPPLER: There was mild to moderate stenosis  There was no significant regurgitation      TRICUSPID VALVE: DOPPLER: There was mild regurgitation   Estimated peak PA pressure was 35 mmHg      PULMONIC VALVE: Not well visualized      PERICARDIUM: A small pericardial effusion was identified circumferential to the heart  There was no evidence of hemodynamic compromise      AORTA: The root exhibited normal size      MEASUREMENT TABLES     DOPPLER MEASUREMENTS  Aortic valve   (Reference normals)  Peak gradient   25 95 mmHg   (--)  Mean gradient   16 04 mmHg   (--)  Valve area, cont   1 4 cm squared   (--)     SYSTEM MEASUREMENT TABLES     2D  %FS: 34 75 %  Ao Diam: 2 66 cm  EDV(Teich): 83 84 ml  EF(Teich): 64 23 %  ESV(Teich): 29 99 ml  IVSd: 1 24 cm  LA Diam: 3 08 cm  LVIDd: 4 32 cm  LVIDs: 2 82 cm  LVOT Diam: 1 93 cm  LVPWd: 1 01 cm  SV(Teich): 53 85 ml     CW  AV Env  Ti: 328 72 ms  AV VTI: 62 55 cm  AV Vmax: 2 57 m/s  AV Vmax: 2 55 m/s  AV Vmean: 1 9 m/s  AV maxP 51 mmHg  AV maxP 95 mmHg  AV meanP 04 mmHg  TR Vmax: 2 96 m/s  TR maxP 09 mmHg     PW  SHERMAN Vmax, Pt: 1 38 cm2  SHERMAN Vmax, Pt: 1 37 cm2  E' Sept: 0 07 m/s  E/E' Sept: 13 34  LVOT Vmax: 1 21 m/s  LVOT maxP 84 mmHg  MV A Martin: 1 15 m/s  MV Dec [place]: 6 71 m/s2  MV DecT: 133 78 ms  MV E Martin: 0 9 m/s  MV E/A Ratio: 0 78     [employer] Accredited Echocardiography Laboratory     Prepared and electronically signed by  Lorraine Rock MD  Signed 06-Dec-2018 10:36:00    · Aspirin 81 mg PO Qdaily  · High-intensity statin therapy was initiated with Atorvastatin 40 mg PO daily  · Continue telemetry monitoring to watch for any signs of atrial fibrillation/atrial flutter, which could cause an embolic CVA  · PT/OT     Hypernatremia   Assessment & Plan    · Change IV fluids to 1/2 NSS IV fluids at 75 ml/hr  · Follow the sodium level     Elevated TSH   Assessment & Plan    Results for Tennie Krabbe (MRN [id number]) as of 2018 10:23   Ref   Range 2018 05:02   TSH 3RD GENERATON Latest Ref Range: 0 358 - 3 740 uIU/mL 5 787 (H)     · Increase levothyroxine to 100 micrograms PO Qdaily in the early morning  · Recheck a TSH level in 4-6 weeks Facial droop   Assessment & Plan    · Continue the stroke pathway  · An MRI of the brain was completed on 12/06/2018 with the following results/impression:  FINDINGS:     BRAIN PARENCHYMA:  Mild patchy periventricular white matter FLAIR/T2 hyperintensity suggesting chronic microangiopathic change  Appearance is similar to October  No acute infarct  No parenchymal hemorrhage  No mass  Chronic calcifications again   demonstrated within the basal ganglia      VENTRICLES AND EXTRA-AXIAL SPACES:  Mild involutional volume loss  No hydrocephalus, herniation, or mass effect  No extra-axial or intraventricular hemorrhage      SELLA AND PITUITARY GLAND:  Normal      ORBITS:  Prior bilateral lens surgeries  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  No acute orbital findings      PARANASAL SINUSES:  Mild chronic mucosal disease in the maxillary, frontal, and sphenoid sinuses as well as and the anterior ethmoid air cells  No fluid levels  No mastoid effusions      VASCULATURE:  Evaluation of the major intracranial vasculature demonstrates appropriate flow voids  MRA from the same day is reported separately      CALVARIUM AND SKULL BASE:  Normal      EXTRACRANIAL SOFT TISSUES:  Normal      IMPRESSION:     1  No acute intracranial findings or significant change from prior  Specifically, no infarct      2   Mild chronic sinus mucosal disease  · An MRA of the head was completed on 12/06/2018 with the following impression/results:  FINDINGS:     IMAGE QUALITY:  Diagnostic      ANATOMY     INTERNAL CAROTID ARTERIES:  Luminal irregularity within the cavernous segment of the internal carotids suggests atheromatous disease, also seen on CT  No flow-limiting stenoses demonstrated  Normal ICA termini      ANTERIOR CIRCULATION:  Normal A1 segments  Normal anterior communicating artery    Normal flow-related enhancement of the anterior cerebral arteries      MIDDLE CEREBRAL ARTERY CIRCULATION: The M1 segment and middle cerebral artery branches demonstrate normal flow-related enhancement      DISTAL VERTEBRAL ARTERIES:  Congenital left dominance--and normal variant  No flow-limiting stenoses  The posterior inferior cerebellar artery origins are normal       BASILAR ARTERY:  Normal      POSTERIOR CEREBRAL ARTERIES:  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  Both arteries demonstrate normal flow-related enhancement  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      IMPRESSION:     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  No occlusive vasculopathy or aneurysms demonstrated in the intracranial circulation  · A bilateral carotid duplex was completed on 12/05/2018 with the following results/impression:  CONCLUSION:     Impression  RIGHT:  There is <50% stenosis noted in the internal carotid artery  Plaque is  heterogenous and irregular  Vertebral artery flow is antegrade  There is no significant subclavian artery  disease  LEFT:  There is <50% stenosis noted in the internal carotid artery  Plaque is  heterogenous and irregular  Vertebral artery flow is antegrade  There is no significant subclavian artery  disease  There are no priors for comparison  Challenging exam due to pronounced snoring and inability to stay awake for  exam      Internal carotid artery stenosis determination by consensus criteria from:  Trinity Rubio et al  Carotid Artery Stenosis: Gray-Scale and Doppler US Diagnosis  - Society of Radiologists in [address] [place] [address], Radiology 2003;  [phone]       SIGNATURE:  Electronically Signed by: Ronald Hopson on 2018-12-05 11:51:32 PM    · An echocardiogram was completed on 12/06/2018 with the following results:  SUMMARY     PROCEDURE INFORMATION:  This was a technically difficult study      LEFT VENTRICLE:  Size was normal   Systolic function was normal  Ejection fraction was estimated to be 60 %   There were no regional wall motion abnormalities  Wall thickness was at the upper limits of normal      RIGHT VENTRICLE:  The size was at the upper limits of normal      MITRAL VALVE:  There was mild annular calcification      AORTIC VALVE:  The valve was probably trileaflet  There was mild to moderate stenosis  Valve peak gradient was 25 95 mmHg  Valve mean gradient was 16 04 mmHg  Aortic valve area was 1 4 cm squared by the continuity equation      TRICUSPID VALVE:  There was mild regurgitation  Estimated peak PA pressure was 35 mmHg      PERICARDIUM:  A small pericardial effusion was identified circumferential to the heart  There was no evidence of hemodynamic compromise      HISTORY: PRIOR HISTORY: echo 10/2018, aortic stenosis     PROCEDURE: The procedure was performed at the bedside  This was a routine study  The transthoracic approach was used  The study included complete 2D imaging, M-mode, complete spectral Doppler, and color Doppler  The heart rate was 80 bpm,  at the start of the study  This was a technically difficult study      LEFT VENTRICLE: Size was normal  Systolic function was normal  Ejection fraction was estimated to be 60 %  There were no regional wall motion abnormalities  Wall thickness was at the upper limits of normal      RIGHT VENTRICLE: The size was at the upper limits of normal  Systolic function was normal  Wall thickness was normal      LEFT ATRIUM: Size was normal      RIGHT ATRIUM: Size was at the upper limits of normal      MITRAL VALVE: There was mild annular calcification  DOPPLER: There was no significant regurgitation      AORTIC VALVE: The valve was probably trileaflet  DOPPLER: There was mild to moderate stenosis  There was no significant regurgitation      TRICUSPID VALVE: DOPPLER: There was mild regurgitation   Estimated peak PA pressure was 35 mmHg      PULMONIC VALVE: Not well visualized      PERICARDIUM: A small pericardial effusion was identified circumferential to the heart  There was no evidence of hemodynamic compromise      AORTA: The root exhibited normal size      MEASUREMENT TABLES     DOPPLER MEASUREMENTS  Aortic valve   (Reference normals)  Peak gradient   25 95 mmHg   (--)  Mean gradient   16 04 mmHg   (--)  Valve area, cont   1 4 cm squared   (--)     SYSTEM MEASUREMENT TABLES     2D  %FS: 34 75 %  Ao Diam: 2 66 cm  EDV(Teich): 83 84 ml  EF(Teich): 64 23 %  ESV(Teich): 29 99 ml  IVSd: 1 24 cm  LA Diam: 3 08 cm  LVIDd: 4 32 cm  LVIDs: 2 82 cm  LVOT Diam: 1 93 cm  LVPWd: 1 01 cm  SV(Teich): 53 85 ml     CW  AV Env  Ti: 328 72 ms  AV VTI: 62 55 cm  AV Vmax: 2 57 m/s  AV Vmax: 2 55 m/s  AV Vmean: 1 9 m/s  AV maxP 51 mmHg  AV maxP 95 mmHg  AV meanP 04 mmHg  TR Vmax: 2 96 m/s  TR maxP 09 mmHg     PW  SHERMAN Vmax, Pt: 1 38 cm2  SHERMAN Vmax, Pt: 1 37 cm2  E' Sept: 0 07 m/s  E/E' Sept: 13 34  LVOT Vmax: 1 21 m/s  LVOT maxP 84 mmHg  MV A Martin: 1 15 m/s  MV Dec [place]: 6 71 m/s2  MV DecT: 133 78 ms  MV E Martin: 0 9 m/s  MV E/A Ratio: 0 78     [facility] Accredited Echocardiography Laboratory     Prepared and electronically signed by  Kevin Herrera MD  Signed 06-Dec-2018 10:36:00    · Aspirin 81 mg PO Qdaily  · High-intensity statin therapy was initiated with Atorvastatin 40 mg PO daily  · Continue telemetry monitoring to watch for any signs of atrial fibrillation/atrial flutter, which could cause an embolic CVA  · PT/OT     Acute cystitis with hematuria   Assessment & Plan    · Continue IV ceftriaxone  · Await the urine culture results     Pericardial effusion   Assessment & Plan    · No evidence of hemodynamic compromise on echocardiogram  · Recheck an echocardiogram in 1 month to monitor the pericardial effusion     Pulmonary hypertension (HCC)   Assessment & Plan    · Outpatient follow-up with Pulmonology     Aortic valve stenosis   Assessment & Plan    · Outpatient follow-up with Cardiology     Right bundle branch block   Assessment & Plan    · Outpatient follow-up with Cardiology     CKD (chronic kidney disease) stage 4, GFR 15-29 ml/min ([facility])   Assessment & Plan    · Baseline creatinine of 1 1-1 3  · Mild elevation in creatinine today  · Change IV fluids to 1/2 NSS at 75 ml/hr  · Avoid all nephrotoxic agents  · Continue serial laboratory testing to monitor her renal function and electrolytes     Macrocytic anemia   Assessment & Plan    · Follow the CBC  · Transfuse for hemoglobin less than 7  Results for Teo Vang (MRN [id number]) as of 2018 10:00   Ref  Range 2018 05:02   Folate Latest Ref Range: 3 1 - 17 5 ng/mL >20 0 (H)   Vitamin B-12 Latest Ref Range: 100 - 900 pg/mL 1,342 (H)        Acquired hypothyroidism   Assessment & Plan    Results for Teo Vang (MRN [id number]) as of 2018 10:23   Ref  Range 2018 05:02   TSH 3RD GENERATON Latest Ref Range: 0 358 - 3 740 uIU/mL 5 787 (H)     · Increase levothyroxine to 100 micrograms PO Qdaily in the early morning  · Recheck a TSH level in 4-6 weeks         VTE Pharmacologic Prophylaxis:   Pharmacologic: Heparin  Mechanical VTE Prophylaxis in Place: Yes    Patient Centered Rounds: I have performed bedside rounds with nursing staff today  Time Spent for Care: 20 minutes  More than 50% of total time spent on counseling and coordination of care as described above  Current Length of Stay: 2 day(s)    Current Patient Status: Inpatient   Certification Statement: The patient will continue to require additional inpatient hospital stay due to the need for IV antibiotics, the need for close neurologic status monitoring, and the need for a dysphagia evaluation  Code Status: Level 3 - DNAR and DNI      Subjective: The patient was seen and examined  The patient is more awake and alert today  She complains of being thirsty      Objective:     Vitals:   Temp (24hrs), Av 9 °F (37 2 °C), Min:98 3 °F (36 8 °C), Max:99 4 °F (37 4 °C)    Temp:  [98 3 °F (36 8 °C)-99 4 °F (37 4 °C)] 98 7 °F (37 1 °C)  HR:  [81-88] 82  Resp:  [18-21] 20  BP: (101-127)/(43-60) 125/59  SpO2:  [91 %-96 %] 94 %  Body mass index is 28 78 kg/m²  Input and Output Summary (last 24 hours): Intake/Output Summary (Last 24 hours) at 12/07/18 1017  Last data filed at 12/07/18 0900   Gross per 24 hour   Intake          1253 33 ml   Output                0 ml   Net          1253 33 ml       Physical Exam:     Physical Exam  General:  NAD, more awake and alert today  HEENT:  NC/AT, mucous membranes dry, lips are dry and crackles  Neck:  Supple, No JVP elevation  CV:  + S1, + S2, RRR  Pulm:  Lung fields are CTA bilaterally  Abd:  Soft, Non-tender, Non-distended  Ext:  No clubbing/cyanosis/edema  Skin:  No rashes  Neuro:  Right-sided facial droop is still present  Improving expressive aphasia      Additional Data:     Labs:      Results from last 7 days  Lab Units 12/07/18  0502 12/06/18  0502   WBC Thousand/uL 7 20 5 63   HEMOGLOBIN g/dL 10 1* 9 9*   HEMATOCRIT % 33 0* 32 3*   PLATELETS Thousands/uL 308 285   NEUTROS PCT %  --  66   LYMPHS PCT %  --  22   MONOS PCT %  --  6   EOS PCT %  --  5       Results from last 7 days  Lab Units 12/07/18  0502 12/06/18  0502   SODIUM mmol/L 149* 144   POTASSIUM mmol/L 4 4 4 5   CHLORIDE mmol/L 112* 110*   CO2 mmol/L 29 30   BUN mg/dL 36* 37*   CREATININE mg/dL 1 41* 1 21   ANION GAP mmol/L 8 4   CALCIUM mg/dL 7 9* 7 8*   ALBUMIN g/dL  --  2 3*   TOTAL BILIRUBIN mg/dL  --  0 10*   ALK PHOS U/L  --  71   ALT U/L  --  19   AST U/L  --  16   GLUCOSE RANDOM mg/dL 65 57*       Results from last 7 days  Lab Units 12/05/18  1131   INR  0 98           Results from last 7 days  Lab Units 12/06/18  0502   HEMOGLOBIN A1C % 5 2               * I Have Reviewed All Lab Data Listed Above  * Additional Pertinent Lab Tests Reviewed:  Romaine 66 Admission Reviewed        Recent Cultures (last 7 days):       Results from last 7 days  Lab Units 12/05/18  1715   URINE CULTURE  Culture results to follow  Last 24 Hours Medication List:     Current Facility-Administered Medications:  acetaminophen 650 mg Oral Q6H PRN Mati Rodriguez DO    aspirin 81 mg Oral Daily Mati Rodriguez DO    atorvastatin 40 mg Oral QPM Mati Rodriguez DO    cefTRIAXone 1,000 mg Intravenous Q24H Mati Rodriguez DO Last Rate: 1,000 mg (12/06/18 1842)   collagenase  Topical BID Mati Rodriguez DO    ferrous sulfate 325 mg Oral BID With Meals Mati Rodriguez DO    heparin (porcine) 5,000 Units Subcutaneous [facility] Endy Tapia DO    levothyroxine 100 mcg Oral Early Morning Mati Rodriguez DO    multivitamin-minerals 1 tablet Oral Daily Endy Tapia DO    ondansetron 4 mg Intravenous Q4H PRN Mati Rodriguez DO    sodium chloride 75 mL/hr Intravenous Continuous Mati Rodriguez DO         Today, Patient Was Seen By: Mati Rodriguez DO    ** Please Note: Dictation voice to text software may have been used in the creation of this document   **

## 2018-12-07 NOTE — PLAN OF CARE

## 2018-12-07 NOTE — SOCIAL WORK
As per physician in inner disciplinary meeting physician said pt would be ready for discharge on Saturday   I called Laura Charles at [employer] and notified her of same    Paperwork sent to [employer]

## 2018-12-07 NOTE — ASSESSMENT & PLAN NOTE
· No evidence of hemodynamic compromise on echocardiogram  · Recheck an echocardiogram in 1 month to monitor the pericardial effusion

## 2018-12-07 NOTE — ASSESSMENT & PLAN NOTE
· Follow the CBC  · Transfuse for hemoglobin less than 7  Results for Gayla Cooley (MRN [id number]) as of 12/7/2018 10:00   Ref   Range 12/6/2018 05:02   Folate Latest Ref Range: 3 1 - 17 5 ng/mL >20 0 (H)   Vitamin B-12 Latest Ref Range: 100 - 900 pg/mL 1,342 (H)

## 2018-12-07 NOTE — SOCIAL WORK
Pt was authorized to return to [facility]  Auth number is [id number]  Lianna Bernal was notified of same

## 2018-12-07 NOTE — ASSESSMENT & PLAN NOTE
· Also with expressive aphasia  · Possible secondary to acute cystitis  · Continue the stroke pathway  · An MRI of the brain was completed on 12/06/2018 with the following results/impression:  FINDINGS:     BRAIN PARENCHYMA:  Mild patchy periventricular white matter FLAIR/T2 hyperintensity suggesting chronic microangiopathic change  Appearance is similar to October  No acute infarct  No parenchymal hemorrhage  No mass  Chronic calcifications again   demonstrated within the basal ganglia      VENTRICLES AND EXTRA-AXIAL SPACES:  Mild involutional volume loss  No hydrocephalus, herniation, or mass effect  No extra-axial or intraventricular hemorrhage      SELLA AND PITUITARY GLAND:  Normal      ORBITS:  Prior bilateral lens surgeries  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  No acute orbital findings      PARANASAL SINUSES:  Mild chronic mucosal disease in the maxillary, frontal, and sphenoid sinuses as well as and the anterior ethmoid air cells  No fluid levels  No mastoid effusions      VASCULATURE:  Evaluation of the major intracranial vasculature demonstrates appropriate flow voids  MRA from the same day is reported separately      CALVARIUM AND SKULL BASE:  Normal      EXTRACRANIAL SOFT TISSUES:  Normal      IMPRESSION:     1  No acute intracranial findings or significant change from prior  Specifically, no infarct      2   Mild chronic sinus mucosal disease  · An MRA of the head was completed on 12/06/2018 with the following impression/results:  FINDINGS:     IMAGE QUALITY:  Diagnostic      ANATOMY     INTERNAL CAROTID ARTERIES:  Luminal irregularity within the cavernous segment of the internal carotids suggests atheromatous disease, also seen on CT  No flow-limiting stenoses demonstrated  Normal ICA termini      ANTERIOR CIRCULATION:  Normal A1 segments  Normal anterior communicating artery    Normal flow-related enhancement of the anterior cerebral arteries      MIDDLE CEREBRAL ARTERY CIRCULATION:  The M1 segment and middle cerebral artery branches demonstrate normal flow-related enhancement      DISTAL VERTEBRAL ARTERIES:  Congenital left dominance--and normal variant  No flow-limiting stenoses  The posterior inferior cerebellar artery origins are normal       BASILAR ARTERY:  Normal      POSTERIOR CEREBRAL ARTERIES:  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  Both arteries demonstrate normal flow-related enhancement  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      IMPRESSION:     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  No occlusive vasculopathy or aneurysms demonstrated in the intracranial circulation  · A bilateral carotid duplex was completed on 12/05/2018 with the following results/impression:  CONCLUSION:     Impression  RIGHT:  There is <50% stenosis noted in the internal carotid artery  Plaque is  heterogenous and irregular  Vertebral artery flow is antegrade  There is no significant subclavian artery  disease  LEFT:  There is <50% stenosis noted in the internal carotid artery  Plaque is  heterogenous and irregular  Vertebral artery flow is antegrade  There is no significant subclavian artery  disease  There are no priors for comparison  Challenging exam due to pronounced snoring and inability to stay awake for  exam      Internal carotid artery stenosis determination by consensus criteria from:  Pati Penn , et al  Carotid Artery Stenosis: Gray-Scale and Doppler US Diagnosis  - Society of Radiologists in [address] [place] [address], Radiology 2003;  [phone]       SIGNATURE:  Electronically Signed by: Prasanna Faye on 2018-12-05 11:51:32 PM    · An echocardiogram was completed on 12/06/2018 with the following results:  SUMMARY     PROCEDURE INFORMATION:  This was a technically difficult study      LEFT VENTRICLE:  Size was normal   Systolic function was normal  Ejection fraction was estimated to be 60 %  There were no regional wall motion abnormalities  Wall thickness was at the upper limits of normal      RIGHT VENTRICLE:  The size was at the upper limits of normal      MITRAL VALVE:  There was mild annular calcification      AORTIC VALVE:  The valve was probably trileaflet  There was mild to moderate stenosis  Valve peak gradient was 25 95 mmHg  Valve mean gradient was 16 04 mmHg  Aortic valve area was 1 4 cm squared by the continuity equation      TRICUSPID VALVE:  There was mild regurgitation  Estimated peak PA pressure was 35 mmHg      PERICARDIUM:  A small pericardial effusion was identified circumferential to the heart  There was no evidence of hemodynamic compromise      HISTORY: PRIOR HISTORY: echo 10/2018, aortic stenosis     PROCEDURE: The procedure was performed at the bedside  This was a routine study  The transthoracic approach was used  The study included complete 2D imaging, M-mode, complete spectral Doppler, and color Doppler  The heart rate was 80 bpm,  at the start of the study  This was a technically difficult study      LEFT VENTRICLE: Size was normal  Systolic function was normal  Ejection fraction was estimated to be 60 %  There were no regional wall motion abnormalities  Wall thickness was at the upper limits of normal      RIGHT VENTRICLE: The size was at the upper limits of normal  Systolic function was normal  Wall thickness was normal      LEFT ATRIUM: Size was normal      RIGHT ATRIUM: Size was at the upper limits of normal      MITRAL VALVE: There was mild annular calcification  DOPPLER: There was no significant regurgitation      AORTIC VALVE: The valve was probably trileaflet  DOPPLER: There was mild to moderate stenosis  There was no significant regurgitation      TRICUSPID VALVE: DOPPLER: There was mild regurgitation   Estimated peak PA pressure was 35 mmHg      PULMONIC VALVE: Not well visualized      PERICARDIUM: A small pericardial effusion was identified circumferential to the heart  There was no evidence of hemodynamic compromise      AORTA: The root exhibited normal size      MEASUREMENT TABLES     DOPPLER MEASUREMENTS  Aortic valve   (Reference normals)  Peak gradient   25 95 mmHg   (--)  Mean gradient   16 04 mmHg   (--)  Valve area, cont   1 4 cm squared   (--)     SYSTEM MEASUREMENT TABLES     2D  %FS: 34 75 %  Ao Diam: 2 66 cm  EDV(Teich): 83 84 ml  EF(Teich): 64 23 %  ESV(Teich): 29 99 ml  IVSd: 1 24 cm  LA Diam: 3 08 cm  LVIDd: 4 32 cm  LVIDs: 2 82 cm  LVOT Diam: 1 93 cm  LVPWd: 1 01 cm  SV(Teich): 53 85 ml     CW  AV Env  Ti: 328 72 ms  AV VTI: 62 55 cm  AV Vmax: 2 57 m/s  AV Vmax: 2 55 m/s  AV Vmean: 1 9 m/s  AV maxP 51 mmHg  AV maxP 95 mmHg  AV meanP 04 mmHg  TR Vmax: 2 96 m/s  TR maxP 09 mmHg     PW  SHERMAN Vmax, Pt: 1 38 cm2  SHERMAN Vmax, Pt: 1 37 cm2  E' Sept: 0 07 m/s  E/E' Sept: 13 34  LVOT Vmax: 1 21 m/s  LVOT maxP 84 mmHg  MV A Martin: 1 15 m/s  MV Dec [place]: 6 71 m/s2  MV DecT: 133 78 ms  MV E Martin: 0 9 m/s  MV E/A Ratio: 0 78     [facility]     Prepared and electronically signed by  Lizbeth Palomares MD  Signed 06-Dec-2018 10:36:00    · Aspirin 81 mg PO Qdaily  · High-intensity statin therapy was initiated with Atorvastatin 40 mg PO daily  · Continue telemetry monitoring to watch for any signs of atrial fibrillation/atrial flutter, which could cause an embolic CVA  · PT/OT

## 2018-12-07 NOTE — ASSESSMENT & PLAN NOTE
· Baseline creatinine of 1 1-1 3  · Mild elevation in creatinine today  · Change IV fluids to 1/2 NSS at 75 ml/hr  · Avoid all nephrotoxic agents  · Continue serial laboratory testing to monitor her renal function and electrolytes

## 2018-12-07 NOTE — OCCUPATIONAL THERAPY NOTE
Occupational Therapy Evaluation     Patient Name: Maye NOVAK Date: 12/7/2018  Problem List  Patient Active Problem List   Diagnosis    Chronic respiratory failure with hypoxia (HCC)    Acquired hypothyroidism    Acute encephalopathy    Generalized weakness    Presbycusis    Slow transit constipation    Acute cystitis with hematuria    Metabolic encephalopathy    Facial droop    Visual disturbance    Macrocytic anemia    CKD (chronic kidney disease) stage 4, GFR 15-29 ml/min (HCC)    Right bundle branch block    Elevated TSH     Past Medical History  Past Medical History:   Diagnosis Date    Anemia     Arthritis     Cancer ([address] )     breast    Disease of thyroid gland     Hyperlipidemia     IBS (irritable bowel syndrome)     Overactive bladder     Peripheral neuropathy     Polio     Poliomyelitis     Renal disorder     Rhabdomyolysis     UTI (urinary tract infection)     Ventral hernia      Past Surgical History  Past Surgical History:   Procedure Laterality Date    APPENDECTOMY      BREAST SURGERY      right mastectomy           12/07/18 0833   Note Type   Note type Eval/Treat   Restrictions/Precautions   Weight Bearing Precautions Per Order No   Other Precautions Telemetry;O2;Fall Risk; Chair Alarm; Bed Alarm   Pain Assessment   Pain Assessment No/denies pain   Home Living   Type of Home SNF   Additional Comments pt reports she does not get OOB and is in bed most of the day   Prior Function   Level of [place] Needs assistance with ADLs and functional mobility; Needs assistance with IADLs   Lives With Facility staff   Receives Help From Personal care attendant   ADL Assistance Needs assistance   IADLs Needs assistance   Falls in the last 6 months 0   Psychosocial   Psychosocial (WDL) X   Patient Behaviors/Mood Flat affect   Subjective   Subjective "I don't think I can get OOB"   ADL   Where Assessed Supine, bed   LB Dressing Assistance 1  Total Assistance   LB Dressing Deficit Don/doff R sock; Don/doff L sock   Additional Comments pt required total (A) to don socks this date   Bed Mobility   Rolling L 2  Maximal assistance   Additional items Assist x 2   Supine to Sit 2  Maximal assistance   Additional items Assist x 2;Bedrails;Verbal cues;LE management   Sit to Supine (seated in chair at end of session)   Additional Comments pt maintained sitting balance with min (A) and then able to perform sitting balance with (S) level thereafter during theraputic exercises    Transfers   Sit to Stand 2  Maximal assistance   Additional items Assist x 2; Increased time required;Verbal cues  (RW)   Stand to Sit 2  Maximal assistance   Additional items Assist x 2;Armrests; Increased time required;Verbal cues  (RW)   Stand pivot 2  Maximal assistance   Additional items Assist x 2; Increased time required;Verbal cues   Additional Comments pt unable to stand with use of RW; performed max (A) x2 stand pivot transfer to chair this date; pt on 3L O2 throughout session with minimal SOB noted throughout    Functional Mobility   Additional Comments pt is unable to advance forward at this time   Balance   Static Sitting Fair   Dynamic Sitting Fair   Static Standing Poor +   Activity Tolerance   Activity Tolerance Patient limited by fatigue;Patient limited by pain   RUE Assessment   RUE Assessment X  (3/5 grossly; WFL PROM)   LUE Assessment   LUE Assessment X  (3/5 grossly; WFL PROM)   Hand Function   Gross Motor Coordination Impaired   Fine Motor Coordination Impaired   Sensation   Light Touch No apparent deficits   Sharp/Dull No apparent deficits   Cognition   Overall Cognitive Status Impaired   Arousal/Participation Alert; Cooperative   Attention Attends with cues to redirect   Orientation Level Oriented to person;Oriented to place; Disoriented to time;Disoriented to situation   Memory Unable to assess   Following Commands Follows one step commands with increased time or repetition   Comments [facility]   Limitation Decreased ADL status; Decreased UE strength;Decreased Safe judgement during ADL;Decreased cognition;Decreased endurance;Decreased self-care trans;Decreased high-level ADLs; Decreased UE ROM   Assessment Pt is a 80 y o  female seen for OT evaluation s/p admit to [facility] on 12/5/2018 w/ Acute encephalopathy  Comorbidities affecting pt's functional performance at time of assessment include: HTN, obesity, limited hearing, limited cognition, dementia and facial droop, decreased mobility  Personal factors affecting pt at time of IE include:steps to enter environment, difficulty performing ADLS, difficulty performing IADLS , limited insight into deficits, flat affect, decreased initiation and engagement  and health management   Prior to admission, pt was (A) with ADL/IADL performance; pt reports she does not get OOB  Upon evaluation: Pt requires max (A) x2 with no device for stand pivot transfer 2* the following deficits impacting occupational performance: weakness, decreased ROM, decreased strength, decreased balance, decreased tolerance, impaired attention, impaired initiation, impaired memory, impaired sequencing, impaired problem solving, decreased safety awareness, increased pain and decreased coping skills  Pt to benefit from continued skilled OT tx while in the hospital to address deficits as defined above and maximize level of functional independence w ADL's and functional mobility  Occupational Performance areas to address include: grooming and bed mobility, stand pivot transfers  From OT standpoint, recommendation at time of d/c would be return to SNF upon discharge for 24 hr (A) and (S)       Goals   Patient Goals to feel better   Short Term Goal  pt will perform and tolerate UE strengthening exercises while seated in chair or EOB   Long Term Goal #1 pt will increase independence with bed mobility to min (A) x2 level with decreased cues    Long Term Goal #2 pt will demonstrate UB grooming tasks while seated EOB or in chair at min (A) level    Long Term Goal pt will increase independence with stand pivot transfer to min (A) x2 for OOB activity to chair    Plan   Treatment Interventions ADL retraining;Functional transfer training;UE strengthening/ROM; Endurance training;Cognitive reorientation;Patient/family training; Activityengagement   Goal Expiration Date 12/21/18   OT Frequency 3-5x/wk   Recommendation   OT Discharge Recommendation 24 hour supervision/assist   Barthel Index   Feeding 0   Bathing 0   Grooming Score 0   Dressing Score 0   Bladder Score 5   Bowels Score 5   Toilet Use Score 5   Transfers (Bed/Chair) Score 5   Mobility (Level Surface) Score 0   Stairs Score 0   Barthel Index Score 20     Pt will benefit from continued OT services in order to maximize (I) c ADL performance, stand pivot transfers for OOB activty, and improve overall endurance/strength required to complete functional tasks in preparation for d/c  Pt left seated in chair at end of session; all needs within reach; all lines intact; scds connected and turned on

## 2018-12-07 NOTE — ASSESSMENT & PLAN NOTE
· Continue the stroke pathway  · An MRI of the brain was completed on 12/06/2018 with the following results/impression:  FINDINGS:     BRAIN PARENCHYMA:  Mild patchy periventricular white matter FLAIR/T2 hyperintensity suggesting chronic microangiopathic change  Appearance is similar to October  No acute infarct  No parenchymal hemorrhage  No mass  Chronic calcifications again   demonstrated within the basal ganglia      VENTRICLES AND EXTRA-AXIAL SPACES:  Mild involutional volume loss  No hydrocephalus, herniation, or mass effect  No extra-axial or intraventricular hemorrhage      SELLA AND PITUITARY GLAND:  Normal      ORBITS:  Prior bilateral lens surgeries  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  No acute orbital findings      PARANASAL SINUSES:  Mild chronic mucosal disease in the maxillary, frontal, and sphenoid sinuses as well as and the anterior ethmoid air cells  No fluid levels  No mastoid effusions      VASCULATURE:  Evaluation of the major intracranial vasculature demonstrates appropriate flow voids  MRA from the same day is reported separately      CALVARIUM AND SKULL BASE:  Normal      EXTRACRANIAL SOFT TISSUES:  Normal      IMPRESSION:     1  No acute intracranial findings or significant change from prior  Specifically, no infarct      2   Mild chronic sinus mucosal disease  · An MRA of the head was completed on 12/06/2018 with the following impression/results:  FINDINGS:     IMAGE QUALITY:  Diagnostic      ANATOMY     INTERNAL CAROTID ARTERIES:  Luminal irregularity within the cavernous segment of the internal carotids suggests atheromatous disease, also seen on CT  No flow-limiting stenoses demonstrated  Normal ICA termini      ANTERIOR CIRCULATION:  Normal A1 segments  Normal anterior communicating artery    Normal flow-related enhancement of the anterior cerebral arteries      MIDDLE CEREBRAL ARTERY CIRCULATION:  The M1 segment and middle cerebral artery branches demonstrate normal flow-related enhancement      DISTAL VERTEBRAL ARTERIES:  Congenital left dominance--and normal variant  No flow-limiting stenoses  The posterior inferior cerebellar artery origins are normal       BASILAR ARTERY:  Normal      POSTERIOR CEREBRAL ARTERIES:  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  Both arteries demonstrate normal flow-related enhancement  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      IMPRESSION:     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  No occlusive vasculopathy or aneurysms demonstrated in the intracranial circulation  · A bilateral carotid duplex was completed on 12/05/2018 with the following results/impression:  CONCLUSION:     Impression  RIGHT:  There is <50% stenosis noted in the internal carotid artery  Plaque is  heterogenous and irregular  Vertebral artery flow is antegrade  There is no significant subclavian artery  disease  LEFT:  There is <50% stenosis noted in the internal carotid artery  Plaque is  heterogenous and irregular  Vertebral artery flow is antegrade  There is no significant subclavian artery  disease  There are no priors for comparison  Challenging exam due to pronounced snoring and inability to stay awake for  exam      Internal carotid artery stenosis determination by consensus criteria from:  Aldo Flores , et al  Carotid Artery Stenosis: Gray-Scale and Doppler US Diagnosis  - [employer] in [address] [place] [address], Radiology 2003;  [phone]  SIGNATURE:  Electronically Signed by: Marilyn Murphy on 2018-12-05 11:51:32 PM    · An echocardiogram was completed on 12/06/2018 with the following results:  SUMMARY     PROCEDURE INFORMATION:  This was a technically difficult study      LEFT VENTRICLE:  Size was normal   Systolic function was normal  Ejection fraction was estimated to be 60 %    There were no regional wall motion abnormalities  Wall thickness was at the upper limits of normal      RIGHT VENTRICLE:  The size was at the upper limits of normal      MITRAL VALVE:  There was mild annular calcification      AORTIC VALVE:  The valve was probably trileaflet  There was mild to moderate stenosis  Valve peak gradient was 25 95 mmHg  Valve mean gradient was 16 04 mmHg  Aortic valve area was 1 4 cm squared by the continuity equation      TRICUSPID VALVE:  There was mild regurgitation  Estimated peak PA pressure was 35 mmHg      PERICARDIUM:  A small pericardial effusion was identified circumferential to the heart  There was no evidence of hemodynamic compromise      HISTORY: PRIOR HISTORY: echo 10/2018, aortic stenosis     PROCEDURE: The procedure was performed at the bedside  This was a routine study  The transthoracic approach was used  The study included complete 2D imaging, M-mode, complete spectral Doppler, and color Doppler  The heart rate was 80 bpm,  at the start of the study  This was a technically difficult study      LEFT VENTRICLE: Size was normal  Systolic function was normal  Ejection fraction was estimated to be 60 %  There were no regional wall motion abnormalities  Wall thickness was at the upper limits of normal      RIGHT VENTRICLE: The size was at the upper limits of normal  Systolic function was normal  Wall thickness was normal      LEFT ATRIUM: Size was normal      RIGHT ATRIUM: Size was at the upper limits of normal      MITRAL VALVE: There was mild annular calcification  DOPPLER: There was no significant regurgitation      AORTIC VALVE: The valve was probably trileaflet  DOPPLER: There was mild to moderate stenosis  There was no significant regurgitation      TRICUSPID VALVE: DOPPLER: There was mild regurgitation  Estimated peak PA pressure was 35 mmHg      PULMONIC VALVE: Not well visualized      PERICARDIUM: A small pericardial effusion was identified circumferential to the heart   There was no evidence of hemodynamic compromise      AORTA: The root exhibited normal size      MEASUREMENT TABLES     DOPPLER MEASUREMENTS  Aortic valve   (Reference normals)  Peak gradient   25 95 mmHg   (--)  Mean gradient   16 04 mmHg   (--)  Valve area, cont   1 4 cm squared   (--)     SYSTEM MEASUREMENT TABLES     2D  %FS: 34 75 %  Ao Diam: 2 66 cm  EDV(Teich): 83 84 ml  EF(Teich): 64 23 %  ESV(Teich): 29 99 ml  IVSd: 1 24 cm  LA Diam: 3 08 cm  LVIDd: 4 32 cm  LVIDs: 2 82 cm  LVOT Diam: 1 93 cm  LVPWd: 1 01 cm  SV(Teich): 53 85 ml     CW  AV Env  Ti: 328 72 ms  AV VTI: 62 55 cm  AV Vmax: 2 57 m/s  AV Vmax: 2 55 m/s  AV Vmean: 1 9 m/s  AV maxP 51 mmHg  AV maxP 95 mmHg  AV meanP 04 mmHg  TR Vmax: 2 96 m/s  TR maxP 09 mmHg     PW  SHERMAN Vmax, Pt: 1 38 cm2  SHERMAN Vmax, Pt: 1 37 cm2  E' Sept: 0 07 m/s  E/E' Sept: 13 34  LVOT Vmax: 1 21 m/s  LVOT maxP 84 mmHg  MV A Martin: 1 15 m/s  MV Dec [place]: 6 71 m/s2  MV DecT: 133 78 ms  MV E Martin: 0 9 m/s  MV E/A Ratio: 0 78     [employer] Accredited Echocardiography Laboratory     Prepared and electronically signed by  Bella Aguilera MD  Signed 06-Dec-2018 10:36:00    · Aspirin 81 mg PO Qdaily  · High-intensity statin therapy was initiated with Atorvastatin 40 mg PO daily  · Continue telemetry monitoring to watch for any signs of atrial fibrillation/atrial flutter, which could cause an embolic CVA  · PT/OT

## 2018-12-07 NOTE — PLAN OF CARE
Problem: PHYSICAL THERAPY ADULT  Goal: Performs mobility at highest level of function for planned discharge setting  See evaluation for individualized goals  Outcome: Progressing  Prognosis: Good  Problem List: Decreased strength, Decreased endurance, Decreased range of motion, Impaired balance, Decreased mobility, Decreased cognition, Impaired judgement, Decreased safety awareness  Assessment: Pt  requiring max x 2 for bed mobility and SPT OOB to chair  Improved sitting balance  Pt  was able to stand for transfer  Pt  unable to ambulate  Continues with intermittent tremoringaffecting balance and mobility  Pt is in need of continued activity in PT to improve safety balance endurance strength mobility transfers and short distance ambulation or w/c mobility  Recommendation: Long-term skilled PT, Long-term skilled nursing home placement     PT - OK to Discharge: No (when medically stable for discharge)    See flowsheet documentation for full assessment

## 2018-12-07 NOTE — ASSESSMENT & PLAN NOTE
· The patient is more awake today  · Advance the patient to a pureed solid food consistency with honey thick liquids  · Aspiration precautions at all times  · I will have speech therapy re-evaluate the patient today

## 2018-12-07 NOTE — PHYSICAL THERAPY NOTE
PT treatment note      12/07/18 0834   Restrictions/Precautions   Other Precautions (Telemetry;O2;Fall Risk; Chair Alarm; Bed Alarm)   Cognition   Overall Cognitive Status Impaired   Arousal/Participation Alert; Cooperative   Following Commands Follows one step commands with increased time or repetition   Bed Mobility   Supine to Sit 2  Maximal assistance   Additional items Assist x 2;Bedrails; Increased time required;Verbal cues;LE management   Additional Comments seated EOB for therapeutic exercises BLE's with min to Supervison for sitting balance unsuporrted   Transfers   Sit to Stand 2  Maximal assistance   Additional items Assist x 2;Impulsive;Verbal cues   Stand to Sit 2  Maximal assistance   Additional items Assist x 2; Increased time required;Verbal cues   Stand pivot 2  Maximal assistance   Additional items Assist x 2;Verbal cues   Additional Comments SPT OOB to chair max x 2  Unable to ambulate  Balance   Static Sitting Fair   Dynamic Sitting Fair   Static Standing Poor +   Dynamic Standing Poor   Endurance Deficit   Endurance Deficit Yes   Activity Tolerance   Activity Tolerance Patient limited by fatigue   Exercises   Hip Flexion Sitting;10 reps;AAROM; Bilateral   Hip Abduction Sitting;10 reps;AAROM; Bilateral   Hip Adduction Sitting;10 reps;AAROM; Bilateral   Knee AROM Long Arc Quad Sitting;10 reps;AAROM; Bilateral   Ankle Pumps Sitting;10 reps;PROM; Bilateral   Assessment   Prognosis Good   Problem List Decreased strength;Decreased endurance;Decreased range of motion; Impaired balance;Decreased mobility; Decreased cognition; Impaired judgement;Decreased safety awareness   Assessment Pt  requiring max x 2 for bed mobility and SPT OOB to chair  Improved sitting balance  Pt  was able to stand for transfer  Pt  unable to ambulate  Continues with intermittent tremoringaffecting balance and mobility    Pt is in need of continued activity in PT to improve safety balance endurance strength mobility transfers and short distance ambulation or w/c mobility  Plan   Treatment/Interventions Functional transfer training;LE strengthening/ROM; Therapeutic exercise; Endurance training;Bed mobility;Gait training   Progress Slow progress, decreased activity tolerance   Recommendation   Recommendation Long-term skilled PT;Long-term skilled nursing home placement   Pt  OOB in chair  with call bell within reach, scd's connected and turned on and alarm on at end of PT session  Discussed with Tejal Morocho, PT today's treatment and patient's current level of function for care coordination

## 2018-12-07 NOTE — ASSESSMENT & PLAN NOTE
Results for Dario Coyle (MRN [id number]) as of 12/6/2018 10:23   Ref   Range 12/6/2018 05:02   TSH 3RD GENERATON Latest Ref Range: 0 358 - 3 740 uIU/mL 5 787 (H)     · Increase levothyroxine to 100 micrograms PO Qdaily in the early morning  · Recheck a TSH level in 4-6 weeks

## 2018-12-08 PROBLEM — E83.39 HYPOPHOSPHATEMIA: Status: ACTIVE | Noted: 2018-12-08

## 2018-12-08 LAB
ALBUMIN SERPL BCP-MCNC: 2.3 G/DL (ref 3.5–5)
ALP SERPL-CCNC: 69 U/L (ref 46–116)
ALT SERPL W P-5'-P-CCNC: 19 U/L (ref 12–78)
ANION GAP SERPL CALCULATED.3IONS-SCNC: 7 MMOL/L (ref 4–13)
AST SERPL W P-5'-P-CCNC: 20 U/L (ref 5–45)
BACTERIA UR CULT: ABNORMAL
BACTERIA UR CULT: ABNORMAL
BASOPHILS # BLD AUTO: 0.03 THOUSANDS/ΜL (ref 0–0.1)
BASOPHILS NFR BLD AUTO: 0 % (ref 0–1)
BILIRUB SERPL-MCNC: 0.2 MG/DL (ref 0.2–1)
BUN SERPL-MCNC: 29 MG/DL (ref 5–25)
CA-I BLD-SCNC: 1.09 MMOL/L (ref 1.12–1.32)
CALCIUM SERPL-MCNC: 7.8 MG/DL (ref 8.3–10.1)
CHLORIDE SERPL-SCNC: 114 MMOL/L (ref 100–108)
CO2 SERPL-SCNC: 29 MMOL/L (ref 21–32)
CREAT SERPL-MCNC: 1.21 MG/DL (ref 0.6–1.3)
EOSINOPHIL # BLD AUTO: 0.31 THOUSAND/ΜL (ref 0–0.61)
EOSINOPHIL NFR BLD AUTO: 4 % (ref 0–6)
ERYTHROCYTE [DISTWIDTH] IN BLOOD BY AUTOMATED COUNT: 15.1 % (ref 11.6–15.1)
GFR SERPL CREATININE-BSD FRML MDRD: 38 ML/MIN/1.73SQ M
GLUCOSE SERPL-MCNC: 74 MG/DL (ref 65–140)
HCT VFR BLD AUTO: 33.9 % (ref 34.8–46.1)
HGB BLD-MCNC: 10.4 G/DL (ref 11.5–15.4)
IMM GRANULOCYTES # BLD AUTO: 0.17 THOUSAND/UL (ref 0–0.2)
IMM GRANULOCYTES NFR BLD AUTO: 2 % (ref 0–2)
LYMPHOCYTES # BLD AUTO: 1.75 THOUSANDS/ΜL (ref 0.6–4.47)
LYMPHOCYTES NFR BLD AUTO: 25 % (ref 14–44)
MAGNESIUM SERPL-MCNC: 2.3 MG/DL (ref 1.6–2.6)
MCH RBC QN AUTO: 31.4 PG (ref 26.8–34.3)
MCHC RBC AUTO-ENTMCNC: 30.7 G/DL (ref 31.4–37.4)
MCV RBC AUTO: 102 FL (ref 82–98)
MONOCYTES # BLD AUTO: 0.6 THOUSAND/ΜL (ref 0.17–1.22)
MONOCYTES NFR BLD AUTO: 8 % (ref 4–12)
NEUTROPHILS # BLD AUTO: 4.27 THOUSANDS/ΜL (ref 1.85–7.62)
NEUTS SEG NFR BLD AUTO: 61 % (ref 43–75)
NRBC BLD AUTO-RTO: 0 /100 WBCS
PHOSPHATE SERPL-MCNC: 1.8 MG/DL (ref 2.3–4.1)
PLATELET # BLD AUTO: 289 THOUSANDS/UL (ref 149–390)
PMV BLD AUTO: 9.6 FL (ref 8.9–12.7)
POTASSIUM SERPL-SCNC: 4.4 MMOL/L (ref 3.5–5.3)
PROCALCITONIN SERPL-MCNC: 0.08 NG/ML
PROT SERPL-MCNC: 5.9 G/DL (ref 6.4–8.2)
RBC # BLD AUTO: 3.31 MILLION/UL (ref 3.81–5.12)
SODIUM SERPL-SCNC: 150 MMOL/L (ref 136–145)
WBC # BLD AUTO: 7.13 THOUSAND/UL (ref 4.31–10.16)

## 2018-12-08 PROCEDURE — 99232 SBSQ HOSP IP/OBS MODERATE 35: CPT | Performed by: INTERNAL MEDICINE

## 2018-12-08 PROCEDURE — 83735 ASSAY OF MAGNESIUM: CPT | Performed by: INTERNAL MEDICINE

## 2018-12-08 PROCEDURE — 84100 ASSAY OF PHOSPHORUS: CPT | Performed by: INTERNAL MEDICINE

## 2018-12-08 PROCEDURE — 82330 ASSAY OF CALCIUM: CPT | Performed by: INTERNAL MEDICINE

## 2018-12-08 PROCEDURE — 85025 COMPLETE CBC W/AUTO DIFF WBC: CPT | Performed by: INTERNAL MEDICINE

## 2018-12-08 PROCEDURE — 80053 COMPREHEN METABOLIC PANEL: CPT | Performed by: INTERNAL MEDICINE

## 2018-12-08 PROCEDURE — 84145 PROCALCITONIN (PCT): CPT | Performed by: INTERNAL MEDICINE

## 2018-12-08 RX ORDER — DEXTROSE MONOHYDRATE 50 MG/ML
100 INJECTION, SOLUTION INTRAVENOUS CONTINUOUS
Status: DISCONTINUED | OUTPATIENT
Start: 2018-12-08 | End: 2018-12-09

## 2018-12-08 RX ADMIN — DEXTROSE 100 ML/HR: 5 SOLUTION INTRAVENOUS at 09:38

## 2018-12-08 RX ADMIN — CEFTRIAXONE 1000 MG: 1 INJECTION, SOLUTION INTRAVENOUS at 18:34

## 2018-12-08 RX ADMIN — ASPIRIN 81 MG 81 MG: 81 TABLET ORAL at 08:48

## 2018-12-08 RX ADMIN — POTASSIUM PHOSPHATE, MONOBASIC AND POTASSIUM PHOSPHATE, DIBASIC 12 MMOL: 224; 236 INJECTION, SOLUTION INTRAVENOUS at 10:41

## 2018-12-08 RX ADMIN — ATORVASTATIN CALCIUM 40 MG: 40 TABLET, FILM COATED ORAL at 18:33

## 2018-12-08 RX ADMIN — LEVOTHYROXINE SODIUM 100 MCG: 100 TABLET ORAL at 05:08

## 2018-12-08 RX ADMIN — FERROUS SULFATE TAB 325 MG (65 MG ELEMENTAL FE) 325 MG: 325 (65 FE) TAB at 18:33

## 2018-12-08 RX ADMIN — COLLAGENASE SANTYL: 250 OINTMENT TOPICAL at 08:52

## 2018-12-08 RX ADMIN — HEPARIN SODIUM 5000 UNITS: 5000 INJECTION, SOLUTION INTRAVENOUS; SUBCUTANEOUS at 14:38

## 2018-12-08 RX ADMIN — HEPARIN SODIUM 5000 UNITS: 5000 INJECTION, SOLUTION INTRAVENOUS; SUBCUTANEOUS at 05:10

## 2018-12-08 RX ADMIN — COLLAGENASE SANTYL: 250 OINTMENT TOPICAL at 18:34

## 2018-12-08 RX ADMIN — FERROUS SULFATE TAB 325 MG (65 MG ELEMENTAL FE) 325 MG: 325 (65 FE) TAB at 08:48

## 2018-12-08 RX ADMIN — HEPARIN SODIUM 5000 UNITS: 5000 INJECTION, SOLUTION INTRAVENOUS; SUBCUTANEOUS at 21:55

## 2018-12-08 RX ADMIN — MULTIPLE VITAMINS W/ MINERALS TAB 1 TABLET: TAB at 08:48

## 2018-12-08 RX ADMIN — SODIUM CHLORIDE 75 ML/HR: 0.45 INJECTION, SOLUTION INTRAVENOUS at 00:41

## 2018-12-08 NOTE — ASSESSMENT & PLAN NOTE
Results for Breanna Anaya (MRN [id number]) as of 12/6/2018 10:23   Ref   Range 12/6/2018 05:02   TSH 3RD GENERATON Latest Ref Range: 0 358 - 3 740 uIU/mL 5 787 (H)     · Increase levothyroxine to 100 micrograms PO Qdaily in the early morning  · Recheck a TSH level in 4-6 weeks

## 2018-12-08 NOTE — ASSESSMENT & PLAN NOTE
· The acute encephalopathy has improved  · Also with expressive aphasia  · Possibly secondary to acute cystitis  · Continue the stroke pathway  · An MRI of the brain was completed on 12/06/2018 with the following results/impression:  FINDINGS:     BRAIN PARENCHYMA:  Mild patchy periventricular white matter FLAIR/T2 hyperintensity suggesting chronic microangiopathic change  Appearance is similar to October  No acute infarct  No parenchymal hemorrhage  No mass  Chronic calcifications again   demonstrated within the basal ganglia      VENTRICLES AND EXTRA-AXIAL SPACES:  Mild involutional volume loss  No hydrocephalus, herniation, or mass effect  No extra-axial or intraventricular hemorrhage      SELLA AND PITUITARY GLAND:  Normal      ORBITS:  Prior bilateral lens surgeries  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  No acute orbital findings      PARANASAL SINUSES:  Mild chronic mucosal disease in the maxillary, frontal, and sphenoid sinuses as well as and the anterior ethmoid air cells  No fluid levels  No mastoid effusions      VASCULATURE:  Evaluation of the major intracranial vasculature demonstrates appropriate flow voids  MRA from the same day is reported separately      CALVARIUM AND SKULL BASE:  Normal      EXTRACRANIAL SOFT TISSUES:  Normal      IMPRESSION:     1  No acute intracranial findings or significant change from prior  Specifically, no infarct      2   Mild chronic sinus mucosal disease  · An MRA of the head was completed on 12/06/2018 with the following impression/results:  FINDINGS:     IMAGE QUALITY:  Diagnostic      ANATOMY     INTERNAL CAROTID ARTERIES:  Luminal irregularity within the cavernous segment of the internal carotids suggests atheromatous disease, also seen on CT  No flow-limiting stenoses demonstrated  Normal ICA termini      ANTERIOR CIRCULATION:  Normal A1 segments  Normal anterior communicating artery    Normal flow-related enhancement of the anterior cerebral arteries      MIDDLE CEREBRAL ARTERY CIRCULATION:  The M1 segment and middle cerebral artery branches demonstrate normal flow-related enhancement      DISTAL VERTEBRAL ARTERIES:  Congenital left dominance--and normal variant  No flow-limiting stenoses  The posterior inferior cerebellar artery origins are normal       BASILAR ARTERY:  Normal      POSTERIOR CEREBRAL ARTERIES:  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  Both arteries demonstrate normal flow-related enhancement  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      IMPRESSION:     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  No occlusive vasculopathy or aneurysms demonstrated in the intracranial circulation  · A bilateral carotid duplex was completed on 12/05/2018 with the following results/impression:  CONCLUSION:     Impression  RIGHT:  There is <50% stenosis noted in the internal carotid artery  Plaque is  heterogenous and irregular  Vertebral artery flow is antegrade  There is no significant subclavian artery  disease  LEFT:  There is <50% stenosis noted in the internal carotid artery  Plaque is  heterogenous and irregular  Vertebral artery flow is antegrade  There is no significant subclavian artery  disease  There are no priors for comparison  Challenging exam due to pronounced snoring and inability to stay awake for  exam      Internal carotid artery stenosis determination by consensus criteria from:  Drew Ortiz et al  Carotid Artery Stenosis: Gray-Scale and Doppler US Diagnosis  - Society of [employer] in [address] [place] [address], Radiology 2003;  [phone]       SIGNATURE:  Electronically Signed by: Anna Marie Poe on 2018-12-05 11:51:32 PM    · An echocardiogram was completed on 12/06/2018 with the following results:  SUMMARY     PROCEDURE INFORMATION:  This was a technically difficult study      LEFT VENTRICLE:  Size was normal   Systolic function was normal  Ejection fraction was estimated to be 60 %  There were no regional wall motion abnormalities  Wall thickness was at the upper limits of normal      RIGHT VENTRICLE:  The size was at the upper limits of normal      MITRAL VALVE:  There was mild annular calcification      AORTIC VALVE:  The valve was probably trileaflet  There was mild to moderate stenosis  Valve peak gradient was 25 95 mmHg  Valve mean gradient was 16 04 mmHg  Aortic valve area was 1 4 cm squared by the continuity equation      TRICUSPID VALVE:  There was mild regurgitation  Estimated peak PA pressure was 35 mmHg      PERICARDIUM:  A small pericardial effusion was identified circumferential to the heart  There was no evidence of hemodynamic compromise      HISTORY: PRIOR HISTORY: echo 10/2018, aortic stenosis     PROCEDURE: The procedure was performed at the bedside  This was a routine study  The transthoracic approach was used  The study included complete 2D imaging, M-mode, complete spectral Doppler, and color Doppler  The heart rate was 80 bpm,  at the start of the study  This was a technically difficult study      LEFT VENTRICLE: Size was normal  Systolic function was normal  Ejection fraction was estimated to be 60 %  There were no regional wall motion abnormalities  Wall thickness was at the upper limits of normal      RIGHT VENTRICLE: The size was at the upper limits of normal  Systolic function was normal  Wall thickness was normal      LEFT ATRIUM: Size was normal      RIGHT ATRIUM: Size was at the upper limits of normal      MITRAL VALVE: There was mild annular calcification  DOPPLER: There was no significant regurgitation      AORTIC VALVE: The valve was probably trileaflet  DOPPLER: There was mild to moderate stenosis  There was no significant regurgitation      TRICUSPID VALVE: DOPPLER: There was mild regurgitation   Estimated peak PA pressure was 35 mmHg      PULMONIC VALVE: Not well visualized      PERICARDIUM: A small pericardial effusion was identified circumferential to the heart  There was no evidence of hemodynamic compromise      AORTA: The root exhibited normal size      MEASUREMENT TABLES     DOPPLER MEASUREMENTS  Aortic valve   (Reference normals)  Peak gradient   25 95 mmHg   (--)  Mean gradient   16 04 mmHg   (--)  Valve area, cont   1 4 cm squared   (--)     SYSTEM MEASUREMENT TABLES     2D  %FS: 34 75 %  Ao Diam: 2 66 cm  EDV(Teich): 83 84 ml  EF(Teich): 64 23 %  ESV(Teich): 29 99 ml  IVSd: 1 24 cm  LA Diam: 3 08 cm  LVIDd: 4 32 cm  LVIDs: 2 82 cm  LVOT Diam: 1 93 cm  LVPWd: 1 01 cm  SV(Teich): 53 85 ml     CW  AV Env  Ti: 328 72 ms  AV VTI: 62 55 cm  AV Vmax: 2 57 m/s  AV Vmax: 2 55 m/s  AV Vmean: 1 9 m/s  AV maxP 51 mmHg  AV maxP 95 mmHg  AV meanP 04 mmHg  TR Vmax: 2 96 m/s  TR maxP 09 mmHg     PW  SHERMAN Vmax, Pt: 1 38 cm2  SHERMAN Vmax, Pt: 1 37 cm2  E' Sept: 0 07 m/s  E/E' Sept: 13 34  LVOT Vmax: 1 21 m/s  LVOT maxP 84 mmHg  MV A Martin: 1 15 m/s  MV Dec [place]: 6 71 m/s2  MV DecT: 133 78 ms  MV E Martin: 0 9 m/s  MV E/A Ratio: 0 78     [facility] Accredited Echocardiography Laboratory     Prepared and electronically signed by  Daniel Hahn MD  Signed 06-Dec-2018 10:36:00    · Aspirin 81 mg PO Qdaily  · High-intensity statin therapy was initiated with Atorvastatin 40 mg PO daily  · Continue telemetry monitoring to watch for any signs of atrial fibrillation/atrial flutter, which could cause an embolic CVA  · PT/OT

## 2018-12-08 NOTE — ASSESSMENT & PLAN NOTE
· Continue the stroke pathway  · An MRI of the brain was completed on 12/06/2018 with the following results/impression:  FINDINGS:     BRAIN PARENCHYMA:  Mild patchy periventricular white matter FLAIR/T2 hyperintensity suggesting chronic microangiopathic change  Appearance is similar to October  No acute infarct  No parenchymal hemorrhage  No mass  Chronic calcifications again   demonstrated within the basal ganglia      VENTRICLES AND EXTRA-AXIAL SPACES:  Mild involutional volume loss  No hydrocephalus, herniation, or mass effect  No extra-axial or intraventricular hemorrhage      SELLA AND PITUITARY GLAND:  Normal      ORBITS:  Prior bilateral lens surgeries  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  No acute orbital findings      PARANASAL SINUSES:  Mild chronic mucosal disease in the maxillary, frontal, and sphenoid sinuses as well as and the anterior ethmoid air cells  No fluid levels  No mastoid effusions      VASCULATURE:  Evaluation of the major intracranial vasculature demonstrates appropriate flow voids  MRA from the same day is reported separately      CALVARIUM AND SKULL BASE:  Normal      EXTRACRANIAL SOFT TISSUES:  Normal      IMPRESSION:     1  No acute intracranial findings or significant change from prior  Specifically, no infarct      2   Mild chronic sinus mucosal disease  · An MRA of the head was completed on 12/06/2018 with the following impression/results:  FINDINGS:     IMAGE QUALITY:  Diagnostic      ANATOMY     INTERNAL CAROTID ARTERIES:  Luminal irregularity within the cavernous segment of the internal carotids suggests atheromatous disease, also seen on CT  No flow-limiting stenoses demonstrated  Normal ICA termini      ANTERIOR CIRCULATION:  Normal A1 segments  Normal anterior communicating artery    Normal flow-related enhancement of the anterior cerebral arteries      MIDDLE CEREBRAL ARTERY CIRCULATION:  The M1 segment and middle cerebral artery branches demonstrate normal flow-related enhancement      DISTAL VERTEBRAL ARTERIES:  Congenital left dominance--and normal variant  No flow-limiting stenoses  The posterior inferior cerebellar artery origins are normal       BASILAR ARTERY:  Normal      POSTERIOR CEREBRAL ARTERIES:  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  Both arteries demonstrate normal flow-related enhancement  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      IMPRESSION:     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  No occlusive vasculopathy or aneurysms demonstrated in the intracranial circulation  · A bilateral carotid duplex was completed on 12/05/2018 with the following results/impression:  CONCLUSION:     Impression  RIGHT:  There is <50% stenosis noted in the internal carotid artery  Plaque is  heterogenous and irregular  Vertebral artery flow is antegrade  There is no significant subclavian artery  disease  LEFT:  There is <50% stenosis noted in the internal carotid artery  Plaque is  heterogenous and irregular  Vertebral artery flow is antegrade  There is no significant subclavian artery  disease  There are no priors for comparison  Challenging exam due to pronounced snoring and inability to stay awake for  exam      Internal carotid artery stenosis determination by consensus criteria from:  Ana Luisa Aden et al  Carotid Artery Stenosis: Gray-Scale and Doppler US Diagnosis  - [employer] in [address] [place] [address], Radiology 2003;  [phone]  SIGNATURE:  Electronically Signed by: Kenneth Valdez on 2018-12-05 11:51:32 PM    · An echocardiogram was completed on 12/06/2018 with the following results:  SUMMARY     PROCEDURE INFORMATION:  This was a technically difficult study      LEFT VENTRICLE:  Size was normal   Systolic function was normal  Ejection fraction was estimated to be 60 %    There were no regional wall motion abnormalities  Wall thickness was at the upper limits of normal      RIGHT VENTRICLE:  The size was at the upper limits of normal      MITRAL VALVE:  There was mild annular calcification      AORTIC VALVE:  The valve was probably trileaflet  There was mild to moderate stenosis  Valve peak gradient was 25 95 mmHg  Valve mean gradient was 16 04 mmHg  Aortic valve area was 1 4 cm squared by the continuity equation      TRICUSPID VALVE:  There was mild regurgitation  Estimated peak PA pressure was 35 mmHg      PERICARDIUM:  A small pericardial effusion was identified circumferential to the heart  There was no evidence of hemodynamic compromise      HISTORY: PRIOR HISTORY: echo 10/2018, aortic stenosis     PROCEDURE: The procedure was performed at the bedside  This was a routine study  The transthoracic approach was used  The study included complete 2D imaging, M-mode, complete spectral Doppler, and color Doppler  The heart rate was 80 bpm,  at the start of the study  This was a technically difficult study      LEFT VENTRICLE: Size was normal  Systolic function was normal  Ejection fraction was estimated to be 60 %  There were no regional wall motion abnormalities  Wall thickness was at the upper limits of normal      RIGHT VENTRICLE: The size was at the upper limits of normal  Systolic function was normal  Wall thickness was normal      LEFT ATRIUM: Size was normal      RIGHT ATRIUM: Size was at the upper limits of normal      MITRAL VALVE: There was mild annular calcification  DOPPLER: There was no significant regurgitation      AORTIC VALVE: The valve was probably trileaflet  DOPPLER: There was mild to moderate stenosis  There was no significant regurgitation      TRICUSPID VALVE: DOPPLER: There was mild regurgitation  Estimated peak PA pressure was 35 mmHg      PULMONIC VALVE: Not well visualized      PERICARDIUM: A small pericardial effusion was identified circumferential to the heart   There was no evidence of hemodynamic compromise      AORTA: The root exhibited normal size      MEASUREMENT TABLES     DOPPLER MEASUREMENTS  Aortic valve   (Reference normals)  Peak gradient   25 95 mmHg   (--)  Mean gradient   16 04 mmHg   (--)  Valve area, cont   1 4 cm squared   (--)     SYSTEM MEASUREMENT TABLES     2D  %FS: 34 75 %  Ao Diam: 2 66 cm  EDV(Teich): 83 84 ml  EF(Teich): 64 23 %  ESV(Teich): 29 99 ml  IVSd: 1 24 cm  LA Diam: 3 08 cm  LVIDd: 4 32 cm  LVIDs: 2 82 cm  LVOT Diam: 1 93 cm  LVPWd: 1 01 cm  SV(Teich): 53 85 ml     CW  AV Env  Ti: 328 72 ms  AV VTI: 62 55 cm  AV Vmax: 2 57 m/s  AV Vmax: 2 55 m/s  AV Vmean: 1 9 m/s  AV maxP 51 mmHg  AV maxP 95 mmHg  AV meanP 04 mmHg  TR Vmax: 2 96 m/s  TR maxP 09 mmHg     PW  SHERMAN Vmax, Pt: 1 38 cm2  SHERMAN Vmax, Pt: 1 37 cm2  E' Sept: 0 07 m/s  E/E' Sept: 13 34  LVOT Vmax: 1 21 m/s  LVOT maxP 84 mmHg  MV A Martin: 1 15 m/s  MV Dec Stanley: 6 71 m/s2  MV DecT: 133 78 ms  MV E Martin: 0 9 m/s  MV E/A Ratio: 0 78     [employer] Accredited Echocardiography Laboratory     Prepared and electronically signed by  Gretchen Ralph MD  Signed 06-Dec-2018 10:36:00    · Aspirin 81 mg PO Qdaily  · High-intensity statin therapy was initiated with Atorvastatin 40 mg PO daily  · Continue telemetry monitoring to watch for any signs of atrial fibrillation/atrial flutter, which could cause an embolic CVA  · PT/OT

## 2018-12-08 NOTE — PROGRESS NOTES
Progress Note - Josué Diop 1/3/1924, 80 y o  female MRN: [id number]    Unit/Bed#: 302-23 Encounter: [id number]    Primary Care Provider: Michelle Clarke DO   Date and time admitted to hospital: 12/5/2018 11:27 AM        * Acute encephalopathy   Assessment & Plan    · The acute encephalopathy has improved  · Also with expressive aphasia  · Possibly secondary to acute cystitis  · Continue the stroke pathway  · An MRI of the brain was completed on 12/06/2018 with the following results/impression:  FINDINGS:     BRAIN PARENCHYMA:  Mild patchy periventricular white matter FLAIR/T2 hyperintensity suggesting chronic microangiopathic change  Appearance is similar to October  No acute infarct  No parenchymal hemorrhage  No mass  Chronic calcifications again   demonstrated within the basal ganglia      VENTRICLES AND EXTRA-AXIAL SPACES:  Mild involutional volume loss  No hydrocephalus, herniation, or mass effect  No extra-axial or intraventricular hemorrhage      SELLA AND PITUITARY GLAND:  Normal      ORBITS:  Prior bilateral lens surgeries  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  No acute orbital findings      PARANASAL SINUSES:  Mild chronic mucosal disease in the maxillary, frontal, and sphenoid sinuses as well as and the anterior ethmoid air cells  No fluid levels  No mastoid effusions      VASCULATURE:  Evaluation of the major intracranial vasculature demonstrates appropriate flow voids  MRA from the same day is reported separately      CALVARIUM AND SKULL BASE:  Normal      EXTRACRANIAL SOFT TISSUES:  Normal      IMPRESSION:     1  No acute intracranial findings or significant change from prior  Specifically, no infarct      2   Mild chronic sinus mucosal disease      · An MRA of the head was completed on 12/06/2018 with the following impression/results:  FINDINGS:     IMAGE QUALITY:  Diagnostic      ANATOMY     INTERNAL CAROTID ARTERIES:  Luminal irregularity within the cavernous segment of the internal carotids suggests atheromatous disease, also seen on CT  No flow-limiting stenoses demonstrated  Normal ICA termini      ANTERIOR CIRCULATION:  Normal A1 segments  Normal anterior communicating artery  Normal flow-related enhancement of the anterior cerebral arteries      MIDDLE CEREBRAL ARTERY CIRCULATION:  The M1 segment and middle cerebral artery branches demonstrate normal flow-related enhancement      DISTAL VERTEBRAL ARTERIES:  Congenital left dominance--and normal variant  No flow-limiting stenoses  The posterior inferior cerebellar artery origins are normal       BASILAR ARTERY:  Normal      POSTERIOR CEREBRAL ARTERIES:  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  Both arteries demonstrate normal flow-related enhancement  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      IMPRESSION:     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  No occlusive vasculopathy or aneurysms demonstrated in the intracranial circulation  · A bilateral carotid duplex was completed on 12/05/2018 with the following results/impression:  CONCLUSION:     Impression  RIGHT:  There is <50% stenosis noted in the internal carotid artery  Plaque is  heterogenous and irregular  Vertebral artery flow is antegrade  There is no significant subclavian artery  disease  LEFT:  There is <50% stenosis noted in the internal carotid artery  Plaque is  heterogenous and irregular  Vertebral artery flow is antegrade  There is no significant subclavian artery  disease  There are no priors for comparison  Challenging exam due to pronounced snoring and inability to stay awake for  exam      Internal carotid artery stenosis determination by consensus criteria from:  Ko Witt , et al  Carotid Artery Stenosis: Gray-Scale and Doppler US Diagnosis  - Society of Radiologists in [address] [place] [address], Radiology 2003;  [phone]  SIGNATURE:  Electronically Signed by: Anyi Edwards on 2018-12-05 11:51:32 PM    · An echocardiogram was completed on 12/06/2018 with the following results:  SUMMARY     PROCEDURE INFORMATION:  This was a technically difficult study      LEFT VENTRICLE:  Size was normal   Systolic function was normal  Ejection fraction was estimated to be 60 %  There were no regional wall motion abnormalities  Wall thickness was at the upper limits of normal      RIGHT VENTRICLE:  The size was at the upper limits of normal      MITRAL VALVE:  There was mild annular calcification      AORTIC VALVE:  The valve was probably trileaflet  There was mild to moderate stenosis  Valve peak gradient was 25 95 mmHg  Valve mean gradient was 16 04 mmHg  Aortic valve area was 1 4 cm squared by the continuity equation      TRICUSPID VALVE:  There was mild regurgitation  Estimated peak PA pressure was 35 mmHg      PERICARDIUM:  A small pericardial effusion was identified circumferential to the heart  There was no evidence of hemodynamic compromise      HISTORY: PRIOR HISTORY: echo 10/2018, aortic stenosis     PROCEDURE: The procedure was performed at the bedside  This was a routine study  The transthoracic approach was used  The study included complete 2D imaging, M-mode, complete spectral Doppler, and color Doppler  The heart rate was 80 bpm,  at the start of the study  This was a technically difficult study      LEFT VENTRICLE: Size was normal  Systolic function was normal  Ejection fraction was estimated to be 60 %  There were no regional wall motion abnormalities  Wall thickness was at the upper limits of normal      RIGHT VENTRICLE: The size was at the upper limits of normal  Systolic function was normal  Wall thickness was normal      LEFT ATRIUM: Size was normal      RIGHT ATRIUM: Size was at the upper limits of normal      MITRAL VALVE: There was mild annular calcification   DOPPLER: There was no significant regurgitation      AORTIC VALVE: The valve was probably trileaflet  DOPPLER: There was mild to moderate stenosis  There was no significant regurgitation      TRICUSPID VALVE: DOPPLER: There was mild regurgitation  Estimated peak PA pressure was 35 mmHg      PULMONIC VALVE: Not well visualized      PERICARDIUM: A small pericardial effusion was identified circumferential to the heart  There was no evidence of hemodynamic compromise      AORTA: The root exhibited normal size      MEASUREMENT TABLES     DOPPLER MEASUREMENTS  Aortic valve   (Reference normals)  Peak gradient   25 95 mmHg   (--)  Mean gradient   16 04 mmHg   (--)  Valve area, cont   1 4 cm squared   (--)     SYSTEM MEASUREMENT TABLES     2D  %FS: 34 75 %  Ao Diam: 2 66 cm  EDV(Teich): 83 84 ml  EF(Teich): 64 23 %  ESV(Teich): 29 99 ml  IVSd: 1 24 cm  LA Diam: 3 08 cm  LVIDd: 4 32 cm  LVIDs: 2 82 cm  LVOT Diam: 1 93 cm  LVPWd: 1 01 cm  SV(Teich): 53 85 ml     CW  AV Env  Ti: 328 72 ms  AV VTI: 62 55 cm  AV Vmax: 2 57 m/s  AV Vmax: 2 55 m/s  AV Vmean: 1 9 m/s  AV maxP 51 mmHg  AV maxP 95 mmHg  AV meanP 04 mmHg  TR Vmax: 2 96 m/s  TR maxP 09 mmHg     PW  SHERMAN Vmax, Pt: 1 38 cm2  SHERMAN Vmax, Pt: 1 37 cm2  E' Sept: 0 07 m/s  E/E' Sept: 13 34  LVOT Vmax: 1 21 m/s  LVOT maxP 84 mmHg  MV A Martin: 1 15 m/s  MV Dec [place]: 6 71 m/s2  MV DecT: 133 78 ms  MV E Martin: 0 9 m/s  MV E/A Ratio: 0 78     [employer] Commission Accredited Echocardiography Laboratory     Prepared and electronically signed by  Veronica Foster MD  Signed 06-Dec-2018 10:36:00    · Aspirin 81 mg PO Qdaily  · High-intensity statin therapy was initiated with Atorvastatin 40 mg PO daily  · Continue telemetry monitoring to watch for any signs of atrial fibrillation/atrial flutter, which could cause an embolic CVA  · PT/OT       Dysphagia   Assessment & Plan    · Dysphagia diet with pureed solid food consistency and nectar thick liquids per speech therapy  · Nectar thick liquids with a straw  · All medications should be crushed in pureed food  · Aspiration precautions at all times       Hypernatremia   Assessment & Plan    · The sodium level increased today  · Change IV fluids to D5W at 100 ml/hr  · Follow the sodium level     Visual disturbance   Assessment & Plan    · Continue the stroke pathway  · An MRI of the brain was completed on 12/06/2018 with the following results/impression:  FINDINGS:     BRAIN PARENCHYMA:  Mild patchy periventricular white matter FLAIR/T2 hyperintensity suggesting chronic microangiopathic change  Appearance is similar to October  No acute infarct  No parenchymal hemorrhage  No mass  Chronic calcifications again   demonstrated within the basal ganglia      VENTRICLES AND EXTRA-AXIAL SPACES:  Mild involutional volume loss  No hydrocephalus, herniation, or mass effect  No extra-axial or intraventricular hemorrhage      SELLA AND PITUITARY GLAND:  Normal      ORBITS:  Prior bilateral lens surgeries  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  No acute orbital findings      PARANASAL SINUSES:  Mild chronic mucosal disease in the maxillary, frontal, and sphenoid sinuses as well as and the anterior ethmoid air cells  No fluid levels  No mastoid effusions      VASCULATURE:  Evaluation of the major intracranial vasculature demonstrates appropriate flow voids  MRA from the same day is reported separately      CALVARIUM AND SKULL BASE:  Normal      EXTRACRANIAL SOFT TISSUES:  Normal      IMPRESSION:     1  No acute intracranial findings or significant change from prior  Specifically, no infarct      2   Mild chronic sinus mucosal disease      · An MRA of the head was completed on 12/06/2018 with the following impression/results:  FINDINGS:     IMAGE QUALITY:  Diagnostic      ANATOMY     INTERNAL CAROTID ARTERIES:  Luminal irregularity within the cavernous segment of the internal carotids suggests atheromatous disease, also seen on CT   No flow-limiting stenoses demonstrated  Normal ICA termini      ANTERIOR CIRCULATION:  Normal A1 segments  Normal anterior communicating artery  Normal flow-related enhancement of the anterior cerebral arteries      MIDDLE CEREBRAL ARTERY CIRCULATION:  The M1 segment and middle cerebral artery branches demonstrate normal flow-related enhancement      DISTAL VERTEBRAL ARTERIES:  Congenital left dominance--and normal variant  No flow-limiting stenoses  The posterior inferior cerebellar artery origins are normal       BASILAR ARTERY:  Normal      POSTERIOR CEREBRAL ARTERIES:  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  Both arteries demonstrate normal flow-related enhancement  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      IMPRESSION:     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  No occlusive vasculopathy or aneurysms demonstrated in the intracranial circulation  · A bilateral carotid duplex was completed on 12/05/2018 with the following results/impression:  CONCLUSION:     Impression  RIGHT:  There is <50% stenosis noted in the internal carotid artery  Plaque is  heterogenous and irregular  Vertebral artery flow is antegrade  There is no significant subclavian artery  disease  LEFT:  There is <50% stenosis noted in the internal carotid artery  Plaque is  heterogenous and irregular  Vertebral artery flow is antegrade  There is no significant subclavian artery  disease  There are no priors for comparison  Challenging exam due to pronounced snoring and inability to stay awake for  exam      Internal carotid artery stenosis determination by consensus criteria from:  Justino Venegas , et al  Carotid Artery Stenosis: Gray-Scale and Doppler US Diagnosis  - Society of Radiologists in [address] [place] [address], Radiology 2003;  [phone]       SIGNATURE:  Electronically Signed by: Nakia Wakefield on 2018-12-05 11:51:32 PM    · An echocardiogram was completed on 12/06/2018 with the following results:  SUMMARY     PROCEDURE INFORMATION:  This was a technically difficult study      LEFT VENTRICLE:  Size was normal   Systolic function was normal  Ejection fraction was estimated to be 60 %  There were no regional wall motion abnormalities  Wall thickness was at the upper limits of normal      RIGHT VENTRICLE:  The size was at the upper limits of normal      MITRAL VALVE:  There was mild annular calcification      AORTIC VALVE:  The valve was probably trileaflet  There was mild to moderate stenosis  Valve peak gradient was 25 95 mmHg  Valve mean gradient was 16 04 mmHg  Aortic valve area was 1 4 cm squared by the continuity equation      TRICUSPID VALVE:  There was mild regurgitation  Estimated peak PA pressure was 35 mmHg      PERICARDIUM:  A small pericardial effusion was identified circumferential to the heart  There was no evidence of hemodynamic compromise      HISTORY: PRIOR HISTORY: echo 10/2018, aortic stenosis     PROCEDURE: The procedure was performed at the bedside  This was a routine study  The transthoracic approach was used  The study included complete 2D imaging, M-mode, complete spectral Doppler, and color Doppler  The heart rate was 80 bpm,  at the start of the study  This was a technically difficult study      LEFT VENTRICLE: Size was normal  Systolic function was normal  Ejection fraction was estimated to be 60 %  There were no regional wall motion abnormalities  Wall thickness was at the upper limits of normal      RIGHT VENTRICLE: The size was at the upper limits of normal  Systolic function was normal  Wall thickness was normal      LEFT ATRIUM: Size was normal      RIGHT ATRIUM: Size was at the upper limits of normal      MITRAL VALVE: There was mild annular calcification  DOPPLER: There was no significant regurgitation      AORTIC VALVE: The valve was probably trileaflet   DOPPLER: There was mild to moderate stenosis  There was no significant regurgitation      TRICUSPID VALVE: DOPPLER: There was mild regurgitation  Estimated peak PA pressure was 35 mmHg      PULMONIC VALVE: Not well visualized      PERICARDIUM: A small pericardial effusion was identified circumferential to the heart  There was no evidence of hemodynamic compromise      AORTA: The root exhibited normal size      MEASUREMENT TABLES     DOPPLER MEASUREMENTS  Aortic valve   (Reference normals)  Peak gradient   25 95 mmHg   (--)  Mean gradient   16 04 mmHg   (--)  Valve area, cont   1 4 cm squared   (--)     SYSTEM MEASUREMENT TABLES     2D  %FS: 34 75 %  Ao Diam: 2 66 cm  EDV(Teich): 83 84 ml  EF(Teich): 64 23 %  ESV(Teich): 29 99 ml  IVSd: 1 24 cm  LA Diam: 3 08 cm  LVIDd: 4 32 cm  LVIDs: 2 82 cm  LVOT Diam: 1 93 cm  LVPWd: 1 01 cm  SV(Teich): 53 85 ml     CW  AV Env  Ti: 328 72 ms  AV VTI: 62 55 cm  AV Vmax: 2 57 m/s  AV Vmax: 2 55 m/s  AV Vmean: 1 9 m/s  AV maxP 51 mmHg  AV maxP 95 mmHg  AV meanP 04 mmHg  TR Vmax: 2 96 m/s  TR maxP 09 mmHg     PW  SHERMAN Vmax, Pt: 1 38 cm2  SHERMAN Vmax, Pt: 1 37 cm2  E' Sept: 0 07 m/s  E/E' Sept: 13 34  LVOT Vmax: 1 21 m/s  LVOT maxP 84 mmHg  MV A Martin: 1 15 m/s  MV Dec [place]: 6 71 m/s2  MV DecT: 133 78 ms  MV E Martin: 0 9 m/s  MV E/A Ratio: 0 78     [employer] Accredited Echocardiography Laboratory     Prepared and electronically signed by  Kevin Herrera MD  Signed 06-Dec-2018 10:36:00    · Aspirin 81 mg PO Qdaily  · High-intensity statin therapy was initiated with Atorvastatin 40 mg PO daily  · Continue telemetry monitoring to watch for any signs of atrial fibrillation/atrial flutter, which could cause an embolic CVA  · PT/OT     Elevated TSH   Assessment & Plan    Results for Deya Bates (MRN [id number]) as of 2018 10:23   Ref   Range 2018 05:02   TSH 3RD GENERATON Latest Ref Range: 0 358 - 3 740 uIU/mL 5 787 (H)     · Increase levothyroxine to 100 micrograms PO Qdaily in the early morning  · Recheck a TSH level in 4-6 weeks     Facial droop   Assessment & Plan    · Continue the stroke pathway  · An MRI of the brain was completed on 12/06/2018 with the following results/impression:  FINDINGS:     BRAIN PARENCHYMA:  Mild patchy periventricular white matter FLAIR/T2 hyperintensity suggesting chronic microangiopathic change  Appearance is similar to October  No acute infarct  No parenchymal hemorrhage  No mass  Chronic calcifications again   demonstrated within the basal ganglia      VENTRICLES AND EXTRA-AXIAL SPACES:  Mild involutional volume loss  No hydrocephalus, herniation, or mass effect  No extra-axial or intraventricular hemorrhage      SELLA AND PITUITARY GLAND:  Normal      ORBITS:  Prior bilateral lens surgeries  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  No acute orbital findings      PARANASAL SINUSES:  Mild chronic mucosal disease in the maxillary, frontal, and sphenoid sinuses as well as and the anterior ethmoid air cells  No fluid levels  No mastoid effusions      VASCULATURE:  Evaluation of the major intracranial vasculature demonstrates appropriate flow voids  MRA from the same day is reported separately      CALVARIUM AND SKULL BASE:  Normal      EXTRACRANIAL SOFT TISSUES:  Normal      IMPRESSION:     1  No acute intracranial findings or significant change from prior  Specifically, no infarct      2   Mild chronic sinus mucosal disease  · An MRA of the head was completed on 12/06/2018 with the following impression/results:  FINDINGS:     IMAGE QUALITY:  Diagnostic      ANATOMY     INTERNAL CAROTID ARTERIES:  Luminal irregularity within the cavernous segment of the internal carotids suggests atheromatous disease, also seen on CT  No flow-limiting stenoses demonstrated  Normal ICA termini      ANTERIOR CIRCULATION:  Normal A1 segments  Normal anterior communicating artery    Normal flow-related enhancement of the anterior cerebral arteries      MIDDLE CEREBRAL ARTERY CIRCULATION:  The M1 segment and middle cerebral artery branches demonstrate normal flow-related enhancement      DISTAL VERTEBRAL ARTERIES:  Congenital left dominance--and normal variant  No flow-limiting stenoses  The posterior inferior cerebellar artery origins are normal       BASILAR ARTERY:  Normal      POSTERIOR CEREBRAL ARTERIES:  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  Both arteries demonstrate normal flow-related enhancement  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      IMPRESSION:     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  No occlusive vasculopathy or aneurysms demonstrated in the intracranial circulation  · A bilateral carotid duplex was completed on 12/05/2018 with the following results/impression:  CONCLUSION:     Impression  RIGHT:  There is <50% stenosis noted in the internal carotid artery  Plaque is  heterogenous and irregular  Vertebral artery flow is antegrade  There is no significant subclavian artery  disease  LEFT:  There is <50% stenosis noted in the internal carotid artery  Plaque is  heterogenous and irregular  Vertebral artery flow is antegrade  There is no significant subclavian artery  disease  There are no priors for comparison  Challenging exam due to pronounced snoring and inability to stay awake for  exam      Internal carotid artery stenosis determination by consensus criteria from:  Paul Sawyer et al  Carotid Artery Stenosis: Gray-Scale and Doppler US Diagnosis  - Society of Radiologists in [address] [place] [address], Radiology 2003;  [phone]       SIGNATURE:  Electronically Signed by: Catherine Baker on 2018-12-05 11:51:32 PM    · An echocardiogram was completed on 12/06/2018 with the following results:  SUMMARY     PROCEDURE INFORMATION:  This was a technically difficult study      LEFT VENTRICLE:  Size was normal   Systolic function was normal  Ejection fraction was estimated to be 60 %  There were no regional wall motion abnormalities  Wall thickness was at the upper limits of normal      RIGHT VENTRICLE:  The size was at the upper limits of normal      MITRAL VALVE:  There was mild annular calcification      AORTIC VALVE:  The valve was probably trileaflet  There was mild to moderate stenosis  Valve peak gradient was 25 95 mmHg  Valve mean gradient was 16 04 mmHg  Aortic valve area was 1 4 cm squared by the continuity equation      TRICUSPID VALVE:  There was mild regurgitation  Estimated peak PA pressure was 35 mmHg      PERICARDIUM:  A small pericardial effusion was identified circumferential to the heart  There was no evidence of hemodynamic compromise      HISTORY: PRIOR HISTORY: echo 10/2018, aortic stenosis     PROCEDURE: The procedure was performed at the bedside  This was a routine study  The transthoracic approach was used  The study included complete 2D imaging, M-mode, complete spectral Doppler, and color Doppler  The heart rate was 80 bpm,  at the start of the study  This was a technically difficult study      LEFT VENTRICLE: Size was normal  Systolic function was normal  Ejection fraction was estimated to be 60 %  There were no regional wall motion abnormalities  Wall thickness was at the upper limits of normal      RIGHT VENTRICLE: The size was at the upper limits of normal  Systolic function was normal  Wall thickness was normal      LEFT ATRIUM: Size was normal      RIGHT ATRIUM: Size was at the upper limits of normal      MITRAL VALVE: There was mild annular calcification  DOPPLER: There was no significant regurgitation      AORTIC VALVE: The valve was probably trileaflet  DOPPLER: There was mild to moderate stenosis  There was no significant regurgitation      TRICUSPID VALVE: DOPPLER: There was mild regurgitation   Estimated peak PA pressure was 35 mmHg      PULMONIC VALVE: Not well visualized      PERICARDIUM: A small pericardial effusion was identified circumferential to the heart  There was no evidence of hemodynamic compromise      AORTA: The root exhibited normal size      MEASUREMENT TABLES     DOPPLER MEASUREMENTS  Aortic valve   (Reference normals)  Peak gradient   25 95 mmHg   (--)  Mean gradient   16 04 mmHg   (--)  Valve area, cont   1 4 cm squared   (--)     SYSTEM MEASUREMENT TABLES     2D  %FS: 34 75 %  Ao Diam: 2 66 cm  EDV(Teich): 83 84 ml  EF(Teich): 64 23 %  ESV(Teich): 29 99 ml  IVSd: 1 24 cm  LA Diam: 3 08 cm  LVIDd: 4 32 cm  LVIDs: 2 82 cm  LVOT Diam: 1 93 cm  LVPWd: 1 01 cm  SV(Teich): 53 85 ml     CW  AV Env  Ti: 328 72 ms  AV VTI: 62 55 cm  AV Vmax: 2 57 m/s  AV Vmax: 2 55 m/s  AV Vmean: 1 9 m/s  AV maxP 51 mmHg  AV maxP 95 mmHg  AV meanP 04 mmHg  TR Vmax: 2 96 m/s  TR maxP 09 mmHg     PW  SHERMAN Vmax, Pt: 1 38 cm2  SHERMAN Vmax, Pt: 1 37 cm2  E' Sept: 0 07 m/s  E/E' Sept: 13 34  LVOT Vmax: 1 21 m/s  LVOT maxP 84 mmHg  MV A Martin: 1 15 m/s  MV Dec [place]: 6 71 m/s2  MV DecT: 133 78 ms  MV E Martin: 0 9 m/s  MV E/A Ratio: 0 78     [facility] Accredited Echocardiography Laboratory     Prepared and electronically signed by  Gorge Braxton MD  Signed 06-Dec-2018 10:36:00    · Aspirin 81 mg PO Qdaily  · High-intensity statin therapy was initiated with Atorvastatin 40 mg PO daily  · Continue telemetry monitoring to watch for any signs of atrial fibrillation/atrial flutter, which could cause an embolic CVA  · PT/OT     Acute cystitis with hematuria   Assessment & Plan    · Continue IV ceftriaxone  · Await the urine culture results     Hypophosphatemia   Assessment & Plan    · Potassium phosphate 12 mmol IV x 1 dose  · Follow the phosphorus level     Pericardial effusion   Assessment & Plan    · No evidence of hemodynamic compromise on echocardiogram  · Recheck an echocardiogram in 1 month to monitor the pericardial effusion     Pulmonary hypertension ([address] )   Assessment & Plan    · Outpatient follow-up with Pulmonology     Aortic valve stenosis   Assessment & Plan    · Outpatient follow-up with Cardiology     Right bundle branch block   Assessment & Plan    · Outpatient follow-up with Cardiology     CKD (chronic kidney disease) stage 4, GFR 15-29 ml/min ([facility])   Assessment & Plan    · Baseline creatinine of 1 1-1 3  · Her creatinine improved to her baseline today  · Change IV fluids to D5W at 100 ml/hr in the setting of worsening hypernatremia  · Avoid all nephrotoxic agents  · Continue serial laboratory testing to monitor her renal function and electrolytes     Macrocytic anemia   Assessment & Plan    · Follow the CBC  · Transfuse for hemoglobin less than 7  Results for Yue Haywood (MRN [id number]) as of 12/7/2018 10:00   Ref  Range 12/6/2018 05:02   Folate Latest Ref Range: 3 1 - 17 5 ng/mL >20 0 (H)   Vitamin B-12 Latest Ref Range: 100 - 900 pg/mL 1,342 (H)        Acquired hypothyroidism   Assessment & Plan    Results for Yue Haywood (MRN [id number]) as of 12/6/2018 10:23   Ref  Range 12/6/2018 05:02   TSH 3RD GENERATON Latest Ref Range: 0 358 - 3 740 uIU/mL 5 787 (H)     · Increase levothyroxine to 100 micrograms PO Qdaily in the early morning  · Recheck a TSH level in 4-6 weeks           VTE Pharmacologic Prophylaxis:   Pharmacologic: Heparin  Mechanical VTE Prophylaxis in Place: Yes    Patient Centered Rounds: I have performed bedside rounds with nursing staff today  Time Spent for Care: 20 minutes  More than 50% of total time spent on counseling and coordination of care as described above  Current Length of Stay: 3 day(s)    Current Patient Status: Inpatient   Certification Statement: The patient will continue to require additional inpatient hospital stay due to the need for D5W IV fluids and the need for serial laboratory testing to monitor her sodium level  Code Status: Level 3 - DNAR and DNI      Subjective:    The patient was seen and examined  The patient is more awake and alert today  No expressive aphasia  Objective:     Vitals:   Temp (24hrs), Av 2 °F (36 8 °C), Min:97 8 °F (36 6 °C), Max:98 9 °F (37 2 °C)    Temp:  [97 8 °F (36 6 °C)-98 9 °F (37 2 °C)] 98 9 °F (37 2 °C)  HR:  [75-85] 75  Resp:  [18-20] 18  BP: (122-156)/(60-93) 122/93  SpO2:  [93 %-96 %] 96 %  Body mass index is 29 17 kg/m²  Input and Output Summary (last 24 hours): Intake/Output Summary (Last 24 hours) at 18 0905  Last data filed at 18 0800   Gross per 24 hour   Intake             1160 ml   Output                0 ml   Net             1160 ml       Physical Exam:     Physical Exam  General:  NAD, awake, alert, follows commands  HEENT:  NC/AT, mucous membranes dry  Neck:  Supple, No JVP elevation  CV:  + S1, + S2, RRR  Pulm:  Lung fields are CTA bilaterally  Abd:  Soft, Non-tender, Non-distended  Ext:  No clubbing/cyanosis/edema  Skin:  No rashes    Additional Data:     Labs:      Results from last 7 days  Lab Units 18  0512   WBC Thousand/uL 7 13   HEMOGLOBIN g/dL 10 4*   HEMATOCRIT % 33 9*   PLATELETS Thousands/uL 289   NEUTROS PCT % 61   LYMPHS PCT % 25   MONOS PCT % 8   EOS PCT % 4       Results from last 7 days  Lab Units 18  0512   SODIUM mmol/L 150*   POTASSIUM mmol/L 4 4   CHLORIDE mmol/L 114*   CO2 mmol/L 29   BUN mg/dL 29*   CREATININE mg/dL 1 21   ANION GAP mmol/L 7   CALCIUM mg/dL 7 8*   ALBUMIN g/dL 2 3*   TOTAL BILIRUBIN mg/dL 0 20   ALK PHOS U/L 69   ALT U/L 19   AST U/L 20   GLUCOSE RANDOM mg/dL 74       Results from last 7 days  Lab Units 18  1131   INR  0 98           Results from last 7 days  Lab Units 18  0502   HEMOGLOBIN A1C % 5 2               * I Have Reviewed All Lab Data Listed Above  * Additional Pertinent Lab Tests Reviewed:  Romaine 66 Admission Reviewed        Recent Cultures (last 7 days):       Results from last 7 days  Lab Units 18  1715   URINE CULTURE  Culture results to follow  10,000-19,000 cfu/ml Proteus mirabilis*       Last 24 Hours Medication List:     Current Facility-Administered Medications:  acetaminophen 650 mg Oral Q6H PRN [facility], DO    aspirin 81 mg Oral Daily [facility], DO    atorvastatin 40 mg Oral QPM [facility], DO    cefTRIAXone 1,000 mg Intravenous Q24H [facility], DO Last Rate: 1,000 mg (12/07/18 1920)   collagenase  Topical BID [facility], DO    dextrose 100 mL/hr Intravenous Continuous [facility], DO    ferrous sulfate 325 mg Oral BID With Meals [facility], DO    heparin (porcine) 5,000 Units Subcutaneous [facility], DO    levothyroxine 100 mcg Oral Early Morning [facility], DO    multivitamin-minerals 1 tablet Oral Daily Endy Tapia, DO    ondansetron 4 mg Intravenous Q4H PRN [facility], DO    potassium phosphate 12 mmol Intravenous Once [facility], DO         Today, Patient Was Seen By: [facility], DO    ** Please Note: Dictation voice to text software may have been used in the creation of this document   **

## 2018-12-08 NOTE — ASSESSMENT & PLAN NOTE
· Follow the CBC  · Transfuse for hemoglobin less than 7  Results for Juany Rangel (MRN [id number]) as of 12/7/2018 10:00   Ref   Range 12/6/2018 05:02   Folate Latest Ref Range: 3 1 - 17 5 ng/mL >20 0 (H)   Vitamin B-12 Latest Ref Range: 100 - 900 pg/mL 1,342 (H)

## 2018-12-08 NOTE — PLAN OF CARE
Activity Intolerance/Impaired Mobility     Mobility/activity is maintained at optimum level for patient Progressing        Communication Impairment     Ability to express needs and understand communication [place]     Discharge to home or other facility with appropriate resources Progressing        DISCHARGE PLANNING - CARE MANAGEMENT     Discharge to post-acute care or home with appropriate resources Progressing        INFECTION - ADULT     Absence or prevention of progression during hospitalization Progressing        Knowledge Deficit     Patient/family/caregiver demonstrates understanding of disease process, treatment plan, medications, and discharge instructions Progressing        Neurological Deficit     Neurological status is stable or improving Progressing        Nutrition     Nutrition/Hydration status is improving Progressing        Nutrition/Hydration-ADULT     Nutrient/Hydration intake appropriate for improving, restoring or maintaining nutritional needs Progressing        PAIN - ADULT     Verbalizes/displays adequate comfort level or baseline comfort level Progressing        Potential for Aspiration     Non-ventilated patient's risk of aspiration is minimized Progressing        Potential for Falls     Patient will remain free of falls Progressing        Prexisting or High Potential for Compromised Skin Integrity     Skin integrity is maintained or improved Progressing        SAFETY ADULT     Maintain or return to baseline ADL function Progressing     Maintain or return mobility status to optimal level Progressing     Patient will remain free of falls Progressing

## 2018-12-08 NOTE — ASSESSMENT & PLAN NOTE
· Baseline creatinine of 1 1-1 3  · Her creatinine improved to her baseline today  · Change IV fluids to D5W at 100 ml/hr in the setting of worsening hypernatremia  · Avoid all nephrotoxic agents  · Continue serial laboratory testing to monitor her renal function and electrolytes

## 2018-12-08 NOTE — ASSESSMENT & PLAN NOTE
· Continue the stroke pathway  · An MRI of the brain was completed on 12/06/2018 with the following results/impression:  FINDINGS:     BRAIN PARENCHYMA:  Mild patchy periventricular white matter FLAIR/T2 hyperintensity suggesting chronic microangiopathic change  Appearance is similar to October  No acute infarct  No parenchymal hemorrhage  No mass  Chronic calcifications again   demonstrated within the basal ganglia      VENTRICLES AND EXTRA-AXIAL SPACES:  Mild involutional volume loss  No hydrocephalus, herniation, or mass effect  No extra-axial or intraventricular hemorrhage      SELLA AND PITUITARY GLAND:  Normal      ORBITS:  Prior bilateral lens surgeries  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  No acute orbital findings      PARANASAL SINUSES:  Mild chronic mucosal disease in the maxillary, frontal, and sphenoid sinuses as well as and the anterior ethmoid air cells  No fluid levels  No mastoid effusions      VASCULATURE:  Evaluation of the major intracranial vasculature demonstrates appropriate flow voids  MRA from the same day is reported separately      CALVARIUM AND SKULL BASE:  Normal      EXTRACRANIAL SOFT TISSUES:  Normal      IMPRESSION:     1  No acute intracranial findings or significant change from prior  Specifically, no infarct      2   Mild chronic sinus mucosal disease  · An MRA of the head was completed on 12/06/2018 with the following impression/results:  FINDINGS:     IMAGE QUALITY:  Diagnostic      ANATOMY     INTERNAL CAROTID ARTERIES:  Luminal irregularity within the cavernous segment of the internal carotids suggests atheromatous disease, also seen on CT  No flow-limiting stenoses demonstrated  Normal ICA termini      ANTERIOR CIRCULATION:  Normal A1 segments  Normal anterior communicating artery    Normal flow-related enhancement of the anterior cerebral arteries      MIDDLE CEREBRAL ARTERY CIRCULATION:  The M1 segment and middle cerebral artery branches demonstrate normal flow-related enhancement      DISTAL VERTEBRAL ARTERIES:  Congenital left dominance--and normal variant  No flow-limiting stenoses  The posterior inferior cerebellar artery origins are normal       BASILAR ARTERY:  Normal      POSTERIOR CEREBRAL ARTERIES:  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  Both arteries demonstrate normal flow-related enhancement  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      IMPRESSION:     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  No occlusive vasculopathy or aneurysms demonstrated in the intracranial circulation  · A bilateral carotid duplex was completed on 12/05/2018 with the following results/impression:  CONCLUSION:     Impression  RIGHT:  There is <50% stenosis noted in the internal carotid artery  Plaque is  heterogenous and irregular  Vertebral artery flow is antegrade  There is no significant subclavian artery  disease  LEFT:  There is <50% stenosis noted in the internal carotid artery  Plaque is  heterogenous and irregular  Vertebral artery flow is antegrade  There is no significant subclavian artery  disease  There are no priors for comparison  Challenging exam due to pronounced snoring and inability to stay awake for  exam      Internal carotid artery stenosis determination by consensus criteria from:  Drew Ortiz et al  Carotid Artery Stenosis: Gray-Scale and Doppler US Diagnosis  - [employer] in [address] [place] [address], Radiology 2003;  [phone]  SIGNATURE:  Electronically Signed by: Anna Marie Poe on 2018-12-05 11:51:32 PM    · An echocardiogram was completed on 12/06/2018 with the following results:  SUMMARY     PROCEDURE INFORMATION:  This was a technically difficult study      LEFT VENTRICLE:  Size was normal   Systolic function was normal  Ejection fraction was estimated to be 60 %    There were no regional wall motion abnormalities  Wall thickness was at the upper limits of normal      RIGHT VENTRICLE:  The size was at the upper limits of normal      MITRAL VALVE:  There was mild annular calcification      AORTIC VALVE:  The valve was probably trileaflet  There was mild to moderate stenosis  Valve peak gradient was 25 95 mmHg  Valve mean gradient was 16 04 mmHg  Aortic valve area was 1 4 cm squared by the continuity equation      TRICUSPID VALVE:  There was mild regurgitation  Estimated peak PA pressure was 35 mmHg      PERICARDIUM:  A small pericardial effusion was identified circumferential to the heart  There was no evidence of hemodynamic compromise      HISTORY: PRIOR HISTORY: echo 10/2018, aortic stenosis     PROCEDURE: The procedure was performed at the bedside  This was a routine study  The transthoracic approach was used  The study included complete 2D imaging, M-mode, complete spectral Doppler, and color Doppler  The heart rate was 80 bpm,  at the start of the study  This was a technically difficult study      LEFT VENTRICLE: Size was normal  Systolic function was normal  Ejection fraction was estimated to be 60 %  There were no regional wall motion abnormalities  Wall thickness was at the upper limits of normal      RIGHT VENTRICLE: The size was at the upper limits of normal  Systolic function was normal  Wall thickness was normal      LEFT ATRIUM: Size was normal      RIGHT ATRIUM: Size was at the upper limits of normal      MITRAL VALVE: There was mild annular calcification  DOPPLER: There was no significant regurgitation      AORTIC VALVE: The valve was probably trileaflet  DOPPLER: There was mild to moderate stenosis  There was no significant regurgitation      TRICUSPID VALVE: DOPPLER: There was mild regurgitation  Estimated peak PA pressure was 35 mmHg      PULMONIC VALVE: Not well visualized      PERICARDIUM: A small pericardial effusion was identified circumferential to the heart   There was no evidence of hemodynamic compromise      AORTA: The root exhibited normal size      MEASUREMENT TABLES     DOPPLER MEASUREMENTS  Aortic valve   (Reference normals)  Peak gradient   25 95 mmHg   (--)  Mean gradient   16 04 mmHg   (--)  Valve area, cont   1 4 cm squared   (--)     SYSTEM MEASUREMENT TABLES     2D  %FS: 34 75 %  Ao Diam: 2 66 cm  EDV(Teich): 83 84 ml  EF(Teich): 64 23 %  ESV(Teich): 29 99 ml  IVSd: 1 24 cm  LA Diam: 3 08 cm  LVIDd: 4 32 cm  LVIDs: 2 82 cm  LVOT Diam: 1 93 cm  LVPWd: 1 01 cm  SV(Teich): 53 85 ml     CW  AV Env  Ti: 328 72 ms  AV VTI: 62 55 cm  AV Vmax: 2 57 m/s  AV Vmax: 2 55 m/s  AV Vmean: 1 9 m/s  AV maxP 51 mmHg  AV maxP 95 mmHg  AV meanP 04 mmHg  TR Vmax: 2 96 m/s  TR maxP 09 mmHg     PW  SHERMAN Vmax, Pt: 1 38 cm2  SHERMAN Vmax, Pt: 1 37 cm2  E' Sept: 0 07 m/s  E/E' Sept: 13 34  LVOT Vmax: 1 21 m/s  LVOT maxP 84 mmHg  MV A Martin: 1 15 m/s  MV Dec Hernando: 6 71 m/s2  MV DecT: 133 78 ms  MV E Martin: 0 9 m/s  MV E/A Ratio: 0 78     [employer] Accredited Echocardiography Laboratory     Prepared and electronically signed by  Roberto Bai MD  Signed 06-Dec-2018 10:36:00    · Aspirin 81 mg PO Qdaily  · High-intensity statin therapy was initiated with Atorvastatin 40 mg PO daily  · Continue telemetry monitoring to watch for any signs of atrial fibrillation/atrial flutter, which could cause an embolic CVA  · PT/OT

## 2018-12-08 NOTE — ASSESSMENT & PLAN NOTE
Results for Latisha Morris (MRN [id number]) as of 12/6/2018 10:23   Ref   Range 12/6/2018 05:02   TSH 3RD GENERATON Latest Ref Range: 0 358 - 3 740 uIU/mL 5 787 (H)     · Increase levothyroxine to 100 micrograms PO Qdaily in the early morning  · Recheck a TSH level in 4-6 weeks

## 2018-12-08 NOTE — ASSESSMENT & PLAN NOTE
· Dysphagia diet with pureed solid food consistency and nectar thick liquids per speech therapy  · Nectar thick liquids with a straw  · All medications should be crushed in pureed food  · Aspiration precautions at all times

## 2018-12-08 NOTE — ASSESSMENT & PLAN NOTE
· The sodium level increased today  · Change IV fluids to D5W at 100 ml/hr  · Follow the sodium level

## 2018-12-09 PROBLEM — E83.51 HYPOCALCEMIA: Status: ACTIVE | Noted: 2018-12-09

## 2018-12-09 LAB
ALBUMIN SERPL BCP-MCNC: 2.2 G/DL (ref 3.5–5)
ALP SERPL-CCNC: 64 U/L (ref 46–116)
ALT SERPL W P-5'-P-CCNC: 21 U/L (ref 12–78)
ANION GAP SERPL CALCULATED.3IONS-SCNC: 5 MMOL/L (ref 4–13)
AST SERPL W P-5'-P-CCNC: 19 U/L (ref 5–45)
BILIRUB SERPL-MCNC: 0.2 MG/DL (ref 0.2–1)
BUN SERPL-MCNC: 20 MG/DL (ref 5–25)
CALCIUM SERPL-MCNC: 7.5 MG/DL (ref 8.3–10.1)
CHLORIDE SERPL-SCNC: 110 MMOL/L (ref 100–108)
CO2 SERPL-SCNC: 30 MMOL/L (ref 21–32)
CREAT SERPL-MCNC: 1.1 MG/DL (ref 0.6–1.3)
ERYTHROCYTE [DISTWIDTH] IN BLOOD BY AUTOMATED COUNT: 14.7 % (ref 11.6–15.1)
GFR SERPL CREATININE-BSD FRML MDRD: 43 ML/MIN/1.73SQ M
GLUCOSE SERPL-MCNC: 90 MG/DL (ref 65–140)
HCT VFR BLD AUTO: 32.4 % (ref 34.8–46.1)
HGB BLD-MCNC: 9.9 G/DL (ref 11.5–15.4)
MAGNESIUM SERPL-MCNC: 2.3 MG/DL (ref 1.6–2.6)
MCH RBC QN AUTO: 31.3 PG (ref 26.8–34.3)
MCHC RBC AUTO-ENTMCNC: 30.6 G/DL (ref 31.4–37.4)
MCV RBC AUTO: 103 FL (ref 82–98)
PHOSPHATE SERPL-MCNC: 1.5 MG/DL (ref 2.3–4.1)
PLATELET # BLD AUTO: 278 THOUSANDS/UL (ref 149–390)
PMV BLD AUTO: 9.9 FL (ref 8.9–12.7)
POTASSIUM SERPL-SCNC: 4.4 MMOL/L (ref 3.5–5.3)
PROT SERPL-MCNC: 5.6 G/DL (ref 6.4–8.2)
RBC # BLD AUTO: 3.16 MILLION/UL (ref 3.81–5.12)
SODIUM SERPL-SCNC: 145 MMOL/L (ref 136–145)
WBC # BLD AUTO: 6.06 THOUSAND/UL (ref 4.31–10.16)

## 2018-12-09 PROCEDURE — 84100 ASSAY OF PHOSPHORUS: CPT | Performed by: INTERNAL MEDICINE

## 2018-12-09 PROCEDURE — 85027 COMPLETE CBC AUTOMATED: CPT | Performed by: INTERNAL MEDICINE

## 2018-12-09 PROCEDURE — 99232 SBSQ HOSP IP/OBS MODERATE 35: CPT | Performed by: INTERNAL MEDICINE

## 2018-12-09 PROCEDURE — 83735 ASSAY OF MAGNESIUM: CPT | Performed by: INTERNAL MEDICINE

## 2018-12-09 PROCEDURE — 80053 COMPREHEN METABOLIC PANEL: CPT | Performed by: INTERNAL MEDICINE

## 2018-12-09 RX ORDER — CEFEPIME HYDROCHLORIDE 1 G/50ML
1000 INJECTION, SOLUTION INTRAVENOUS EVERY 24 HOURS
Status: DISCONTINUED | OUTPATIENT
Start: 2018-12-09 | End: 2018-12-11 | Stop reason: HOSPADM

## 2018-12-09 RX ORDER — SODIUM CHLORIDE 450 MG/100ML
75 INJECTION, SOLUTION INTRAVENOUS CONTINUOUS
Status: DISCONTINUED | OUTPATIENT
Start: 2018-12-09 | End: 2018-12-10

## 2018-12-09 RX ORDER — B-COMPLEX WITH VITAMIN C
1 TABLET ORAL 2 TIMES DAILY WITH MEALS
Status: DISCONTINUED | OUTPATIENT
Start: 2018-12-09 | End: 2018-12-11 | Stop reason: HOSPADM

## 2018-12-09 RX ADMIN — FERROUS SULFATE TAB 325 MG (65 MG ELEMENTAL FE) 325 MG: 325 (65 FE) TAB at 09:14

## 2018-12-09 RX ADMIN — COLLAGENASE SANTYL: 250 OINTMENT TOPICAL at 09:14

## 2018-12-09 RX ADMIN — COLLAGENASE SANTYL: 250 OINTMENT TOPICAL at 19:02

## 2018-12-09 RX ADMIN — ATORVASTATIN CALCIUM 40 MG: 40 TABLET, FILM COATED ORAL at 19:00

## 2018-12-09 RX ADMIN — DEXTROSE 100 ML/HR: 5 SOLUTION INTRAVENOUS at 01:42

## 2018-12-09 RX ADMIN — POTASSIUM PHOSPHATE, MONOBASIC AND POTASSIUM PHOSPHATE, DIBASIC 12 MMOL: 224; 236 INJECTION, SOLUTION INTRAVENOUS at 12:29

## 2018-12-09 RX ADMIN — SODIUM CHLORIDE 75 ML/HR: 0.45 INJECTION, SOLUTION INTRAVENOUS at 10:25

## 2018-12-09 RX ADMIN — HEPARIN SODIUM 5000 UNITS: 5000 INJECTION, SOLUTION INTRAVENOUS; SUBCUTANEOUS at 05:46

## 2018-12-09 RX ADMIN — FERROUS SULFATE TAB 325 MG (65 MG ELEMENTAL FE) 325 MG: 325 (65 FE) TAB at 16:27

## 2018-12-09 RX ADMIN — MULTIPLE VITAMINS W/ MINERALS TAB 1 TABLET: TAB at 09:14

## 2018-12-09 RX ADMIN — HEPARIN SODIUM 5000 UNITS: 5000 INJECTION, SOLUTION INTRAVENOUS; SUBCUTANEOUS at 14:02

## 2018-12-09 RX ADMIN — CALCIUM CARBONATE-VITAMIN D TAB 500 MG-200 UNIT 1 TABLET: 500-200 TAB at 16:27

## 2018-12-09 RX ADMIN — ASPIRIN 81 MG 81 MG: 81 TABLET ORAL at 09:25

## 2018-12-09 RX ADMIN — LEVOTHYROXINE SODIUM 100 MCG: 100 TABLET ORAL at 05:45

## 2018-12-09 RX ADMIN — HEPARIN SODIUM 5000 UNITS: 5000 INJECTION, SOLUTION INTRAVENOUS; SUBCUTANEOUS at 21:33

## 2018-12-09 RX ADMIN — CEFEPIME HYDROCHLORIDE 1000 MG: 1 INJECTION, SOLUTION INTRAVENOUS at 11:22

## 2018-12-09 NOTE — ASSESSMENT & PLAN NOTE
· Initiate Oscal + Vitamin D at 1 tablet PO BID  · Outpatient follow-up with her PCP in regards to this matter

## 2018-12-09 NOTE — PROGRESS NOTES
Progress Note - Glory Situ 1/3/1924, 80 y o  female MRN: [id number]    Unit/Bed#: 877-53 Encounter: [id number]    Primary Care Provider: Emerita Hicks DO   Date and time admitted to hospital: 12/5/2018 11:27 AM        * Acute encephalopathy   Assessment & Plan    · The acute encephalopathy has improved  · Also with expressive aphasia  · Possibly secondary to acute cystitis  · Continue the stroke pathway  · An MRI of the brain was completed on 12/06/2018 with the following results/impression:  FINDINGS:     BRAIN PARENCHYMA:  Mild patchy periventricular white matter FLAIR/T2 hyperintensity suggesting chronic microangiopathic change  Appearance is similar to October  No acute infarct  No parenchymal hemorrhage  No mass  Chronic calcifications again   demonstrated within the basal ganglia      VENTRICLES AND EXTRA-AXIAL SPACES:  Mild involutional volume loss  No hydrocephalus, herniation, or mass effect  No extra-axial or intraventricular hemorrhage      SELLA AND PITUITARY GLAND:  Normal      ORBITS:  Prior bilateral lens surgeries  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  No acute orbital findings      PARANASAL SINUSES:  Mild chronic mucosal disease in the maxillary, frontal, and sphenoid sinuses as well as and the anterior ethmoid air cells  No fluid levels  No mastoid effusions      VASCULATURE:  Evaluation of the major intracranial vasculature demonstrates appropriate flow voids  MRA from the same day is reported separately      CALVARIUM AND SKULL BASE:  Normal      EXTRACRANIAL SOFT TISSUES:  Normal      IMPRESSION:     1  No acute intracranial findings or significant change from prior  Specifically, no infarct      2   Mild chronic sinus mucosal disease      · An MRA of the head was completed on 12/06/2018 with the following impression/results:  FINDINGS:     IMAGE QUALITY:  Diagnostic      ANATOMY     INTERNAL CAROTID ARTERIES:  Luminal irregularity within the cavernous segment of the internal carotids suggests atheromatous disease, also seen on CT  No flow-limiting stenoses demonstrated  Normal ICA termini      ANTERIOR CIRCULATION:  Normal A1 segments  Normal anterior communicating artery  Normal flow-related enhancement of the anterior cerebral arteries      MIDDLE CEREBRAL ARTERY CIRCULATION:  The M1 segment and middle cerebral artery branches demonstrate normal flow-related enhancement      DISTAL VERTEBRAL ARTERIES:  Congenital left dominance--and normal variant  No flow-limiting stenoses  The posterior inferior cerebellar artery origins are normal       BASILAR ARTERY:  Normal      POSTERIOR CEREBRAL ARTERIES:  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  Both arteries demonstrate normal flow-related enhancement  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      IMPRESSION:     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  No occlusive vasculopathy or aneurysms demonstrated in the intracranial circulation  · A bilateral carotid duplex was completed on 12/05/2018 with the following results/impression:  CONCLUSION:     Impression  RIGHT:  There is <50% stenosis noted in the internal carotid artery  Plaque is  heterogenous and irregular  Vertebral artery flow is antegrade  There is no significant subclavian artery  disease  LEFT:  There is <50% stenosis noted in the internal carotid artery  Plaque is  heterogenous and irregular  Vertebral artery flow is antegrade  There is no significant subclavian artery  disease  There are no priors for comparison  Challenging exam due to pronounced snoring and inability to stay awake for  exam      Internal carotid artery stenosis determination by consensus criteria from:  Ko Witt , et al  Carotid Artery Stenosis: Gray-Scale and Doppler US Diagnosis  - Society of Radiologists in [address] [place] [address], Radiology 2003;  [phone]  SIGNATURE:  Electronically Signed by: Catherine Baker on 2018-12-05 11:51:32 PM    · An echocardiogram was completed on 12/06/2018 with the following results:  SUMMARY     PROCEDURE INFORMATION:  This was a technically difficult study      LEFT VENTRICLE:  Size was normal   Systolic function was normal  Ejection fraction was estimated to be 60 %  There were no regional wall motion abnormalities  Wall thickness was at the upper limits of normal      RIGHT VENTRICLE:  The size was at the upper limits of normal      MITRAL VALVE:  There was mild annular calcification      AORTIC VALVE:  The valve was probably trileaflet  There was mild to moderate stenosis  Valve peak gradient was 25 95 mmHg  Valve mean gradient was 16 04 mmHg  Aortic valve area was 1 4 cm squared by the continuity equation      TRICUSPID VALVE:  There was mild regurgitation  Estimated peak PA pressure was 35 mmHg      PERICARDIUM:  A small pericardial effusion was identified circumferential to the heart  There was no evidence of hemodynamic compromise      HISTORY: PRIOR HISTORY: echo 10/2018, aortic stenosis     PROCEDURE: The procedure was performed at the bedside  This was a routine study  The transthoracic approach was used  The study included complete 2D imaging, M-mode, complete spectral Doppler, and color Doppler  The heart rate was 80 bpm,  at the start of the study  This was a technically difficult study      LEFT VENTRICLE: Size was normal  Systolic function was normal  Ejection fraction was estimated to be 60 %  There were no regional wall motion abnormalities  Wall thickness was at the upper limits of normal      RIGHT VENTRICLE: The size was at the upper limits of normal  Systolic function was normal  Wall thickness was normal      LEFT ATRIUM: Size was normal      RIGHT ATRIUM: Size was at the upper limits of normal      MITRAL VALVE: There was mild annular calcification   DOPPLER: There was no significant regurgitation      AORTIC VALVE: The valve was probably trileaflet  DOPPLER: There was mild to moderate stenosis  There was no significant regurgitation      TRICUSPID VALVE: DOPPLER: There was mild regurgitation  Estimated peak PA pressure was 35 mmHg      PULMONIC VALVE: Not well visualized      PERICARDIUM: A small pericardial effusion was identified circumferential to the heart  There was no evidence of hemodynamic compromise      AORTA: The root exhibited normal size      MEASUREMENT TABLES     DOPPLER MEASUREMENTS  Aortic valve   (Reference normals)  Peak gradient   25 95 mmHg   (--)  Mean gradient   16 04 mmHg   (--)  Valve area, cont   1 4 cm squared   (--)     SYSTEM MEASUREMENT TABLES     2D  %FS: 34 75 %  Ao Diam: 2 66 cm  EDV(Teich): 83 84 ml  EF(Teich): 64 23 %  ESV(Teich): 29 99 ml  IVSd: 1 24 cm  LA Diam: 3 08 cm  LVIDd: 4 32 cm  LVIDs: 2 82 cm  LVOT Diam: 1 93 cm  LVPWd: 1 01 cm  SV(Teich): 53 85 ml     CW  AV Env  Ti: 328 72 ms  AV VTI: 62 55 cm  AV Vmax: 2 57 m/s  AV Vmax: 2 55 m/s  AV Vmean: 1 9 m/s  AV maxP 51 mmHg  AV maxP 95 mmHg  AV meanP 04 mmHg  TR Vmax: 2 96 m/s  TR maxP 09 mmHg     PW  SHERMAN Vmax, Pt: 1 38 cm2  SHERMAN Vmax, Pt: 1 37 cm2  E' Sept: 0 07 m/s  E/E' Sept: 13 34  LVOT Vmax: 1 21 m/s  LVOT maxP 84 mmHg  MV A Martin: 1 15 m/s  MV Dec [place]: 6 71 m/s2  MV DecT: 133 78 ms  MV E Martin: 0 9 m/s  MV E/A Ratio: 0 78     [employer] Accredited Echocardiography Laboratory     Prepared and electronically signed by  Elvin Rivers MD  Signed 06-Dec-2018 10:36:00    · Aspirin 81 mg PO Qdaily  · High-intensity statin therapy was initiated with Atorvastatin 40 mg PO daily  · Continue telemetry monitoring to watch for any signs of atrial fibrillation/atrial flutter, which could cause an embolic CVA  · PT/OT       Dysphagia   Assessment & Plan    · Dysphagia diet with pureed solid food consistency and nectar thick liquids per speech therapy  · Nectar thick liquids with a straw  · All medications should be crushed in pureed food  · Aspiration precautions at all times       Hypernatremia   Assessment & Plan    · The sodium level is improving  · Change IV fluids to 1/2 NSS at 75 ml/hr  · Follow the sodium level     Visual disturbance   Assessment & Plan    · Continue the stroke pathway  · An MRI of the brain was completed on 12/06/2018 with the following results/impression:  FINDINGS:     BRAIN PARENCHYMA:  Mild patchy periventricular white matter FLAIR/T2 hyperintensity suggesting chronic microangiopathic change  Appearance is similar to October  No acute infarct  No parenchymal hemorrhage  No mass  Chronic calcifications again   demonstrated within the basal ganglia      VENTRICLES AND EXTRA-AXIAL SPACES:  Mild involutional volume loss  No hydrocephalus, herniation, or mass effect  No extra-axial or intraventricular hemorrhage      SELLA AND PITUITARY GLAND:  Normal      ORBITS:  Prior bilateral lens surgeries  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  No acute orbital findings      PARANASAL SINUSES:  Mild chronic mucosal disease in the maxillary, frontal, and sphenoid sinuses as well as and the anterior ethmoid air cells  No fluid levels  No mastoid effusions      VASCULATURE:  Evaluation of the major intracranial vasculature demonstrates appropriate flow voids  MRA from the same day is reported separately      CALVARIUM AND SKULL BASE:  Normal      EXTRACRANIAL SOFT TISSUES:  Normal      IMPRESSION:     1  No acute intracranial findings or significant change from prior  Specifically, no infarct      2   Mild chronic sinus mucosal disease      · An MRA of the head was completed on 12/06/2018 with the following impression/results:  FINDINGS:     IMAGE QUALITY:  Diagnostic      ANATOMY     INTERNAL CAROTID ARTERIES:  Luminal irregularity within the cavernous segment of the internal carotids suggests atheromatous disease, also seen on CT   No flow-limiting stenoses demonstrated  Normal ICA termini      ANTERIOR CIRCULATION:  Normal A1 segments  Normal anterior communicating artery  Normal flow-related enhancement of the anterior cerebral arteries      MIDDLE CEREBRAL ARTERY CIRCULATION:  The M1 segment and middle cerebral artery branches demonstrate normal flow-related enhancement      DISTAL VERTEBRAL ARTERIES:  Congenital left dominance--and normal variant  No flow-limiting stenoses  The posterior inferior cerebellar artery origins are normal       BASILAR ARTERY:  Normal      POSTERIOR CEREBRAL ARTERIES:  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  Both arteries demonstrate normal flow-related enhancement  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      IMPRESSION:     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  No occlusive vasculopathy or aneurysms demonstrated in the intracranial circulation  · A bilateral carotid duplex was completed on 12/05/2018 with the following results/impression:  CONCLUSION:     Impression  RIGHT:  There is <50% stenosis noted in the internal carotid artery  Plaque is  heterogenous and irregular  Vertebral artery flow is antegrade  There is no significant subclavian artery  disease  LEFT:  There is <50% stenosis noted in the internal carotid artery  Plaque is  heterogenous and irregular  Vertebral artery flow is antegrade  There is no significant subclavian artery  disease  There are no priors for comparison  Challenging exam due to pronounced snoring and inability to stay awake for  exam      Internal carotid artery stenosis determination by consensus criteria from:  Pati Penn , et al  Carotid Artery Stenosis: Gray-Scale and Doppler US Diagnosis  - Society of Radiologists in [address] [place] [address] [facility], Radiology 2003;  [phone]       SIGNATURE:  Electronically Signed by: Prasanna Faye on 2018-12-05 11:51:32 PM    · An echocardiogram was completed on 12/06/2018 with the following results:  SUMMARY     PROCEDURE INFORMATION:  This was a technically difficult study      LEFT VENTRICLE:  Size was normal   Systolic function was normal  Ejection fraction was estimated to be 60 %  There were no regional wall motion abnormalities  Wall thickness was at the upper limits of normal      RIGHT VENTRICLE:  The size was at the upper limits of normal      MITRAL VALVE:  There was mild annular calcification      AORTIC VALVE:  The valve was probably trileaflet  There was mild to moderate stenosis  Valve peak gradient was 25 95 mmHg  Valve mean gradient was 16 04 mmHg  Aortic valve area was 1 4 cm squared by the continuity equation      TRICUSPID VALVE:  There was mild regurgitation  Estimated peak PA pressure was 35 mmHg      PERICARDIUM:  A small pericardial effusion was identified circumferential to the heart  There was no evidence of hemodynamic compromise      HISTORY: PRIOR HISTORY: echo 10/2018, aortic stenosis     PROCEDURE: The procedure was performed at the bedside  This was a routine study  The transthoracic approach was used  The study included complete 2D imaging, M-mode, complete spectral Doppler, and color Doppler  The heart rate was 80 bpm,  at the start of the study  This was a technically difficult study      LEFT VENTRICLE: Size was normal  Systolic function was normal  Ejection fraction was estimated to be 60 %  There were no regional wall motion abnormalities  Wall thickness was at the upper limits of normal      RIGHT VENTRICLE: The size was at the upper limits of normal  Systolic function was normal  Wall thickness was normal      LEFT ATRIUM: Size was normal      RIGHT ATRIUM: Size was at the upper limits of normal      MITRAL VALVE: There was mild annular calcification  DOPPLER: There was no significant regurgitation      AORTIC VALVE: The valve was probably trileaflet   DOPPLER: There was mild to moderate stenosis  There was no significant regurgitation      TRICUSPID VALVE: DOPPLER: There was mild regurgitation  Estimated peak PA pressure was 35 mmHg      PULMONIC VALVE: Not well visualized      PERICARDIUM: A small pericardial effusion was identified circumferential to the heart  There was no evidence of hemodynamic compromise      AORTA: The root exhibited normal size      MEASUREMENT TABLES     DOPPLER MEASUREMENTS  Aortic valve   (Reference normals)  Peak gradient   25 95 mmHg   (--)  Mean gradient   16 04 mmHg   (--)  Valve area, cont   1 4 cm squared   (--)     SYSTEM MEASUREMENT TABLES     2D  %FS: 34 75 %  Ao Diam: 2 66 cm  EDV(Teich): 83 84 ml  EF(Teich): 64 23 %  ESV(Teich): 29 99 ml  IVSd: 1 24 cm  LA Diam: 3 08 cm  LVIDd: 4 32 cm  LVIDs: 2 82 cm  LVOT Diam: 1 93 cm  LVPWd: 1 01 cm  SV(Teich): 53 85 ml     CW  AV Env  Ti: 328 72 ms  AV VTI: 62 55 cm  AV Vmax: 2 57 m/s  AV Vmax: 2 55 m/s  AV Vmean: 1 9 m/s  AV maxP 51 mmHg  AV maxP 95 mmHg  AV meanP 04 mmHg  TR Vmax: 2 96 m/s  TR maxP 09 mmHg     PW  SHERMAN Vmax, Pt: 1 38 cm2  SHERMAN Vmax, Pt: 1 37 cm2  E' Sept: 0 07 m/s  E/E' Sept: 13 34  LVOT Vmax: 1 21 m/s  LVOT maxP 84 mmHg  MV A Martin: 1 15 m/s  MV Dec [place]: 6 71 m/s2  MV DecT: 133 78 ms  MV E Martin: 0 9 m/s  MV E/A Ratio: 0 78     Inters\[facility] [place] [facility]\"" Commission Accredited Echocardiography Laboratory     Prepared and electronically signed by  Vito Gallegos MD  Signed 06-Dec-2018 10:36:00    · Aspirin 81 mg PO Qdaily  · High-intensity statin therapy was initiated with Atorvastatin 40 mg PO daily  · Continue telemetry monitoring to watch for any signs of atrial fibrillation/atrial flutter, which could cause an embolic CVA  · PT/OT     Elevated TSH   Assessment & Plan    Results for Luc Cardenas (MRN [id number]) as of 2018 10:23   Ref   Range 2018 05:02   TSH 3RD GENERATON Latest Ref Range: 0 358 - 3 740 uIU/mL 5 787 (H)     · Increase levothyroxine to 100 micrograms PO Qdaily in the early morning  · Recheck a TSH level in 4-6 weeks with her PCP     Facial droop   Assessment & Plan    · Continue the stroke pathway  · An MRI of the brain was completed on 12/06/2018 with the following results/impression:  FINDINGS:     BRAIN PARENCHYMA:  Mild patchy periventricular white matter FLAIR/T2 hyperintensity suggesting chronic microangiopathic change  Appearance is similar to October  No acute infarct  No parenchymal hemorrhage  No mass  Chronic calcifications again   demonstrated within the basal ganglia      VENTRICLES AND EXTRA-AXIAL SPACES:  Mild involutional volume loss  No hydrocephalus, herniation, or mass effect  No extra-axial or intraventricular hemorrhage      SELLA AND PITUITARY GLAND:  Normal      ORBITS:  Prior bilateral lens surgeries  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  No acute orbital findings      PARANASAL SINUSES:  Mild chronic mucosal disease in the maxillary, frontal, and sphenoid sinuses as well as and the anterior ethmoid air cells  No fluid levels  No mastoid effusions      VASCULATURE:  Evaluation of the major intracranial vasculature demonstrates appropriate flow voids  MRA from the same day is reported separately      CALVARIUM AND SKULL BASE:  Normal      EXTRACRANIAL SOFT TISSUES:  Normal      IMPRESSION:     1  No acute intracranial findings or significant change from prior  Specifically, no infarct      2   Mild chronic sinus mucosal disease  · An MRA of the head was completed on 12/06/2018 with the following impression/results:  FINDINGS:     IMAGE QUALITY:  Diagnostic      ANATOMY     INTERNAL CAROTID ARTERIES:  Luminal irregularity within the cavernous segment of the internal carotids suggests atheromatous disease, also seen on CT  No flow-limiting stenoses demonstrated  Normal ICA termini      ANTERIOR CIRCULATION:  Normal A1 segments  Normal anterior communicating artery    Normal flow-related enhancement of the anterior cerebral arteries      MIDDLE CEREBRAL ARTERY CIRCULATION:  The M1 segment and middle cerebral artery branches demonstrate normal flow-related enhancement      DISTAL VERTEBRAL ARTERIES:  Congenital left dominance--and normal variant  No flow-limiting stenoses  The posterior inferior cerebellar artery origins are normal       BASILAR ARTERY:  Normal      POSTERIOR CEREBRAL ARTERIES:  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  Both arteries demonstrate normal flow-related enhancement  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      IMPRESSION:     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  No occlusive vasculopathy or aneurysms demonstrated in the intracranial circulation  · A bilateral carotid duplex was completed on 12/05/2018 with the following results/impression:  CONCLUSION:     Impression  RIGHT:  There is <50% stenosis noted in the internal carotid artery  Plaque is  heterogenous and irregular  Vertebral artery flow is antegrade  There is no significant subclavian artery  disease  LEFT:  There is <50% stenosis noted in the internal carotid artery  Plaque is  heterogenous and irregular  Vertebral artery flow is antegrade  There is no significant subclavian artery  disease  There are no priors for comparison  Challenging exam due to pronounced snoring and inability to stay awake for  exam      Internal carotid artery stenosis determination by consensus criteria from:  Mai Ritter et al  Carotid Artery Stenosis: Gray-Scale and Doppler US Diagnosis  - Society of Radiologists in [address] [place] [address], Radiology 2003;  [phone]       SIGNATURE:  Electronically Signed by: Mikayla Beck on 2018-12-05 11:51:32 PM    · An echocardiogram was completed on 12/06/2018 with the following results:  SUMMARY     PROCEDURE INFORMATION:  This was a technically difficult study      LEFT VENTRICLE:  Size was normal   Systolic function was normal  Ejection fraction was estimated to be 60 %  There were no regional wall motion abnormalities  Wall thickness was at the upper limits of normal      RIGHT VENTRICLE:  The size was at the upper limits of normal      MITRAL VALVE:  There was mild annular calcification      AORTIC VALVE:  The valve was probably trileaflet  There was mild to moderate stenosis  Valve peak gradient was 25 95 mmHg  Valve mean gradient was 16 04 mmHg  Aortic valve area was 1 4 cm squared by the continuity equation      TRICUSPID VALVE:  There was mild regurgitation  Estimated peak PA pressure was 35 mmHg      PERICARDIUM:  A small pericardial effusion was identified circumferential to the heart  There was no evidence of hemodynamic compromise      HISTORY: PRIOR HISTORY: echo 10/2018, aortic stenosis     PROCEDURE: The procedure was performed at the bedside  This was a routine study  The transthoracic approach was used  The study included complete 2D imaging, M-mode, complete spectral Doppler, and color Doppler  The heart rate was 80 bpm,  at the start of the study  This was a technically difficult study      LEFT VENTRICLE: Size was normal  Systolic function was normal  Ejection fraction was estimated to be 60 %  There were no regional wall motion abnormalities  Wall thickness was at the upper limits of normal      RIGHT VENTRICLE: The size was at the upper limits of normal  Systolic function was normal  Wall thickness was normal      LEFT ATRIUM: Size was normal      RIGHT ATRIUM: Size was at the upper limits of normal      MITRAL VALVE: There was mild annular calcification  DOPPLER: There was no significant regurgitation      AORTIC VALVE: The valve was probably trileaflet  DOPPLER: There was mild to moderate stenosis  There was no significant regurgitation      TRICUSPID VALVE: DOPPLER: There was mild regurgitation   Estimated peak PA pressure was 35 mmHg      PULMONIC VALVE: Not well visualized      PERICARDIUM: A small pericardial effusion was identified circumferential to the heart  There was no evidence of hemodynamic compromise      AORTA: The root exhibited normal size      MEASUREMENT TABLES     DOPPLER MEASUREMENTS  Aortic valve   (Reference normals)  Peak gradient   25 95 mmHg   (--)  Mean gradient   16 04 mmHg   (--)  Valve area, cont   1 4 cm squared   (--)     SYSTEM MEASUREMENT TABLES     2D  %FS: 34 75 %  Ao Diam: 2 66 cm  EDV(Teich): 83 84 ml  EF(Teich): 64 23 %  ESV(Teich): 29 99 ml  IVSd: 1 24 cm  LA Diam: 3 08 cm  LVIDd: 4 32 cm  LVIDs: 2 82 cm  LVOT Diam: 1 93 cm  LVPWd: 1 01 cm  SV(Teich): 53 85 ml     CW  AV Env  Ti: 328 72 ms  AV VTI: 62 55 cm  AV Vmax: 2 57 m/s  AV Vmax: 2 55 m/s  AV Vmean: 1 9 m/s  AV maxP 51 mmHg  AV maxP 95 mmHg  AV meanP 04 mmHg  TR Vmax: 2 96 m/s  TR maxP 09 mmHg     PW  SHERMAN Vmax, Pt: 1 38 cm2  SHERMAN Vmax, Pt: 1 37 cm2  E' Sept: 0 07 m/s  E/E' Sept: 13 34  LVOT Vmax: 1 21 m/s  LVOT maxP 84 mmHg  MV A Martin: 1 15 m/s  MV Dec [place]: 6 71 m/s2  MV DecT: 133 78 ms  MV E Martin: 0 9 m/s  MV E/A Ratio: 0 78     [facility] Accredited E[facility] Laboratory     Prepared and electronically signed by  Rigo Ibarra MD  Signed 06-Dec-2018 10:36:00    · Aspirin 81 mg PO Qdaily  · High-intensity statin therapy was initiated with Atorvastatin 40 mg PO daily  · Continue telemetry monitoring to watch for any signs of atrial fibrillation/atrial flutter, which could cause an embolic CVA  · PT/OT     Acute cystitis with hematuria   Assessment & Plan    Microbiology Results (last 21 days)     Procedure Component Value - Date/Time   Urine culture [[id number]] (Abnormal)  Collected: 18 1718   Lab Status: Final result Specimen: Urine from Urine, Clean Catch Updated: 18 1618    Urine Culture 40,000-49,000 cfu/ml Pseudomonas aeruginosa (A)     10,000-19,000 cfu/ml Proteus mirabilis (A)   Susceptibility Pseudomonas aeruginosa     JACK    Aztreonam ($$$)  <4 ug/ml"><4 ug/ml Susceptible    Cefepime ($) <2 00 ug/ml"><2 00 ug/ml Susceptible    Ceftazidime ($$) 2 ug/ml Susceptible    Ciprofloxacin ($)  <1 00 ug/ml"><1 00 ug/ml Susceptible    Gentamicin ($$) 4 ug/ml Susceptible    Imipenem 1 ug/ml Susceptible    Levofloxacin ($) 0 50 ug/ml Susceptible    Meropenem ($$) <1 00 ug/ml"><1 00 ug/ml Susceptible    Piperacillin + Tazobactam ($$$) <4 ug/ml"><4 ug/ml Susceptible    Tobramycin ($) 2 ug/ml Susceptible    ZID Performed Yes               Susceptibility      Proteus mirabilis     JACK    Amikacin ($$) <16 ug/ml"><16 ug/ml Susceptible    Ampicillin ($$) <8 00 ug/ml"><8 00 ug/ml Susceptible    Aztreonam ($$$)  <4 ug/ml"><4 ug/ml Susceptible    Cefazolin ($) <2 00 ug/ml"><2 00 ug/ml Susceptible    Ciprofloxacin ($)  >2 00 ug/ml Resistant    Gentamicin ($$) 8 ug/ml Intermediate    Levofloxacin ($) 4 00 ug/ml Intermediate    Nitrofurantoin >64 ug/ml Resistant    Tetracycline >8 ug/ml Resistant    Tobramycin ($) 4 ug/ml Susceptible    Trimethoprim + Sulfamethoxazole ($$$) >2/38 ug/ml Resistant    ZID Performed Yes              · Change antibiotics to IV Cefepime based on the urine culture results     Hypocalcemia   Assessment & Plan    · Initiate Oscal + Vitamin D at 1 tablet PO BID  · Outpatient follow-up with her PCP in regards to this matter     Hypophosphatemia   Assessment & Plan    · Potassium phosphate 12 mmol IV x 1 dose today, 12/09/2018  · Follow the phosphorus level     Pericardial effusion   Assessment & Plan    · No evidence of hemodynamic compromise on echocardiogram  · Recheck an echocardiogram in 1 month to monitor the pericardial effusion     Pulmonary hypertension (HCC)   Assessment & Plan    · Outpatient follow-up with Pulmonology     Aortic valve stenosis   Assessment & Plan    · Outpatient follow-up with Cardiology     Right bundle branch block   Assessment & Plan    · Outpatient follow-up with Cardiology     CKD (chronic kidney disease) stage 4, GFR 15-29 ml/min ([facility])   Assessment & Plan    · Baseline creatinine of 1 1-1 3  · Her creatinine has improved to her baseline   · Change IV fluids to 1/2 NSS at 75 ml/hr  · Avoid all nephrotoxic agents  · Continue serial laboratory testing to monitor her renal function and electrolytes     Macrocytic anemia   Assessment & Plan    · Follow the CBC  · Transfuse for hemoglobin less than 7  Results for Ramez Goddard (MRN [id number]) as of 2018 10:00   Ref  Range 2018 05:02   Folate Latest Ref Range: 3 1 - 17 5 ng/mL >20 0 (H)   Vitamin B-12 Latest Ref Range: 100 - 900 pg/mL 1,342 (H)        Acquired hypothyroidism   Assessment & Plan    Results for Ramez Goddard (MRN [id number]) as of 2018 10:23   Ref  Range 2018 05:02   TSH 3RD GENERATON Latest Ref Range: 0 358 - 3 740 uIU/mL 5 787 (H)     · Increase levothyroxine to 100 micrograms PO Qdaily in the early morning  · Recheck a TSH level in 4-6 weeks with her PCP         VTE Pharmacologic Prophylaxis:   Pharmacologic: Heparin  Mechanical VTE Prophylaxis in Place: Yes    Time Spent for Care: 20 minutes  More than 50% of total time spent on counseling and coordination of care as described above  Current Length of Stay: 4 day(s)    Current Patient Status: Inpatient   Certification Statement: The patient will continue to require additional inpatient hospital stay due to the need for IV antibiotics, the need for continuous IV fluids, and the need for serial laboratory testing to monitor her sodium level  Code Status: Level 3 - DNAR and DNI      Subjective: The patient was seen and examined  The patient is doing better  No chest pain  No shortness of breath      Objective:     Vitals:   Temp (24hrs), Av 1 °F (36 7 °C), Min:97 8 °F (36 6 °C), Max:98 4 °F (36 9 °C)    Temp:  [97 8 °F (36 6 °C)-98 4 °F (36 9 °C)] 98 1 °F (36 7 °C)  HR:  [72-76] 72  Resp:  [18-20] 20  BP: (126-132)/(60-65) 132/60  SpO2:  [94 %-97 %] 94 %  Body mass index is 28 98 kg/m²  Input and Output Summary (last 24 hours): Intake/Output Summary (Last 24 hours) at 12/09/18 0954  Last data filed at 12/09/18 0141   Gross per 24 hour   Intake             1080 ml   Output                1 ml   Net             1079 ml       Physical Exam:     Physical Exam  General:  NAD, lethargic, awakens to verbal stimuli  HEENT:  NC/AT, mucous membranes dry  Neck:  Supple, No JVP elevation  CV:  + S1, + S2, RRR  Pulm:  Lung fields are CTA bilaterally  Abd:  Soft, Non-tender, Non-distended  Ext:  No clubbing/cyanosis/edema  Skin:  No rashes    Additional Data:    Labs:      Results from last 7 days  Lab Units 12/09/18  0436 12/08/18  0512   WBC Thousand/uL 6 06 7 13   HEMOGLOBIN g/dL 9 9* 10 4*   HEMATOCRIT % 32 4* 33 9*   PLATELETS Thousands/uL 278 289   NEUTROS PCT %  --  61   LYMPHS PCT %  --  25   MONOS PCT %  --  8   EOS PCT %  --  4       Results from last 7 days  Lab Units 12/09/18  0436   SODIUM mmol/L 145   POTASSIUM mmol/L 4 4   CHLORIDE mmol/L 110*   CO2 mmol/L 30   BUN mg/dL 20   CREATININE mg/dL 1 10   ANION GAP mmol/L 5   CALCIUM mg/dL 7 5*   ALBUMIN g/dL 2 2*   TOTAL BILIRUBIN mg/dL 0 20   ALK PHOS U/L 64   ALT U/L 21   AST U/L 19   GLUCOSE RANDOM mg/dL 90       Results from last 7 days  Lab Units 12/05/18  1131   INR  0 98           Results from last 7 days  Lab Units 12/06/18  0502   HEMOGLOBIN A1C % 5 2       Results from last 7 days  Lab Units 12/08/18  0511   PROCALCITONIN ng/ml 0 08           * I Have Reviewed All Lab Data Listed Above  * Additional Pertinent Lab Tests Reviewed:  Romaine 66 Admission Reviewed        Recent Cultures (last 7 days):       Results from last 7 days  Lab Units 12/05/18  1715   URINE CULTURE  40,000-49,000 cfu/ml Pseudomonas aeruginosa*  10,000-19,000 cfu/ml Proteus mirabilis*       Last 24 Hours Medication List:     Current Facility-Administered Medications:  acetaminophen 650 mg Oral Q6H PRN Mati Rodriguez DO   aspirin 81 mg Oral Daily Mati Rodriguez DO   atorvastatin 40 mg Oral QPM Mati Rodriguez DO   calcium carbonate-vitamin D 1 tablet Oral BID With Meals Mati Rodriguez DO   cefepime 1,000 mg Intravenous Q24H Mati Rodriguez DO   collagenase  Topical BID Mati Rodriguez DO   ferrous sulfate 325 mg Oral BID With Meals Mati Rodriguez DO   heparin (porcine) 5,000 Units Subcutaneous [facility] Mati Rodriguez DO   levothyroxine 100 mcg Oral Early Morning Mati Rodriguez DO   multivitamin-minerals 1 tablet Oral Daily Mati Rodriguez DO   ondansetron 4 mg Intravenous Q4H PRN Mati Rodriguez DO   potassium phosphate 12 mmol Intravenous Once Mati Rodriguez DO   sodium chloride 75 mL/hr Intravenous Continuous Mati Rodriguez DO        Today, Patient Was Seen By: Mati Rodriguez DO    ** Please Note: Dictation voice to text software may have been used in the creation of this document   **

## 2018-12-09 NOTE — ASSESSMENT & PLAN NOTE
Microbiology Results (last 21 days)     Procedure Component Value - Date/Time   Urine culture [[id number]] (Abnormal)  Collected: 12/05/18 1715   Lab Status: Final result Specimen: Urine from Urine, Clean Catch Updated: 12/08/18 1618    Urine Culture 40,000-49,000 cfu/ml Pseudomonas aeruginosa (A)     10,000-19,000 cfu/ml Proteus mirabilis (A)   Susceptibility      Pseudomonas aeruginosa     JACK    Aztreonam ($$$)  <4 ug/ml"><4 ug/ml Susceptible    Cefepime ($) <2 00 ug/ml"><2 00 ug/ml Susceptible    Ceftazidime ($$) 2 ug/ml Susceptible    Ciprofloxacin ($)  <1 00 ug/ml"><1 00 ug/ml Susceptible    Gentamicin ($$) 4 ug/ml Susceptible    Imipenem 1 ug/ml Susceptible    Levofloxacin ($) 0 50 ug/ml Susceptible    Meropenem ($$) <1 00 ug/ml"><1 00 ug/ml Susceptible    Piperacillin + Tazobactam ($$$) <4 ug/ml"><4 ug/ml Susceptible    Tobramycin ($) 2 ug/ml Susceptible    ZID Performed Yes               Susceptibility      Proteus mirabilis     JACK    Amikacin ($$) <16 ug/ml"><16 ug/ml Susceptible    Ampicillin ($$) <8 00 ug/ml"><8 00 ug/ml Susceptible    Aztreonam ($$$)  <4 ug/ml"><4 ug/ml Susceptible    Cefazolin ($) <2 00 ug/ml"><2 00 ug/ml Susceptible    Ciprofloxacin ($)  >2 00 ug/ml Resistant    Gentamicin ($$) 8 ug/ml Intermediate    Levofloxacin ($) 4 00 ug/ml Intermediate    Nitrofurantoin >64 ug/ml Resistant    Tetracycline >8 ug/ml Resistant    Tobramycin ($) 4 ug/ml Susceptible    Trimethoprim + Sulfamethoxazole ($$$) >2/38 ug/ml Resistant    ZID Performed Yes              · Change antibiotics to IV Cefepime based on the urine culture results

## 2018-12-09 NOTE — ASSESSMENT & PLAN NOTE
Results for James Alarcon (MRN [id number]) as of 12/6/2018 10:23   Ref   Range 12/6/2018 05:02   TSH 3RD GENERATON Latest Ref Range: 0 358 - 3 740 uIU/mL 5 787 (H)     · Increase levothyroxine to 100 micrograms PO Qdaily in the early morning  · Recheck a TSH level in 4-6 weeks with her PCP

## 2018-12-09 NOTE — ASSESSMENT & PLAN NOTE
· Follow the CBC  · Transfuse for hemoglobin less than 7  Results for Fabricio Calix (MRN [id number]) as of 12/7/2018 10:00   Ref   Range 12/6/2018 05:02   Folate Latest Ref Range: 3 1 - 17 5 ng/mL >20 0 (H)   Vitamin B-12 Latest Ref Range: 100 - 900 pg/mL 1,342 (H)

## 2018-12-09 NOTE — ASSESSMENT & PLAN NOTE
· Continue the stroke pathway  · An MRI of the brain was completed on 12/06/2018 with the following results/impression:  FINDINGS:     BRAIN PARENCHYMA:  Mild patchy periventricular white matter FLAIR/T2 hyperintensity suggesting chronic microangiopathic change  Appearance is similar to October  No acute infarct  No parenchymal hemorrhage  No mass  Chronic calcifications again   demonstrated within the basal ganglia      VENTRICLES AND EXTRA-AXIAL SPACES:  Mild involutional volume loss  No hydrocephalus, herniation, or mass effect  No extra-axial or intraventricular hemorrhage      SELLA AND PITUITARY GLAND:  Normal      ORBITS:  Prior bilateral lens surgeries  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  No acute orbital findings      PARANASAL SINUSES:  Mild chronic mucosal disease in the maxillary, frontal, and sphenoid sinuses as well as and the anterior ethmoid air cells  No fluid levels  No mastoid effusions      VASCULATURE:  Evaluation of the major intracranial vasculature demonstrates appropriate flow voids  MRA from the same day is reported separately      CALVARIUM AND SKULL BASE:  Normal      EXTRACRANIAL SOFT TISSUES:  Normal      IMPRESSION:     1  No acute intracranial findings or significant change from prior  Specifically, no infarct      2   Mild chronic sinus mucosal disease  · An MRA of the head was completed on 12/06/2018 with the following impression/results:  FINDINGS:     IMAGE QUALITY:  Diagnostic      ANATOMY     INTERNAL CAROTID ARTERIES:  Luminal irregularity within the cavernous segment of the internal carotids suggests atheromatous disease, also seen on CT  No flow-limiting stenoses demonstrated  Normal ICA termini      ANTERIOR CIRCULATION:  Normal A1 segments  Normal anterior communicating artery    Normal flow-related enhancement of the anterior cerebral arteries      MIDDLE CEREBRAL ARTERY CIRCULATION:  The M1 segment and middle cerebral artery branches demonstrate normal flow-related enhancement      DISTAL VERTEBRAL ARTERIES:  Congenital left dominance--and normal variant  No flow-limiting stenoses  The posterior inferior cerebellar artery origins are normal       BASILAR ARTERY:  Normal      POSTERIOR CEREBRAL ARTERIES:  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  Both arteries demonstrate normal flow-related enhancement  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      IMPRESSION:     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  No occlusive vasculopathy or aneurysms demonstrated in the intracranial circulation  · A bilateral carotid duplex was completed on 12/05/2018 with the following results/impression:  CONCLUSION:     Impression  RIGHT:  There is <50% stenosis noted in the internal carotid artery  Plaque is  heterogenous and irregular  Vertebral artery flow is antegrade  There is no significant subclavian artery  disease  LEFT:  There is <50% stenosis noted in the internal carotid artery  Plaque is  heterogenous and irregular  Vertebral artery flow is antegrade  There is no significant subclavian artery  disease  There are no priors for comparison  Challenging exam due to pronounced snoring and inability to stay awake for  exam      Internal carotid artery stenosis determination by consensus criteria from:  Justino Venegas , et al  Carotid Artery Stenosis: Gray-Scale and Doppler US Diagnosis  - Society of Radiologists in [address] [place] [address], Radiology 2003;  [phone]  SIGNATURE:  Electronically Signed by: Nakia Wakefield on 2018-12-05 11:51:32 PM    · An echocardiogram was completed on 12/06/2018 with the following results:  SUMMARY     PROCEDURE INFORMATION:  This was a technically difficult study      LEFT VENTRICLE:  Size was normal   Systolic function was normal  Ejection fraction was estimated to be 60 %    There were no regional wall motion abnormalities  Wall thickness was at the upper limits of normal      RIGHT VENTRICLE:  The size was at the upper limits of normal      MITRAL VALVE:  There was mild annular calcification      AORTIC VALVE:  The valve was probably trileaflet  There was mild to moderate stenosis  Valve peak gradient was 25 95 mmHg  Valve mean gradient was 16 04 mmHg  Aortic valve area was 1 4 cm squared by the continuity equation      TRICUSPID VALVE:  There was mild regurgitation  Estimated peak PA pressure was 35 mmHg      PERICARDIUM:  A small pericardial effusion was identified circumferential to the heart  There was no evidence of hemodynamic compromise      HISTORY: PRIOR HISTORY: echo 10/2018, aortic stenosis     PROCEDURE: The procedure was performed at the bedside  This was a routine study  The transthoracic approach was used  The study included complete 2D imaging, M-mode, complete spectral Doppler, and color Doppler  The heart rate was 80 bpm,  at the start of the study  This was a technically difficult study      LEFT VENTRICLE: Size was normal  Systolic function was normal  Ejection fraction was estimated to be 60 %  There were no regional wall motion abnormalities  Wall thickness was at the upper limits of normal      RIGHT VENTRICLE: The size was at the upper limits of normal  Systolic function was normal  Wall thickness was normal      LEFT ATRIUM: Size was normal      RIGHT ATRIUM: Size was at the upper limits of normal      MITRAL VALVE: There was mild annular calcification  DOPPLER: There was no significant regurgitation      AORTIC VALVE: The valve was probably trileaflet  DOPPLER: There was mild to moderate stenosis  There was no significant regurgitation      TRICUSPID VALVE: DOPPLER: There was mild regurgitation  Estimated peak PA pressure was 35 mmHg      PULMONIC VALVE: Not well visualized      PERICARDIUM: A small pericardial effusion was identified circumferential to the heart   There was no evidence of hemodynamic compromise      AORTA: The root exhibited normal size      MEASUREMENT TABLES     DOPPLER MEASUREMENTS  Aortic valve   (Reference normals)  Peak gradient   25 95 mmHg   (--)  Mean gradient   16 04 mmHg   (--)  Valve area, cont   1 4 cm squared   (--)     SYSTEM MEASUREMENT TABLES     2D  %FS: 34 75 %  Ao Diam: 2 66 cm  EDV(Teich): 83 84 ml  EF(Teich): 64 23 %  ESV(Teich): 29 99 ml  IVSd: 1 24 cm  LA Diam: 3 08 cm  LVIDd: 4 32 cm  LVIDs: 2 82 cm  LVOT Diam: 1 93 cm  LVPWd: 1 01 cm  SV(Teich): 53 85 ml     CW  AV Env  Ti: 328 72 ms  AV VTI: 62 55 cm  AV Vmax: 2 57 m/s  AV Vmax: 2 55 m/s  AV Vmean: 1 9 m/s  AV maxP 51 mmHg  AV maxP 95 mmHg  AV meanP 04 mmHg  TR Vmax: 2 96 m/s  TR maxP 09 mmHg     PW  SHERMAN Vmax, Pt: 1 38 cm2  SHERMAN Vmax, Pt: 1 37 cm2  E' Sept: 0 07 m/s  E/E' Sept: 13 34  LVOT Vmax: 1 21 m/s  LVOT maxP 84 mmHg  MV A Martin: 1 15 m/s  MV Dec Bartow: 6 71 m/s2  MV DecT: 133 78 ms  MV E Martin: 0 9 m/s  MV E/A Ratio: 0 78     In[employer]oci[employer] Commission Accredited Echocardiography Laboratory     Prepared and electronically signed by  Vladislav Slef MD  Signed 06-Dec-2018 10:36:00    · Aspirin 81 mg PO Qdaily  · High-intensity statin therapy was initiated with Atorvastatin 40 mg PO daily  · Continue telemetry monitoring to watch for any signs of atrial fibrillation/atrial flutter, which could cause an embolic CVA  · PT/OT

## 2018-12-09 NOTE — ASSESSMENT & PLAN NOTE
· The sodium level is improving  · Change IV fluids to 1/2 NSS at 75 ml/hr  · Follow the sodium level

## 2018-12-09 NOTE — ASSESSMENT & PLAN NOTE
· Baseline creatinine of 1 1-1 3  · Her creatinine has improved to her baseline   · Change IV fluids to 1/2 NSS at 75 ml/hr  · Avoid all nephrotoxic agents  · Continue serial laboratory testing to monitor her renal function and electrolytes

## 2018-12-09 NOTE — ASSESSMENT & PLAN NOTE
· Continue the stroke pathway  · An MRI of the brain was completed on 12/06/2018 with the following results/impression:  FINDINGS:     BRAIN PARENCHYMA:  Mild patchy periventricular white matter FLAIR/T2 hyperintensity suggesting chronic microangiopathic change  Appearance is similar to October  No acute infarct  No parenchymal hemorrhage  No mass  Chronic calcifications again   demonstrated within the basal ganglia      VENTRICLES AND EXTRA-AXIAL SPACES:  Mild involutional volume loss  No hydrocephalus, herniation, or mass effect  No extra-axial or intraventricular hemorrhage      SELLA AND PITUITARY GLAND:  Normal      ORBITS:  Prior bilateral lens surgeries  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  No acute orbital findings      PARANASAL SINUSES:  Mild chronic mucosal disease in the maxillary, frontal, and sphenoid sinuses as well as and the anterior ethmoid air cells  No fluid levels  No mastoid effusions      VASCULATURE:  Evaluation of the major intracranial vasculature demonstrates appropriate flow voids  MRA from the same day is reported separately      CALVARIUM AND SKULL BASE:  Normal      EXTRACRANIAL SOFT TISSUES:  Normal      IMPRESSION:     1  No acute intracranial findings or significant change from prior  Specifically, no infarct      2   Mild chronic sinus mucosal disease  · An MRA of the head was completed on 12/06/2018 with the following impression/results:  FINDINGS:     IMAGE QUALITY:  Diagnostic      ANATOMY     INTERNAL CAROTID ARTERIES:  Luminal irregularity within the cavernous segment of the internal carotids suggests atheromatous disease, also seen on CT  No flow-limiting stenoses demonstrated  Normal ICA termini      ANTERIOR CIRCULATION:  Normal A1 segments  Normal anterior communicating artery    Normal flow-related enhancement of the anterior cerebral arteries      MIDDLE CEREBRAL ARTERY CIRCULATION:  The M1 segment and middle cerebral artery branches demonstrate normal flow-related enhancement      DISTAL VERTEBRAL ARTERIES:  Congenital left dominance--and normal variant  No flow-limiting stenoses  The posterior inferior cerebellar artery origins are normal       BASILAR ARTERY:  Normal      POSTERIOR CEREBRAL ARTERIES:  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  Both arteries demonstrate normal flow-related enhancement  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      IMPRESSION:     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  No occlusive vasculopathy or aneurysms demonstrated in the intracranial circulation  · A bilateral carotid duplex was completed on 12/05/2018 with the following results/impression:  CONCLUSION:     Impression  RIGHT:  There is <50% stenosis noted in the internal carotid artery  Plaque is  heterogenous and irregular  Vertebral artery flow is antegrade  There is no significant subclavian artery  disease  LEFT:  There is <50% stenosis noted in the internal carotid artery  Plaque is  heterogenous and irregular  Vertebral artery flow is antegrade  There is no significant subclavian artery  disease  There are no priors for comparison  Challenging exam due to pronounced snoring and inability to stay awake for  exam      Internal carotid artery stenosis determination by consensus criteria from:  Drew Ortiz et al  Carotid Artery Stenosis: Gray-Scale and Doppler US Diagnosis  - Society of Radiologists in [address] [place] [address], Radiology 2003;  [phone]  SIGNATURE:  Electronically Signed by: Anna Marie Poe on 2018-12-05 11:51:32 PM    · An echocardiogram was completed on 12/06/2018 with the following results:  SUMMARY     PROCEDURE INFORMATION:  This was a technically difficult study      LEFT VENTRICLE:  Size was normal   Systolic function was normal  Ejection fraction was estimated to be 60 %    There were no regional wall motion abnormalities  Wall thickness was at the upper limits of normal      RIGHT VENTRICLE:  The size was at the upper limits of normal      MITRAL VALVE:  There was mild annular calcification      AORTIC VALVE:  The valve was probably trileaflet  There was mild to moderate stenosis  Valve peak gradient was 25 95 mmHg  Valve mean gradient was 16 04 mmHg  Aortic valve area was 1 4 cm squared by the continuity equation      TRICUSPID VALVE:  There was mild regurgitation  Estimated peak PA pressure was 35 mmHg      PERICARDIUM:  A small pericardial effusion was identified circumferential to the heart  There was no evidence of hemodynamic compromise      HISTORY: PRIOR HISTORY: echo 10/2018, aortic stenosis     PROCEDURE: The procedure was performed at the bedside  This was a routine study  The transthoracic approach was used  The study included complete 2D imaging, M-mode, complete spectral Doppler, and color Doppler  The heart rate was 80 bpm,  at the start of the study  This was a technically difficult study      LEFT VENTRICLE: Size was normal  Systolic function was normal  Ejection fraction was estimated to be 60 %  There were no regional wall motion abnormalities  Wall thickness was at the upper limits of normal      RIGHT VENTRICLE: The size was at the upper limits of normal  Systolic function was normal  Wall thickness was normal      LEFT ATRIUM: Size was normal      RIGHT ATRIUM: Size was at the upper limits of normal      MITRAL VALVE: There was mild annular calcification  DOPPLER: There was no significant regurgitation      AORTIC VALVE: The valve was probably trileaflet  DOPPLER: There was mild to moderate stenosis  There was no significant regurgitation      TRICUSPID VALVE: DOPPLER: There was mild regurgitation  Estimated peak PA pressure was 35 mmHg      PULMONIC VALVE: Not well visualized      PERICARDIUM: A small pericardial effusion was identified circumferential to the heart   There was no evidence of hemodynamic compromise      AORTA: The root exhibited normal size      MEASUREMENT TABLES     DOPPLER MEASUREMENTS  Aortic valve   (Reference normals)  Peak gradient   25 95 mmHg   (--)  Mean gradient   16 04 mmHg   (--)  Valve area, cont   1 4 cm squared   (--)     SYSTEM MEASUREMENT TABLES     2D  %FS: 34 75 %  Ao Diam: 2 66 cm  EDV(Teich): 83 84 ml  EF(Teich): 64 23 %  ESV(Teich): 29 99 ml  IVSd: 1 24 cm  LA Diam: 3 08 cm  LVIDd: 4 32 cm  LVIDs: 2 82 cm  LVOT Diam: 1 93 cm  LVPWd: 1 01 cm  SV(Teich): 53 85 ml     CW  AV Env  Ti: 328 72 ms  AV VTI: 62 55 cm  AV Vmax: 2 57 m/s  AV Vmax: 2 55 m/s  AV Vmean: 1 9 m/s  AV maxP 51 mmHg  AV maxP 95 mmHg  AV meanP 04 mmHg  TR Vmax: 2 96 m/s  TR maxP 09 mmHg     PW  SHERMAN Vmax, Pt: 1 38 cm2  SHERMAN Vmax, Pt: 1 37 cm2  E' Sept: 0 07 m/s  E/E' Sept: 13 34  LVOT Vmax: 1 21 m/s  LVOT maxP 84 mmHg  MV A Martin: 1 15 m/s  MV Dec [place]ford: 6 71 m/s2  MV DecT: 133 78 ms  MV E Martin: 0 9 m/s  MV E/A Ratio: 0 78     [employer] Accredited Echocardiography Laboratory     Prepared and electronically signed by  Lauryn Baird MD  Signed 06-Dec-2018 10:36:00    · Aspirin 81 mg PO Qdaily  · High-intensity statin therapy was initiated with Atorvastatin 40 mg PO daily  · Continue telemetry monitoring to watch for any signs of atrial fibrillation/atrial flutter, which could cause an embolic CVA  · PT/OT

## 2018-12-09 NOTE — ASSESSMENT & PLAN NOTE
· The acute encephalopathy has improved  · Also with expressive aphasia  · Possibly secondary to acute cystitis  · Continue the stroke pathway  · An MRI of the brain was completed on 12/06/2018 with the following results/impression:  FINDINGS:     BRAIN PARENCHYMA:  Mild patchy periventricular white matter FLAIR/T2 hyperintensity suggesting chronic microangiopathic change  Appearance is similar to October  No acute infarct  No parenchymal hemorrhage  No mass  Chronic calcifications again   demonstrated within the basal ganglia      VENTRICLES AND EXTRA-AXIAL SPACES:  Mild involutional volume loss  No hydrocephalus, herniation, or mass effect  No extra-axial or intraventricular hemorrhage      SELLA AND PITUITARY GLAND:  Normal      ORBITS:  Prior bilateral lens surgeries  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  No acute orbital findings      PARANASAL SINUSES:  Mild chronic mucosal disease in the maxillary, frontal, and sphenoid sinuses as well as and the anterior ethmoid air cells  No fluid levels  No mastoid effusions      VASCULATURE:  Evaluation of the major intracranial vasculature demonstrates appropriate flow voids  MRA from the same day is reported separately      CALVARIUM AND SKULL BASE:  Normal      EXTRACRANIAL SOFT TISSUES:  Normal      IMPRESSION:     1  No acute intracranial findings or significant change from prior  Specifically, no infarct      2   Mild chronic sinus mucosal disease  · An MRA of the head was completed on 12/06/2018 with the following impression/results:  FINDINGS:     IMAGE QUALITY:  Diagnostic      ANATOMY     INTERNAL CAROTID ARTERIES:  Luminal irregularity within the cavernous segment of the internal carotids suggests atheromatous disease, also seen on CT  No flow-limiting stenoses demonstrated  Normal ICA termini      ANTERIOR CIRCULATION:  Normal A1 segments  Normal anterior communicating artery    Normal flow-related enhancement of the anterior cerebral arteries      MIDDLE CEREBRAL ARTERY CIRCULATION:  The M1 segment and middle cerebral artery branches demonstrate normal flow-related enhancement      DISTAL VERTEBRAL ARTERIES:  Congenital left dominance--and normal variant  No flow-limiting stenoses  The posterior inferior cerebellar artery origins are normal       BASILAR ARTERY:  Normal      POSTERIOR CEREBRAL ARTERIES:  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  Both arteries demonstrate normal flow-related enhancement  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      IMPRESSION:     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  No occlusive vasculopathy or aneurysms demonstrated in the intracranial circulation  · A bilateral carotid duplex was completed on 12/05/2018 with the following results/impression:  CONCLUSION:     Impression  RIGHT:  There is <50% stenosis noted in the internal carotid artery  Plaque is  heterogenous and irregular  Vertebral artery flow is antegrade  There is no significant subclavian artery  disease  LEFT:  There is <50% stenosis noted in the internal carotid artery  Plaque is  heterogenous and irregular  Vertebral artery flow is antegrade  There is no significant subclavian artery  disease  There are no priors for comparison  Challenging exam due to pronounced snoring and inability to stay awake for  exam      Internal carotid artery stenosis determination by consensus criteria from:  Wilfrid Ny , et al  Carotid Artery Stenosis: Gray-Scale and Doppler US Diagnosis  - Society of Radiologists in [address] [place] [address], Radiology 2003;  [phone]       SIGNATURE:  Electronically Signed by: Cristobal Negron on 2018-12-05 11:51:32 PM    · An echocardiogram was completed on 12/06/2018 with the following results:  SUMMARY     PROCEDURE INFORMATION:  This was a technically difficult study      LEFT VENTRICLE:  Size was normal   Systolic function was normal  Ejection fraction was estimated to be 60 %  There were no regional wall motion abnormalities  Wall thickness was at the upper limits of normal      RIGHT VENTRICLE:  The size was at the upper limits of normal      MITRAL VALVE:  There was mild annular calcification      AORTIC VALVE:  The valve was probably trileaflet  There was mild to moderate stenosis  Valve peak gradient was 25 95 mmHg  Valve mean gradient was 16 04 mmHg  Aortic valve area was 1 4 cm squared by the continuity equation      TRICUSPID VALVE:  There was mild regurgitation  Estimated peak PA pressure was 35 mmHg      PERICARDIUM:  A small pericardial effusion was identified circumferential to the heart  There was no evidence of hemodynamic compromise      HISTORY: PRIOR HISTORY: echo 10/2018, aortic stenosis     PROCEDURE: The procedure was performed at the bedside  This was a routine study  The transthoracic approach was used  The study included complete 2D imaging, M-mode, complete spectral Doppler, and color Doppler  The heart rate was 80 bpm,  at the start of the study  This was a technically difficult study      LEFT VENTRICLE: Size was normal  Systolic function was normal  Ejection fraction was estimated to be 60 %  There were no regional wall motion abnormalities  Wall thickness was at the upper limits of normal      RIGHT VENTRICLE: The size was at the upper limits of normal  Systolic function was normal  Wall thickness was normal      LEFT ATRIUM: Size was normal      RIGHT ATRIUM: Size was at the upper limits of normal      MITRAL VALVE: There was mild annular calcification  DOPPLER: There was no significant regurgitation      AORTIC VALVE: The valve was probably trileaflet  DOPPLER: There was mild to moderate stenosis  There was no significant regurgitation      TRICUSPID VALVE: DOPPLER: There was mild regurgitation   Estimated peak PA pressure was 35 mmHg      PULMONIC VALVE: Not well visualized      PERICARDIUM: A small pericardial effusion was identified circumferential to the heart  There was no evidence of hemodynamic compromise      AORTA: The root exhibited normal size      MEASUREMENT TABLES     DOPPLER MEASUREMENTS  Aortic valve   (Reference normals)  Peak gradient   25 95 mmHg   (--)  Mean gradient   16 04 mmHg   (--)  Valve area, cont   1 4 cm squared   (--)     SYSTEM MEASUREMENT TABLES     2D  %FS: 34 75 %  Ao Diam: 2 66 cm  EDV(Teich): 83 84 ml  EF(Teich): 64 23 %  ESV(Teich): 29 99 ml  IVSd: 1 24 cm  LA Diam: 3 08 cm  LVIDd: 4 32 cm  LVIDs: 2 82 cm  LVOT Diam: 1 93 cm  LVPWd: 1 01 cm  SV(Teich): 53 85 ml     CW  AV Env  Ti: 328 72 ms  AV VTI: 62 55 cm  AV Vmax: 2 57 m/s  AV Vmax: 2 55 m/s  AV Vmean: 1 9 m/s  AV maxP 51 mmHg  AV maxP 95 mmHg  AV meanP 04 mmHg  TR Vmax: 2 96 m/s  TR maxP 09 mmHg     PW  SHERMAN Vmax, Pt: 1 38 cm2  SHERMAN Vmax, Pt: 1 37 cm2  E' Sept: 0 07 m/s  E/E' Sept: 13 34  LVOT Vmax: 1 21 m/s  LVOT maxP 84 mmHg  MV A Martin: 1 15 m/s  MV Dec [place]: 6 71 m/s2  MV DecT: 133 78 ms  MV E Martin: 0 9 m/s  MV E/A Ratio: 0 78     [facility] Accredited Echocardiography Laboratory     Prepared and electronically signed by  Gorge Braxton MD  Signed 06-Dec-2018 10:36:00    · Aspirin 81 mg PO Qdaily  · High-intensity statin therapy was initiated with Atorvastatin 40 mg PO daily  · Continue telemetry monitoring to watch for any signs of atrial fibrillation/atrial flutter, which could cause an embolic CVA  · PT/OT

## 2018-12-10 PROBLEM — R13.10 DYSPHAGIA: Status: RESOLVED | Noted: 2018-12-07 | Resolved: 2018-12-10

## 2018-12-10 PROBLEM — R79.89 ELEVATED TSH: Status: RESOLVED | Noted: 2018-12-06 | Resolved: 2018-12-10

## 2018-12-10 PROBLEM — E83.51 HYPOCALCEMIA: Status: RESOLVED | Noted: 2018-12-09 | Resolved: 2018-12-10

## 2018-12-10 PROBLEM — E83.39 HYPOPHOSPHATEMIA: Status: RESOLVED | Noted: 2018-12-08 | Resolved: 2018-12-10

## 2018-12-10 PROBLEM — R29.810 FACIAL DROOP: Status: RESOLVED | Noted: 2018-12-05 | Resolved: 2018-12-10

## 2018-12-10 PROBLEM — E87.0 HYPERNATREMIA: Status: RESOLVED | Noted: 2018-12-07 | Resolved: 2018-12-10

## 2018-12-10 LAB
25(OH)D3 SERPL-MCNC: 21.3 NG/ML (ref 30–100)
ALBUMIN SERPL BCP-MCNC: 2.3 G/DL (ref 3.5–5)
ALP SERPL-CCNC: 70 U/L (ref 46–116)
ALT SERPL W P-5'-P-CCNC: 20 U/L (ref 12–78)
ANION GAP SERPL CALCULATED.3IONS-SCNC: 5 MMOL/L (ref 4–13)
AST SERPL W P-5'-P-CCNC: 17 U/L (ref 5–45)
BILIRUB SERPL-MCNC: 0.2 MG/DL (ref 0.2–1)
BUN SERPL-MCNC: 13 MG/DL (ref 5–25)
CALCIUM SERPL-MCNC: 7.4 MG/DL (ref 8.3–10.1)
CHLORIDE SERPL-SCNC: 110 MMOL/L (ref 100–108)
CO2 SERPL-SCNC: 30 MMOL/L (ref 21–32)
CREAT SERPL-MCNC: 0.99 MG/DL (ref 0.6–1.3)
ERYTHROCYTE [DISTWIDTH] IN BLOOD BY AUTOMATED COUNT: 14.6 % (ref 11.6–15.1)
GFR SERPL CREATININE-BSD FRML MDRD: 49 ML/MIN/1.73SQ M
GLUCOSE SERPL-MCNC: 85 MG/DL (ref 65–140)
HCT VFR BLD AUTO: 34.1 % (ref 34.8–46.1)
HGB BLD-MCNC: 10.5 G/DL (ref 11.5–15.4)
MAGNESIUM SERPL-MCNC: 2.1 MG/DL (ref 1.6–2.6)
MCH RBC QN AUTO: 31 PG (ref 26.8–34.3)
MCHC RBC AUTO-ENTMCNC: 30.8 G/DL (ref 31.4–37.4)
MCV RBC AUTO: 101 FL (ref 82–98)
PHOSPHATE SERPL-MCNC: 2.1 MG/DL (ref 2.3–4.1)
PLATELET # BLD AUTO: 264 THOUSANDS/UL (ref 149–390)
PMV BLD AUTO: 9.4 FL (ref 8.9–12.7)
POTASSIUM SERPL-SCNC: 4.7 MMOL/L (ref 3.5–5.3)
PROCALCITONIN SERPL-MCNC: 0.08 NG/ML
PROT SERPL-MCNC: 5.8 G/DL (ref 6.4–8.2)
RBC # BLD AUTO: 3.39 MILLION/UL (ref 3.81–5.12)
SODIUM SERPL-SCNC: 145 MMOL/L (ref 136–145)
WBC # BLD AUTO: 7.46 THOUSAND/UL (ref 4.31–10.16)

## 2018-12-10 PROCEDURE — 99232 SBSQ HOSP IP/OBS MODERATE 35: CPT | Performed by: INTERNAL MEDICINE

## 2018-12-10 PROCEDURE — 84145 PROCALCITONIN (PCT): CPT | Performed by: INTERNAL MEDICINE

## 2018-12-10 PROCEDURE — 83735 ASSAY OF MAGNESIUM: CPT | Performed by: INTERNAL MEDICINE

## 2018-12-10 PROCEDURE — 80053 COMPREHEN METABOLIC PANEL: CPT | Performed by: INTERNAL MEDICINE

## 2018-12-10 PROCEDURE — 92526 ORAL FUNCTION THERAPY: CPT | Performed by: SPEECH-LANGUAGE PATHOLOGIST

## 2018-12-10 PROCEDURE — 82306 VITAMIN D 25 HYDROXY: CPT | Performed by: INTERNAL MEDICINE

## 2018-12-10 PROCEDURE — 97116 GAIT TRAINING THERAPY: CPT

## 2018-12-10 PROCEDURE — 97530 THERAPEUTIC ACTIVITIES: CPT

## 2018-12-10 PROCEDURE — 84100 ASSAY OF PHOSPHORUS: CPT | Performed by: INTERNAL MEDICINE

## 2018-12-10 PROCEDURE — G8996 SWALLOW CURRENT STATUS: HCPCS | Performed by: SPEECH-LANGUAGE PATHOLOGIST

## 2018-12-10 PROCEDURE — 85027 COMPLETE CBC AUTOMATED: CPT | Performed by: INTERNAL MEDICINE

## 2018-12-10 RX ADMIN — CALCIUM CARBONATE-VITAMIN D TAB 500 MG-200 UNIT 1 TABLET: 500-200 TAB at 18:03

## 2018-12-10 RX ADMIN — MULTIPLE VITAMINS W/ MINERALS TAB 1 TABLET: TAB at 09:33

## 2018-12-10 RX ADMIN — COLLAGENASE SANTYL: 250 OINTMENT TOPICAL at 09:42

## 2018-12-10 RX ADMIN — SODIUM CHLORIDE 75 ML/HR: 0.45 INJECTION, SOLUTION INTRAVENOUS at 03:42

## 2018-12-10 RX ADMIN — CEFEPIME HYDROCHLORIDE 1000 MG: 1 INJECTION, SOLUTION INTRAVENOUS at 12:00

## 2018-12-10 RX ADMIN — HEPARIN SODIUM 5000 UNITS: 5000 INJECTION, SOLUTION INTRAVENOUS; SUBCUTANEOUS at 15:23

## 2018-12-10 RX ADMIN — CALCIUM CARBONATE-VITAMIN D TAB 500 MG-200 UNIT 1 TABLET: 500-200 TAB at 09:33

## 2018-12-10 RX ADMIN — FERROUS SULFATE TAB 325 MG (65 MG ELEMENTAL FE) 325 MG: 325 (65 FE) TAB at 18:03

## 2018-12-10 RX ADMIN — HEPARIN SODIUM 5000 UNITS: 5000 INJECTION, SOLUTION INTRAVENOUS; SUBCUTANEOUS at 05:39

## 2018-12-10 RX ADMIN — COLLAGENASE SANTYL: 250 OINTMENT TOPICAL at 18:12

## 2018-12-10 RX ADMIN — ASPIRIN 81 MG 81 MG: 81 TABLET ORAL at 09:33

## 2018-12-10 RX ADMIN — HEPARIN SODIUM 5000 UNITS: 5000 INJECTION, SOLUTION INTRAVENOUS; SUBCUTANEOUS at 21:41

## 2018-12-10 RX ADMIN — ATORVASTATIN CALCIUM 40 MG: 40 TABLET, FILM COATED ORAL at 18:03

## 2018-12-10 RX ADMIN — LEVOTHYROXINE SODIUM 100 MCG: 100 TABLET ORAL at 05:39

## 2018-12-10 RX ADMIN — FERROUS SULFATE TAB 325 MG (65 MG ELEMENTAL FE) 325 MG: 325 (65 FE) TAB at 09:33

## 2018-12-10 NOTE — SPEECH THERAPY NOTE
Speech Language/Pathology    Speech/Language Pathology Progress Note    Patient Name: Lisbet Solano  [id number] Date: 12/10/2018     Problem List  Patient Active Problem List   Diagnosis    Chronic respiratory failure with hypoxia (HCC)    Acquired hypothyroidism    Acute encephalopathy    Generalized weakness    Presbycusis    Slow transit constipation    Acute cystitis with hematuria    Metabolic encephalopathy    Visual disturbance    Macrocytic anemia    CKD (chronic kidney disease) stage 4, GFR 15-29 ml/min ([facility])    Right bundle branch block    Aortic valve stenosis    Pulmonary hypertension ([facility] )    Pericardial effusion        Past Medical History  Past Medical History:   Diagnosis Date    Anemia     Arthritis     Cancer ([facility] )     breast    Disease of thyroid gland     Hyperlipidemia     IBS (irritable bowel syndrome)     Overactive bladder     Peripheral neuropathy     Polio     Poliomyelitis     Renal disorder     Rhabdomyolysis     UTI (urinary tract infection)     Ventral hernia         Past Surgical History  Past Surgical History:   Procedure Laterality Date    APPENDECTOMY      BREAST SURGERY      right mastectomy         Subjective:  Patient alert and oriented  Upright in chair at bedside  Verbally interactive  Objective:  RN reports patient has been tolerating current diet level with improvement over the past 2 days  Patient reports her bottom dentures move around; but she wears her top ones  She reports she can't chew very well because of her dentures  Assessment:  Patient wishes to wear her upper dentures, not her lower  She is agreeable to cup sips nectar thick independently taken, and is without s/s and WFL during oral and pharyngeal phases  Patient w/thin sip (x2) is w/reduced control and dumps to pharynx w/suspect of air ingestion and subsequent throat clear  Patient is [facility] w/puree trials   She refused any crackers or cut up fruit to trial; as she says "it's hard for me to chew"  Staff is feeding the patient for monitoring of tolerance  Plan/Recommendations:  As patient requests; we will continue puree diet level and nectar thick liquids  Discussed w/RN and PCA to continue and to continue to help w/feeding patient

## 2018-12-10 NOTE — SOCIAL WORK
As per MD pt is not medically for discharge  Alton Cross at [facility] [place] [place] notified of same

## 2018-12-10 NOTE — PROGRESS NOTES
Progress Note - Maye Horn 1/3/1924, 80 y o  female MRN: [id number]    Unit/Bed#: 565-64 Encounter: [id number]    Primary Care Provider: Saeid Kimble DO   Date and time admitted to hospital: 12/5/2018 11:27 AM        * Acute encephalopathy   Assessment & Plan    · The acute encephalopathy has improved  · Seems to have improved  · Possibly secondary to acute cystitis  · Continue the stroke pathway  · An MRI of the brain was completed on 12/06/2018 with the following results/impression:  FINDINGS:     BRAIN PARENCHYMA:  Mild patchy periventricular white matter FLAIR/T2 hyperintensity suggesting chronic microangiopathic change  Appearance is similar to October  No acute infarct  No parenchymal hemorrhage  No mass  Chronic calcifications again   demonstrated within the basal ganglia      VENTRICLES AND EXTRA-AXIAL SPACES:  Mild involutional volume loss  No hydrocephalus, herniation, or mass effect  No extra-axial or intraventricular hemorrhage      SELLA AND PITUITARY GLAND:  Normal      ORBITS:  Prior bilateral lens surgeries  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  No acute orbital findings      PARANASAL SINUSES:  Mild chronic mucosal disease in the maxillary, frontal, and sphenoid sinuses as well as and the anterior ethmoid air cells  No fluid levels  No mastoid effusions      VASCULATURE:  Evaluation of the major intracranial vasculature demonstrates appropriate flow voids  MRA from the same day is reported separately      CALVARIUM AND SKULL BASE:  Normal      EXTRACRANIAL SOFT TISSUES:  Normal      IMPRESSION:     1  No acute intracranial findings or significant change from prior  Specifically, no infarct      2   Mild chronic sinus mucosal disease      · An MRA of the head was completed on 12/06/2018 with the following impression/results:  FINDINGS:     IMAGE QUALITY:  Diagnostic      ANATOMY     INTERNAL CAROTID ARTERIES:  Luminal irregularity within the cavernous segment of the internal carotids suggests atheromatous disease, also seen on CT  No flow-limiting stenoses demonstrated  Normal ICA termini      ANTERIOR CIRCULATION:  Normal A1 segments  Normal anterior communicating artery  Normal flow-related enhancement of the anterior cerebral arteries      MIDDLE CEREBRAL ARTERY CIRCULATION:  The M1 segment and middle cerebral artery branches demonstrate normal flow-related enhancement      DISTAL VERTEBRAL ARTERIES:  Congenital left dominance--and normal variant  No flow-limiting stenoses  The posterior inferior cerebellar artery origins are normal       BASILAR ARTERY:  Normal      POSTERIOR CEREBRAL ARTERIES:  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  Both arteries demonstrate normal flow-related enhancement  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      IMPRESSION:     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  No occlusive vasculopathy or aneurysms demonstrated in the intracranial circulation  · A bilateral carotid duplex was completed on 12/05/2018 with the following results/impression:  CONCLUSION:     Impression  RIGHT:  There is <50% stenosis noted in the internal carotid artery  Plaque is  heterogenous and irregular  Vertebral artery flow is antegrade  There is no significant subclavian artery  disease  LEFT:  There is <50% stenosis noted in the internal carotid artery  Plaque is  heterogenous and irregular  Vertebral artery flow is antegrade  There is no significant subclavian artery  disease  There are no priors for comparison  Challenging exam due to pronounced snoring and inability to stay awake for  exam      Internal carotid artery stenosis determination by consensus criteria from:  Becky Fernando , et al  Carotid Artery Stenosis: Gray-Scale and Doppler US Diagnosis  - Society of Radiologists in [address] [place] [address], Radiology 2003;  [phone]  SIGNATURE:  Electronically Signed by: Margaritaorquideaaayush Ryland on 2018-12-05 11:51:32 PM    · An echocardiogram was completed on 12/06/2018 with the following results:  SUMMARY     PROCEDURE INFORMATION:  This was a technically difficult study      LEFT VENTRICLE:  Size was normal   Systolic function was normal  Ejection fraction was estimated to be 60 %  There were no regional wall motion abnormalities  Wall thickness was at the upper limits of normal      RIGHT VENTRICLE:  The size was at the upper limits of normal      MITRAL VALVE:  There was mild annular calcification      AORTIC VALVE:  The valve was probably trileaflet  There was mild to moderate stenosis  Valve peak gradient was 25 95 mmHg  Valve mean gradient was 16 04 mmHg  Aortic valve area was 1 4 cm squared by the continuity equation      TRICUSPID VALVE:  There was mild regurgitation  Estimated peak PA pressure was 35 mmHg      PERICARDIUM:  A small pericardial effusion was identified circumferential to the heart  There was no evidence of hemodynamic compromise      HISTORY: PRIOR HISTORY: echo 10/2018, aortic stenosis     PROCEDURE: The procedure was performed at the bedside  This was a routine study  The transthoracic approach was used  The study included complete 2D imaging, M-mode, complete spectral Doppler, and color Doppler  The heart rate was 80 bpm,  at the start of the study  This was a technically difficult study      LEFT VENTRICLE: Size was normal  Systolic function was normal  Ejection fraction was estimated to be 60 %  There were no regional wall motion abnormalities  Wall thickness was at the upper limits of normal      RIGHT VENTRICLE: The size was at the upper limits of normal  Systolic function was normal  Wall thickness was normal      LEFT ATRIUM: Size was normal      RIGHT ATRIUM: Size was at the upper limits of normal      MITRAL VALVE: There was mild annular calcification   DOPPLER: There was no significant regurgitation      AORTIC VALVE: The valve was probably trileaflet  DOPPLER: There was mild to moderate stenosis  There was no significant regurgitation      TRICUSPID VALVE: DOPPLER: There was mild regurgitation  Estimated peak PA pressure was 35 mmHg      PULMONIC VALVE: Not well visualized      PERICARDIUM: A small pericardial effusion was identified circumferential to the heart  There was no evidence of hemodynamic compromise      AORTA: The root exhibited normal size      MEASUREMENT TABLES     DOPPLER MEASUREMENTS  Aortic valve   (Reference normals)  Peak gradient   25 95 mmHg   (--)  Mean gradient   16 04 mmHg   (--)  Valve area, cont   1 4 cm squared   (--)     SYSTEM MEASUREMENT TABLES     2D  %FS: 34 75 %  Ao Diam: 2 66 cm  EDV(Teich): 83 84 ml  EF(Teich): 64 23 %  ESV(Teich): 29 99 ml  IVSd: 1 24 cm  LA Diam: 3 08 cm  LVIDd: 4 32 cm  LVIDs: 2 82 cm  LVOT Diam: 1 93 cm  LVPWd: 1 01 cm  SV(Teich): 53 85 ml     CW  AV Env  Ti: 328 72 ms  AV VTI: 62 55 cm  AV Vmax: 2 57 m/s  AV Vmax: 2 55 m/s  AV Vmean: 1 9 m/s  AV maxP 51 mmHg  AV maxP 95 mmHg  AV meanP 04 mmHg  TR Vmax: 2 96 m/s  TR maxP 09 mmHg     PW  SHERMAN Vmax, Pt: 1 38 cm2  SHERMAN Vmax, Pt: 1 37 cm2  E' Sept: 0 07 m/s  E/E' Sept: 13 34  LVOT Vmax: 1 21 m/s  LVOT maxP 84 mmHg  MV A Martin: 1 15 m/s  MV Dec [place]: 6 71 m/s2  MV DecT: 133 78 ms  MV E Martin: 0 9 m/s  MV E/A Ratio: 0 78     [employer] Accredited Echocardiography Laboratory     Prepared and electronically signed by  Ochoa Decker MD  Signed 06-Dec-2018 10:36:00    · Aspirin 81 mg PO Qdaily  · High-intensity statin therapy was initiated with Atorvastatin 40 mg PO daily  · PT/OT       Acute cystitis with hematuria   Assessment & Plan    Microbiology Results (last 21 days)     Procedure Component Value - Date/Time   Urine culture [[id number]] (Abnormal)  Collected: 18 1850   Lab Status: Final result Specimen: Urine from Urine, Clean Catch Updated: 18 1618    Urine Culture 40,000-49,000 cfu/ml Pseudomonas aeruginosa (A)     10,000-19,000 cfu/ml Proteus mirabilis (A)   Susceptibility      Pseudomonas aeruginosa     JACK    Aztreonam ($$$)  <4 ug/ml"><4 ug/ml Susceptible    Cefepime ($) <2 00 ug/ml"><2 00 ug/ml Susceptible    Ceftazidime ($$) 2 ug/ml Susceptible    Ciprofloxacin ($)  <1 00 ug/ml"><1 00 ug/ml Susceptible    Gentamicin ($$) 4 ug/ml Susceptible    Imipenem 1 ug/ml Susceptible    Levofloxacin ($) 0 50 ug/ml Susceptible    Meropenem ($$) <1 00 ug/ml"><1 00 ug/ml Susceptible    Piperacillin + Tazobactam ($$$) <4 ug/ml"><4 ug/ml Susceptible    Tobramycin ($) 2 ug/ml Susceptible    ZID Performed Yes               Susceptibility      Proteus mirabilis     JACK    Amikacin ($$) <16 ug/ml"><16 ug/ml Susceptible    Ampicillin ($$) <8 00 ug/ml"><8 00 ug/ml Susceptible    Aztreonam ($$$)  <4 ug/ml"><4 ug/ml Susceptible    Cefazolin ($) <2 00 ug/ml"><2 00 ug/ml Susceptible    Ciprofloxacin ($)  >2 00 ug/ml Resistant    Gentamicin ($$) 8 ug/ml Intermediate    Levofloxacin ($) 4 00 ug/ml Intermediate    Nitrofurantoin >64 ug/ml Resistant    Tetracycline >8 ug/ml Resistant    Tobramycin ($) 4 ug/ml Susceptible    Trimethoprim + Sulfamethoxazole ($$$) >2/38 ug/ml Resistant    ZID Performed Yes              · Patient appears to have clinically improved after changing antibiotics to IV cefepime, will complete a 5 day course  Acquired hypothyroidism   Assessment & Plan    Results for Casandra Disla (MRN [id number]) as of 12/6/2018 10:23   Ref   Range 12/6/2018 05:02   TSH 3RD GENERATON Latest Ref Range: 0 358 - 3 740 uIU/mL 5 787 (H)     · Increase levothyroxine to 100 micrograms PO Qdaily in the early morning  · Recheck a TSH level in 4-6 weeks with her PCP     Aortic valve stenosis   Assessment & Plan    · Outpatient follow-up with Cardiology     CKD (chronic kidney disease) stage 4, GFR 15-29 ml/min ([facility])   Assessment & Plan    · Baseline creatinine of 1 1-1 3  · Her creatinine has improved to her baseline   · Change IV fluids to 1/2 NSS at 75 ml/hr  · Avoid all nephrotoxic agents  · Continue serial laboratory testing to monitor her renal function and electrolytes       VTE Pharmacologic Prophylaxis:       Code Status: Level 3 - DNAR and DNI      Subjective:   No pain, patient seen examined with the physical therapy and occupational therapy team, they both note that there is marked improvement today in overall mental status and mobility, case discussed with Nursing  Objective:     Vitals:   Temp (24hrs), Av 3 °F (36 3 °C), Min:97 °F (36 1 °C), Max:97 6 °F (36 4 °C)    Temp:  [97 °F (36 1 °C)-97 6 °F (36 4 °C)] 97 6 °F (36 4 °C)  HR:  [65-77] 77  Resp:  [18] 18  BP: (123-141)/(61-91) 123/61  SpO2:  [97 %] 97 %  Body mass index is 29 29 kg/m²  Input and Output Summary (last 24 hours): Intake/Output Summary (Last 24 hours) at 12/10/18 1346  Last data filed at 12/10/18 1207   Gross per 24 hour   Intake             1390 ml   Output              225 ml   Net             1165 ml       Physical Exam:     Physical Exam   Constitutional: She appears well-developed and well-nourished  No distress  Cardiovascular: Normal rate  Pulmonary/Chest: Effort normal and breath sounds normal  No respiratory distress  She has no wheezes  Abdominal: Soft  Bowel sounds are normal  She exhibits no distension  There is no tenderness  Neurological: She is alert  No cranial nerve deficit  Coordination normal    Skin: She is not diaphoretic  Nursing note and vitals reviewed        Additional Data:     Labs:      Results from last 7 days  Lab Units 12/10/18  0529  18  0512   WBC Thousand/uL 7 46  < > 7 13   HEMOGLOBIN g/dL 10 5*  < > 10 4*   HEMATOCRIT % 34 1*  < > 33 9*   PLATELETS Thousands/uL 264  < > 289   NEUTROS PCT %  --   --  61   LYMPHS PCT %  --   --  25   MONOS PCT %  --   --  8   EOS PCT %  --   --  4   < > = values in this interval not displayed  Results from last 7 days  Lab Units 12/10/18  0529  12/05/18  1200   POTASSIUM mmol/L 4 7  < >  --    CHLORIDE mmol/L 110*  < >  --    CO2 mmol/L 30  < >  --    CO2, I-STAT mmol/L  --   --  32   BUN mg/dL 13  < >  --    CREATININE mg/dL 0 99  < >  --    CALCIUM mg/dL 7 4*  < >  --    ALK PHOS U/L 70  < >  --    ALT U/L 20  < >  --    AST U/L 17  < >  --    GLUCOSE, ISTAT mg/dl  --   --  75   < > = values in this interval not displayed  Results from last 7 days  Lab Units 12/05/18  1131   INR  0 98       * I Have Reviewed All Lab Data Listed Above  * Additional Pertinent Lab Tests Reviewed:  Romaine 66 Admission Reviewed        Recent Cultures (last 7 days):       Results from last 7 days  Lab Units 12/05/18  1715   URINE CULTURE  40,000-49,000 cfu/ml Pseudomonas aeruginosa*  10,000-19,000 cfu/ml Proteus mirabilis*       Last 24 Hours Medication List:     Current Facility-Administered Medications:  acetaminophen 650 mg Oral Q6H PRN Rosaland Litter, DO    aspirin 81 mg Oral Daily Rosaland Litter, DO    atorvastatin 40 mg Oral QPM Rosaland Litter, DO    calcium carbonate-vitamin D 1 tablet Oral BID With Meals Rosaland Litter, DO    cefepime 1,000 mg Intravenous Q24H Rosaland Litter, DO Last Rate: 1,000 mg (12/10/18 1200)   collagenase  Topical BID Rosaland Litter, DO    ferrous sulfate 325 mg Oral BID With Meals Rosaland Litter, DO    heparin (porcine) 5,000 Units Subcutaneous Q8H [address] Endy Tapia,     levothyroxine 100 mcg Oral Early Morning Rosaland Litter, DO    multivitamin-minerals 1 tablet Oral Daily Endy Tapia DO    ondansetron 4 mg Intravenous Q4H PRN Rosaland Litter, DO         Today, Patient Was Seen By: Juanita Rosas DO

## 2018-12-10 NOTE — PHYSICAL THERAPY NOTE
PT Treatment Note      12/10/18 1304   Restrictions/Precautions   Other Precautions (Aspiration;Multiple lines;Telemetry;O2;Fall Risk)   Cognition   Arousal/Participation Alert; Cooperative   Following Commands Follows one step commands without difficulty   Subjective   Subjective Agreeable to therapy  Reprots she is feeling better  Bed Mobility   Additional Comments see OT note for bed mobility   Transfers   Sit to Stand 4  Minimal assistance   Additional items Assist x 2;Verbal cues; Increased time required   Stand to Sit 4  Minimal assistance   Additional items Assist x 2;Armrests; Verbal cues; Increased time required   Stand pivot 4  Minimal assistance   Additional items Assist x 2;Verbal cues   Ambulation/Elevation   Gait pattern Forward Flexion; Short stride   Gait Assistance 4  Minimal assist   Additional items Assist x 2   Assistive Device Rolling walker   Distance 25'   Balance   Static Sitting Fair +   Dynamic Sitting Fair +   Static Standing 19 Sudha Brothers -  (with RW)   Endurance Deficit   Endurance Deficit Yes   Activity Tolerance   Activity Tolerance Patient limited by fatigue   Assessment   Prognosis Good   Problem List Decreased strength;Decreased endurance; Impaired balance;Decreased mobility   Assessment Pt  currently performing tx/ambulation at (min) x 2 level of function  Demonstrates improved functional mobility  Productive cough and moderate SOB with activity  Pt is in need of continued activity in PT to improve safety balance endurance strength mobility transfers and short distance ambulation  Plan   Treatment/Interventions Functional transfer training;LE strengthening/ROM; Therapeutic exercise; Endurance training;Bed mobility;Gait training   Progress Progressing toward goals   Recommendation   Recommendation Long-term skilled PT;Long-term skilled nursing home placement   Pt  OOB in chair   with call bell within reach, scd's connected and turned on and alarm on at end of PT session  Discussed with Ivania Arango, PT today's treatment and patient's current level of function for care coordination

## 2018-12-10 NOTE — SOCIAL WORK
Called Serafin Canseco at [employer] for patient to return to [place]  home today  Auth number is [id number]

## 2018-12-10 NOTE — PLAN OF CARE
Problem: OCCUPATIONAL THERAPY ADULT  Goal: Performs self-care activities at highest level of function for planned discharge setting  See evaluation for individualized goals  Treatment Interventions: ADL retraining, Functional transfer training, UE strengthening/ROM, Endurance training, Cognitive reorientation, Patient/family training, Activityengagement          See flowsheet documentation for full assessment, interventions and recommendations      Outcome: Progressing  Limitation: Decreased ADL status, Decreased UE strength, Decreased Safe judgement during ADL, Decreased cognition, Decreased endurance, Decreased self-care trans, Decreased high-level ADLs, Decreased UE ROM     Assessment: pt presents supine in bed reporting incontinence of urine requiring new brief; pt performed rolling and bed mobility at min (A) x1-2 with cues; pt performed functional mobility this session, which she was unable to perform in previous session with min (A) x1-2 with decreased endurance of 25ft; pt demonstrates motivation to perform and seated in chair at end of session; pt will benefit from continued OT intervention during hospital admission to maximize (I) with mobility     OT Discharge Recommendation: 24 hour supervision/assist

## 2018-12-10 NOTE — PLAN OF CARE
Problem: PHYSICAL THERAPY ADULT  Goal: Performs mobility at highest level of function for planned discharge setting  See evaluation for individualized goals  Outcome: Progressing  Prognosis: Good  Problem List: Decreased strength, Decreased endurance, Impaired balance, Decreased mobility  Assessment: Pt  currently performing tx/ambulation at (min) x 2 level of function  Demonstrates improved functional mobility  Productive cough and moderate SOB with activity  Pt is in need of continued activity in PT to improve safety balance endurance strength mobility transfers and short distance ambulation  Recommendation: Long-term skilled PT, Long-term skilled nursing home placement     PT - OK to Discharge: No (when medically stable for discharge)    See flowsheet documentation for full assessment

## 2018-12-10 NOTE — ASSESSMENT & PLAN NOTE
Microbiology Results (last 21 days)     Procedure Component Value - Date/Time   Urine culture [[id number]] (Abnormal)  Collected: 12/05/18 1715   Lab Status: Final result Specimen: Urine from Urine, Clean Catch Updated: 12/08/18 1618    Urine Culture 40,000-49,000 cfu/ml Pseudomonas aeruginosa (A)     10,000-19,000 cfu/ml Proteus mirabilis (A)   Susceptibility      Pseudomonas aeruginosa     JACK    Aztreonam ($$$)  <4 ug/ml"><4 ug/ml Susceptible    Cefepime ($) <2 00 ug/ml"><2 00 ug/ml Susceptible    Ceftazidime ($$) 2 ug/ml Susceptible    Ciprofloxacin ($)  <1 00 ug/ml"><1 00 ug/ml Susceptible    Gentamicin ($$) 4 ug/ml Susceptible    Imipenem 1 ug/ml Susceptible    Levofloxacin ($) 0 50 ug/ml Susceptible    Meropenem ($$) <1 00 ug/ml"><1 00 ug/ml Susceptible    Piperacillin + Tazobactam ($$$) <4 ug/ml"><4 ug/ml Susceptible    Tobramycin ($) 2 ug/ml Susceptible    ZID Performed Yes               Susceptibility      Proteus mirabilis     JACK    Amikacin ($$) <16 ug/ml"><16 ug/ml Susceptible    Ampicillin ($$) <8 00 ug/ml"><8 00 ug/ml Susceptible    Aztreonam ($$$)  <4 ug/ml"><4 ug/ml Susceptible    Cefazolin ($) <2 00 ug/ml"><2 00 ug/ml Susceptible    Ciprofloxacin ($)  >2 00 ug/ml Resistant    Gentamicin ($$) 8 ug/ml Intermediate    Levofloxacin ($) 4 00 ug/ml Intermediate    Nitrofurantoin >64 ug/ml Resistant    Tetracycline >8 ug/ml Resistant    Tobramycin ($) 4 ug/ml Susceptible    Trimethoprim + Sulfamethoxazole ($$$) >2/38 ug/ml Resistant    ZID Performed Yes              · Patient appears to have clinically improved after changing antibiotics to IV cefepime, will complete a 5 day course

## 2018-12-10 NOTE — ASSESSMENT & PLAN NOTE
· The acute encephalopathy has improved  · Seems to have improved  · Possibly secondary to acute cystitis  · Continue the stroke pathway  · An MRI of the brain was completed on 12/06/2018 with the following results/impression:  FINDINGS:     BRAIN PARENCHYMA:  Mild patchy periventricular white matter FLAIR/T2 hyperintensity suggesting chronic microangiopathic change  Appearance is similar to October  No acute infarct  No parenchymal hemorrhage  No mass  Chronic calcifications again   demonstrated within the basal ganglia      VENTRICLES AND EXTRA-AXIAL SPACES:  Mild involutional volume loss  No hydrocephalus, herniation, or mass effect  No extra-axial or intraventricular hemorrhage      SELLA AND PITUITARY GLAND:  Normal      ORBITS:  Prior bilateral lens surgeries  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  No acute orbital findings      PARANASAL SINUSES:  Mild chronic mucosal disease in the maxillary, frontal, and sphenoid sinuses as well as and the anterior ethmoid air cells  No fluid levels  No mastoid effusions      VASCULATURE:  Evaluation of the major intracranial vasculature demonstrates appropriate flow voids  MRA from the same day is reported separately      CALVARIUM AND SKULL BASE:  Normal      EXTRACRANIAL SOFT TISSUES:  Normal      IMPRESSION:     1  No acute intracranial findings or significant change from prior  Specifically, no infarct      2   Mild chronic sinus mucosal disease  · An MRA of the head was completed on 12/06/2018 with the following impression/results:  FINDINGS:     IMAGE QUALITY:  Diagnostic      ANATOMY     INTERNAL CAROTID ARTERIES:  Luminal irregularity within the cavernous segment of the internal carotids suggests atheromatous disease, also seen on CT  No flow-limiting stenoses demonstrated  Normal ICA termini      ANTERIOR CIRCULATION:  Normal A1 segments  Normal anterior communicating artery    Normal flow-related enhancement of the anterior cerebral arteries      MIDDLE CEREBRAL ARTERY CIRCULATION:  The M1 segment and middle cerebral artery branches demonstrate normal flow-related enhancement      DISTAL VERTEBRAL ARTERIES:  Congenital left dominance--and normal variant  No flow-limiting stenoses  The posterior inferior cerebellar artery origins are normal       BASILAR ARTERY:  Normal      POSTERIOR CEREBRAL ARTERIES:  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  Both arteries demonstrate normal flow-related enhancement  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      IMPRESSION:     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  No occlusive vasculopathy or aneurysms demonstrated in the intracranial circulation  · A bilateral carotid duplex was completed on 12/05/2018 with the following results/impression:  CONCLUSION:     Impression  RIGHT:  There is <50% stenosis noted in the internal carotid artery  Plaque is  heterogenous and irregular  Vertebral artery flow is antegrade  There is no significant subclavian artery  disease  LEFT:  There is <50% stenosis noted in the internal carotid artery  Plaque is  heterogenous and irregular  Vertebral artery flow is antegrade  There is no significant subclavian artery  disease  There are no priors for comparison  Challenging exam due to pronounced snoring and inability to stay awake for  exam      Internal carotid artery stenosis determination by consensus criteria from:  Carson Wei et al  Carotid Artery Stenosis: Gray-Scale and Doppler US Diagnosis  - Society of Radiologists in [address] [place] [address], Radiology 2003;  [phone]       SIGNATURE:  Electronically Signed by: Jacy Marcelino on 2018-12-05 11:51:32 PM    · An echocardiogram was completed on 12/06/2018 with the following results:  SUMMARY     PROCEDURE INFORMATION:  This was a technically difficult study      LEFT VENTRICLE:  Size was normal   Systolic function was normal  Ejection fraction was estimated to be 60 %  There were no regional wall motion abnormalities  Wall thickness was at the upper limits of normal      RIGHT VENTRICLE:  The size was at the upper limits of normal      MITRAL VALVE:  There was mild annular calcification      AORTIC VALVE:  The valve was probably trileaflet  There was mild to moderate stenosis  Valve peak gradient was 25 95 mmHg  Valve mean gradient was 16 04 mmHg  Aortic valve area was 1 4 cm squared by the continuity equation      TRICUSPID VALVE:  There was mild regurgitation  Estimated peak PA pressure was 35 mmHg      PERICARDIUM:  A small pericardial effusion was identified circumferential to the heart  There was no evidence of hemodynamic compromise      HISTORY: PRIOR HISTORY: echo 10/2018, aortic stenosis     PROCEDURE: The procedure was performed at the bedside  This was a routine study  The transthoracic approach was used  The study included complete 2D imaging, M-mode, complete spectral Doppler, and color Doppler  The heart rate was 80 bpm,  at the start of the study  This was a technically difficult study      LEFT VENTRICLE: Size was normal  Systolic function was normal  Ejection fraction was estimated to be 60 %  There were no regional wall motion abnormalities  Wall thickness was at the upper limits of normal      RIGHT VENTRICLE: The size was at the upper limits of normal  Systolic function was normal  Wall thickness was normal      LEFT ATRIUM: Size was normal      RIGHT ATRIUM: Size was at the upper limits of normal      MITRAL VALVE: There was mild annular calcification  DOPPLER: There was no significant regurgitation      AORTIC VALVE: The valve was probably trileaflet  DOPPLER: There was mild to moderate stenosis  There was no significant regurgitation      TRICUSPID VALVE: DOPPLER: There was mild regurgitation   Estimated peak PA pressure was 35 mmHg      PULMONIC VALVE: Not well visualized      PERICARDIUM: A small pericardial effusion was identified circumferential to the heart  There was no evidence of hemodynamic compromise      AORTA: The root exhibited normal size      MEASUREMENT TABLES     DOPPLER MEASUREMENTS  Aortic valve   (Reference normals)  Peak gradient   25 95 mmHg   (--)  Mean gradient   16 04 mmHg   (--)  Valve area, cont   1 4 cm squared   (--)     SYSTEM MEASUREMENT TABLES     2D  %FS: 34 75 %  Ao Diam: 2 66 cm  EDV(Teich): 83 84 ml  EF(Teich): 64 23 %  ESV(Teich): 29 99 ml  IVSd: 1 24 cm  LA Diam: 3 08 cm  LVIDd: 4 32 cm  LVIDs: 2 82 cm  LVOT Diam: 1 93 cm  LVPWd: 1 01 cm  SV(Teich): 53 85 ml     CW  AV Env  Ti: 328 72 ms  AV VTI: 62 55 cm  AV Vmax: 2 57 m/s  AV Vmax: 2 55 m/s  AV Vmean: 1 9 m/s  AV maxP 51 mmHg  AV maxP 95 mmHg  AV meanP 04 mmHg  TR Vmax: 2 96 m/s  TR maxP 09 mmHg     PW  SHERMAN Vmax, Pt: 1 38 cm2  SHERMAN Vmax, Pt: 1 37 cm2  E' Sept: 0 07 m/s  E/E' Sept: 13 34  LVOT Vmax: 1 21 m/s  LVOT maxP 84 mmHg  MV A Martin: 1 15 m/s  MV Dec Hitchcock: 6 71 m/s2  MV DecT: 133 78 ms  MV E Martin: 0 9 m/s  MV E/A Ratio: 0 78     [facility] Accredited Echocardiography Laboratory     Prepared and electronically signed by  Loni Payton MD  Signed 06-Dec-2018 10:36:00    · Aspirin 81 mg PO Qdaily  · High-intensity statin therapy was initiated with Atorvastatin 40 mg PO daily  · PT/OT

## 2018-12-10 NOTE — OCCUPATIONAL THERAPY NOTE
Occupational Therapy Progress Note     Patient Name: Thomas PABLO Date: 12/10/2018  Problem List  Patient Active Problem List   Diagnosis    Chronic respiratory failure with hypoxia (HCC)    Acquired hypothyroidism    Acute encephalopathy    Generalized weakness    Presbycusis    Slow transit constipation    Acute cystitis with hematuria    Metabolic encephalopathy    Visual disturbance    Macrocytic anemia    CKD (chronic kidney disease) stage 4, GFR 15-29 ml/min (HCC)    Right bundle branch block    Aortic valve stenosis    Pulmonary hypertension ([address] )    Pericardial effusion             12/10/18 1307   Restrictions/Precautions   Weight Bearing Precautions Per Order No   Other Precautions Aspiration;Multiple lines;Telemetry;O2;Fall Risk   Pain Assessment   Pain Assessment No/denies pain   ADL   Where Assessed Supine, bed   LB Dressing Assistance 2  Maximal Assistance   LB Dressing Deficit Thread RLE into underwear; Thread LLE into underwear;Pull up over hips   Toileting Assistance  2  Maximal Assistance   Toileting Deficit Increased time to complete;Clothing management up;Clothing management down;Perineal hygiene   Bed Mobility   Rolling R 4  Minimal assistance   Additional items Assist x 1;Bedrails   Rolling L 4  Minimal assistance   Additional items Assist x 1;Bedrails   Supine to Sit 4  Minimal assistance   Additional items Assist x 1; Increased time required;Verbal cues   Sit to Supine 4  Minimal assistance   Additional items Assist x 1; Increased time required;Verbal cues   Additional Comments pt on 2L O2 throughout session; pt with productive cough during session and appears moderate SOB after completing tasks   Transfers   Sit to Stand 4  Minimal assistance   Additional items Assist x 2;Bedrails; Increased time required;Verbal cues  (RW)   Stand to Sit 4  Minimal assistance   Additional items Assist x 2; Increased time required;Verbal cues  (RW)   Stand pivot 4  Minimal assistance Additional items Assist x 2; Increased time required;Verbal cues  (RW)   Additional Comments pt performed functional transfers this date with min (A) x2 with use of RW; pt appears weak and fatigued, howver completing tasks at good pace   Functional Mobility   Functional Mobility 4  Minimal assistance   Additional Comments x1-2; p performed with RW 25ft with fatigue while seated in chair at end of session   Additional items Rolling walker   Cognition   Overall Cognitive Status Impaired   Arousal/Participation Alert; Cooperative   Attention Within functional limits   Orientation Level Oriented to person;Oriented to place   Memory Decreased short term memory   Following Commands Follows one step commands without difficulty   Comments pt is [facility]   Activity Tolerance   Activity Tolerance Patient limited by fatigue   Assessment   Assessment pt presents supine in bed reporting incontinence of urine requiring new brief; pt performed rolling and bed mobility at min (A) x1-2 with cues; pt performed functional mobility this session, which she was unable to perform in previous session with min (A) x1-2 with decreased endurance of 25ft; pt demonstrates motivation to perform and seated in chair at end of session; pt will benefit from continued OT intervention during hospital admission to maximize (I) with mobility   Recommendation   OT Discharge Recommendation 24 hour supervision/assist     Pt will benefit from continued OT services in order to maximize (I) c ADL performance, FM c RW, and improve overall endurance/strength required to complete functional tasks in preparation for d/c  Pt left seated in chair at end of session; all needs within reach; all lines intact; scds connected and turned on

## 2018-12-10 NOTE — ASSESSMENT & PLAN NOTE
Results for Floridalma Kyle (MRN [id number]) as of 12/6/2018 10:23   Ref   Range 12/6/2018 05:02   TSH 3RD GENERATON Latest Ref Range: 0 358 - 3 740 uIU/mL 5 787 (H)     · Increase levothyroxine to 100 micrograms PO Qdaily in the early morning  · Recheck a TSH level in 4-6 weeks with her PCP

## 2018-12-11 VITALS
SYSTOLIC BLOOD PRESSURE: 128 MMHG | WEIGHT: 162.7 LBS | TEMPERATURE: 98.4 F | OXYGEN SATURATION: 93 % | BODY MASS INDEX: 28.83 KG/M2 | HEIGHT: 63 IN | DIASTOLIC BLOOD PRESSURE: 60 MMHG | RESPIRATION RATE: 18 BRPM | HEART RATE: 68 BPM

## 2018-12-11 PROBLEM — R53.1 GENERALIZED WEAKNESS: Status: RESOLVED | Noted: 2018-10-17 | Resolved: 2018-12-11

## 2018-12-11 PROBLEM — G93.41 METABOLIC ENCEPHALOPATHY: Status: RESOLVED | Noted: 2018-10-19 | Resolved: 2018-12-11

## 2018-12-11 PROBLEM — H53.9 VISUAL DISTURBANCE: Status: RESOLVED | Noted: 2018-12-05 | Resolved: 2018-12-11

## 2018-12-11 PROCEDURE — 92526 ORAL FUNCTION THERAPY: CPT

## 2018-12-11 PROCEDURE — 97116 GAIT TRAINING THERAPY: CPT

## 2018-12-11 PROCEDURE — 97530 THERAPEUTIC ACTIVITIES: CPT

## 2018-12-11 PROCEDURE — 99239 HOSP IP/OBS DSCHRG MGMT >30: CPT | Performed by: INTERNAL MEDICINE

## 2018-12-11 RX ORDER — CEFEPIME HYDROCHLORIDE 1 G/50ML
1000 INJECTION, SOLUTION INTRAVENOUS EVERY 24 HOURS
Refills: 0
Start: 2018-12-12 | End: 2018-12-14

## 2018-12-11 RX ORDER — LEVOTHYROXINE SODIUM 0.1 MG/1
100 TABLET ORAL
Refills: 0
Start: 2018-12-12 | End: 2019-03-20 | Stop reason: HOSPADM

## 2018-12-11 RX ADMIN — MULTIPLE VITAMINS W/ MINERALS TAB 1 TABLET: TAB at 10:17

## 2018-12-11 RX ADMIN — CALCIUM CARBONATE-VITAMIN D TAB 500 MG-200 UNIT 1 TABLET: 500-200 TAB at 10:16

## 2018-12-11 RX ADMIN — CEFEPIME HYDROCHLORIDE 1000 MG: 1 INJECTION, SOLUTION INTRAVENOUS at 11:37

## 2018-12-11 RX ADMIN — HEPARIN SODIUM 5000 UNITS: 5000 INJECTION, SOLUTION INTRAVENOUS; SUBCUTANEOUS at 05:27

## 2018-12-11 RX ADMIN — LEVOTHYROXINE SODIUM 100 MCG: 100 TABLET ORAL at 05:28

## 2018-12-11 RX ADMIN — FERROUS SULFATE TAB 325 MG (65 MG ELEMENTAL FE) 325 MG: 325 (65 FE) TAB at 10:17

## 2018-12-11 RX ADMIN — ASPIRIN 81 MG 81 MG: 81 TABLET ORAL at 10:15

## 2018-12-11 RX ADMIN — HEPARIN SODIUM 5000 UNITS: 5000 INJECTION, SOLUTION INTRAVENOUS; SUBCUTANEOUS at 14:45

## 2018-12-11 RX ADMIN — COLLAGENASE SANTYL: 250 OINTMENT TOPICAL at 10:19

## 2018-12-11 NOTE — SPEECH THERAPY NOTE
Speech Language/Pathology    Speech/Language Pathology Progress Note    Patient Name: Chu Jernigan  NHQWY'L Date: 12/11/2018     Problem List  Patient Active Problem List   Diagnosis    Chronic respiratory failure with hypoxia (HCC)    Acquired hypothyroidism    Acute encephalopathy    Generalized weakness    Presbycusis    Slow transit constipation    Acute cystitis with hematuria    Metabolic encephalopathy    Visual disturbance    Macrocytic anemia    CKD (chronic kidney disease) stage 4, GFR 15-29 ml/min (HCC)    Right bundle branch block    Aortic valve stenosis    Pulmonary hypertension ([facility] )    Pericardial effusion        Past Medical History  Past Medical History:   Diagnosis Date    Anemia     Arthritis     Cancer ([facility] )     breast    Disease of thyroid gland     Hyperlipidemia     IBS (irritable bowel syndrome)     Overactive bladder     Peripheral neuropathy     Polio     Poliomyelitis     Renal disorder     Rhabdomyolysis     UTI (urinary tract infection)     Ventral hernia         Past Surgical History  Past Surgical History:   Procedure Laterality Date    APPENDECTOMY      BREAST SURGERY      right mastectomy         Subjective:  Awake, alert, OOb in chair  Pleasant and cooperative  Objective:  Seen for swallowing therapy agreeable to sips of thin and trials of soft/moist chopped fruits  Upper dentures placed, not lowers d/t looseness  Pt with functional mastication/breakdown of soft/solids (small bites)  Tolerated 8 small bites  After initial cue provided took single swallows of thin by cup without overt, immediate s/s of aspiration, despite respiratory wheeze and strained/strangled/forced voicing  Pt did expectorate thin clear phlegm 3x/ during trials  Assessment:  Tolerated small amounts of thin and soft-chopped fruits today  States she eats mostly soft cooked veggies at [facility]      Plan/Recommendations:  Continue trial/partial  Meal upgrades (baseline diet is regular ) as well as thin using single cup controlled swallows  Gary Aguilar MA, CCC/SLP  [id number]  [email or web address]  Available via tiger text

## 2018-12-11 NOTE — PHYSICAL THERAPY NOTE
PT Treatment Note      12/11/18 1000   Restrictions/Precautions   Other Precautions Chair Alarm; Bed Alarm; Fall Risk;O2   Subjective   Subjective Would like to ambulate  Bed Mobility   Additional Comments see OT note for bed mobility   Transfers   Sit to Stand 4  Minimal assistance   Additional items Assist x 2; Increased time required;Verbal cues   Stand to Sit 4  Minimal assistance   Additional items Assist x 2;Armrests; Verbal cues   Stand pivot 4  Minimal assistance   Ambulation/Elevation   Gait pattern Forward Flexion; Short stride; Excessively slow   Gait Assistance 4  Minimal assist   Additional items Assist x 1   Assistive Device Rolling walker   Distance 40'   Balance   Static Sitting Fair +   Dynamic Sitting Fair +   Static Standing 19 Sudha Brothers -  (with RW)   Endurance Deficit   Endurance Deficit Yes   Activity Tolerance   Activity Tolerance Patient limited by fatigue   Assessment   Prognosis Good   Problem List Decreased strength;Decreased endurance; Impaired balance;Decreased mobility; Decreased safety awareness   Assessment Pt  curently performing tx/ambualtion at (min) x 1-2 level of function  Demonstrates improving strength, endurance and functional mobility  Progressing distance with good to[place]rance  Pt is in need of continued activity in PT to improve safety balance endurance strength mobility transfers and short distance ambulation  Plan   Treatment/Interventions Functional transfer training;LE strengthening/ROM; Therapeutic exercise; Endurance training;Bed mobility;Gait training   Progress Progressing toward goals   Recommendation   Recommendation Long-term skilled PT;Long-term skilled nursing home placement   Pt  OOB  In chair  with call bell within reach, scd's connected and turned on and alarm on at end of PT session  Discussed with Yemi Cavazos, PT today's treatment and patient's current level of function for care coordination

## 2018-12-11 NOTE — ASSESSMENT & PLAN NOTE
Results for Yue Haywood (MRN [id number]) as of 12/6/2018 10:23   Ref   Range 12/6/2018 05:02   TSH 3RD GENERATON Latest Ref Range: 0 358 - 3 740 uIU/mL 5 787 (H)     · Increase levothyroxine to 100 micrograms PO Qdaily in the early morning  · Recheck a TSH level in 4-6 weeks with her PCP

## 2018-12-11 NOTE — DISCHARGE SUMMARY
Discharge- Radha Jurado 1/3/1924, 80 y o  female MRN: [id number]    Unit/Bed#: 701-44 Encounter: [id number]    Primary Care Provider: Maty Solano DO   Date and time admitted to hospital: 12/5/2018 11:27 AM        * Acute encephalopathy   Assessment & Plan    · Acute metabolic encephalopathy, present on admission, has resolved  · Possibly secondary to acute cystitis  · Stroke workup negative      IMPRESSION:     1  No acute intracranial findings or significant change from prior  Specifically, no infarct      2   Mild chronic sinus mucosal disease      · An MRA of the head was completed on 12/06/2018 with the following impression/results:       Acute cystitis with hematuria   Assessment & Plan    Microbiology Results (last 21 days)     Procedure Component Value - Date/Time   Urine culture [[id number]] (Abnormal)  Collected: 12/05/18 1715   Lab Status: Final result Specimen: Urine from Urine, Clean Catch Updated: 12/08/18 1618    Urine Culture 40,000-49,000 cfu/ml Pseudomonas aeruginosa (A)     10,000-19,000 cfu/ml Proteus mirabilis (A)   Susceptibility      Pseudomonas aeruginosa     JACK    Aztreonam ($$$)  <4 ug/ml"><4 ug/ml Susceptible    Cefepime ($) <2 00 ug/ml"><2 00 ug/ml Susceptible    Ceftazidime ($$) 2 ug/ml Susceptible    Ciprofloxacin ($)  <1 00 ug/ml"><1 00 ug/ml Susceptible    Gentamicin ($$) 4 ug/ml Susceptible    Imipenem 1 ug/ml Susceptible    Levofloxacin ($) 0 50 ug/ml Susceptible    Meropenem ($$) <1 00 ug/ml"><1 00 ug/ml Susceptible    Piperacillin + Tazobactam ($$$) <4 ug/ml"><4 ug/ml Susceptible    Tobramycin ($) 2 ug/ml Susceptible    ZID Performed Yes               Susceptibility      Proteus mirabilis     JACK    Amikacin ($$) <16 ug/ml"><16 ug/ml Susceptible    Ampicillin ($$) <8 00 ug/ml"><8 00 ug/ml Susceptible    Aztreonam ($$$)  <4 ug/ml"><4 ug/ml Susceptible    Cefazolin ($) <2 00 ug/ml"><2 00 ug/ml Susceptible    Ciprofloxacin ($)  >2 00 ug/ml Resistant    Gentamicin ($$) 8 ug/ml Intermediate    Levofloxacin ($) 4 00 ug/ml Intermediate    Nitrofurantoin >64 ug/ml Resistant    Tetracycline >8 ug/ml Resistant    Tobramycin ($) 4 ug/ml Susceptible    Trimethoprim + Sulfamethoxazole ($$$) >2/38 ug/ml Resistant    ZID Performed Yes              · Patient appears to have clinically improved after changing antibiotics to IV cefepime, will complete a 5 day course  Acquired hypothyroidism   Assessment & Plan    Results for Jackie Suarez (MRN [id number]) as of 12/6/2018 10:23   Ref  Range 12/6/2018 05:02   TSH 3RD GENERATON Latest Ref Range: 0 358 - 3 740 uIU/mL 5 787 (H)     · Increase levothyroxine to 100 micrograms PO Qdaily in the early morning  · Recheck a TSH level in 4-6 weeks with her PCP     Aortic valve stenosis   Assessment & Plan    · Outpatient follow-up with Cardiology  CKD (chronic kidney disease) stage 4, GFR 15-29 ml/min ([facility])   Assessment & Plan    · Baseline creatinine of 1 1-1 3  · Her creatinine has improved to her baseline   · Renal function stable on oral intake     Pericardial effusion   Assessment & Plan    · No evidence of hemodynamic compromise on echocardiogram       Pulmonary hypertension ([facility] [address] )   Assessment & Plan    · Outpatient follow-up with Pulmonology     Macrocytic anemia   Assessment & Plan    · Follow the CBC  · Transfuse for hemoglobin less than 7  Results for Jackie Suarez (MRN [id number]) as of 12/7/2018 10:00   Ref   Range 12/6/2018 05:02   Folate Latest Ref Range: 3 1 - 17 5 ng/mL >20 0 (H)   Vitamin B-12 Latest Ref Range: 100 - 900 pg/mL 1,342 (H)        Chronic respiratory failure with hypoxia [facility])   Assessment & Plan    Patient is stable 1 5 L via nasal cannula     Visual disturbanceresolved as of 12/11/2018   Assessment & Plan    · Continue the stroke pathway  · An MRI of the brain was completed on 12/06/2018 with the following results/impression:  FINDINGS:     BRAIN PARENCHYMA:  Mild patchy periventricular white matter FLAIR/T2 hyperintensity suggesting chronic microangiopathic change  Appearance is similar to October  No acute infarct  No parenchymal hemorrhage  No mass  Chronic calcifications again   demonstrated within the basal ganglia      VENTRICLES AND EXTRA-AXIAL SPACES:  Mild involutional volume loss  No hydrocephalus, herniation, or mass effect  No extra-axial or intraventricular hemorrhage      SELLA AND PITUITARY GLAND:  Normal      ORBITS:  Prior bilateral lens surgeries  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  No acute orbital findings      PARANASAL SINUSES:  Mild chronic mucosal disease in the maxillary, frontal, and sphenoid sinuses as well as and the anterior ethmoid air cells  No fluid levels  No mastoid effusions      VASCULATURE:  Evaluation of the major intracranial vasculature demonstrates appropriate flow voids  MRA from the same day is reported separately      CALVARIUM AND SKULL BASE:  Normal      EXTRACRANIAL SOFT TISSUES:  Normal      IMPRESSION:     1  No acute intracranial findings or significant change from prior  Specifically, no infarct      2   Mild chronic sinus mucosal disease  · An MRA of the head was completed on 12/06/2018 with the following impression/results:  FINDINGS:     IMAGE QUALITY:  Diagnostic      ANATOMY     INTERNAL CAROTID ARTERIES:  Luminal irregularity within the cavernous segment of the internal carotids suggests atheromatous disease, also seen on CT  No flow-limiting stenoses demonstrated  Normal ICA termini      ANTERIOR CIRCULATION:  Normal A1 segments  Normal anterior communicating artery  Normal flow-related enhancement of the anterior cerebral arteries      MIDDLE CEREBRAL ARTERY CIRCULATION:  The M1 segment and middle cerebral artery branches demonstrate normal flow-related enhancement      DISTAL VERTEBRAL ARTERIES:  Congenital left dominance--and normal variant  No flow-limiting stenoses    The posterior inferior cerebellar artery origins are normal       BASILAR ARTERY:  Normal      POSTERIOR CEREBRAL ARTERIES:  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  Both arteries demonstrate normal flow-related enhancement  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      IMPRESSION:     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  No occlusive vasculopathy or aneurysms demonstrated in the intracranial circulation  · A bilateral carotid duplex was completed on 12/05/2018 with the following results/impression:  CONCLUSION:     Impression  RIGHT:  There is <50% stenosis noted in the internal carotid artery  Plaque is  heterogenous and irregular  Vertebral artery flow is antegrade  There is no significant subclavian artery  disease  LEFT:  There is <50% stenosis noted in the internal carotid artery  Plaque is  heterogenous and irregular  Vertebral artery flow is antegrade  There is no significant subclavian artery  disease  There are no priors for comparison  Challenging exam due to pronounced snoring and inability to stay awake for  exam      Internal carotid artery stenosis determination by consensus criteria from:  Ana Luisa Aden et al  Carotid Artery Stenosis: Gray-Scale and Doppler US Diagnosis  - Society of Radiologists in [address] [place] [address], Radiology 2003;  [phone]  SIGNATURE:  Electronically Signed by: Kenneth Valdez on 2018-12-05 11:51:32 PM    · An echocardiogram was completed on 12/06/2018 with the following results:  SUMMARY     PROCEDURE INFORMATION:  This was a technically difficult study      LEFT VENTRICLE:  Size was normal   Systolic function was normal  Ejection fraction was estimated to be 60 %  There were no regional wall motion abnormalities    Wall thickness was at the upper limits of normal      RIGHT VENTRICLE:  The size was at the upper limits of normal      MITRAL VALVE:  There was mild annular calcification      AORTIC VALVE:  The valve was probably trileaflet  There was mild to moderate stenosis  Valve peak gradient was 25 95 mmHg  Valve mean gradient was 16 04 mmHg  Aortic valve area was 1 4 cm squared by the continuity equation      TRICUSPID VALVE:  There was mild regurgitation  Estimated peak PA pressure was 35 mmHg      PERICARDIUM:  A small pericardial effusion was identified circumferential to the heart  There was no evidence of hemodynamic compromise      HISTORY: PRIOR HISTORY: echo 10/2018, aortic stenosis     PROCEDURE: The procedure was performed at the bedside  This was a routine study  The transthoracic approach was used  The study included complete 2D imaging, M-mode, complete spectral Doppler, and color Doppler  The heart rate was 80 bpm,  at the start of the study  This was a technically difficult study      LEFT VENTRICLE: Size was normal  Systolic function was normal  Ejection fraction was estimated to be 60 %  There were no regional wall motion abnormalities  Wall thickness was at the upper limits of normal      RIGHT VENTRICLE: The size was at the upper limits of normal  Systolic function was normal  Wall thickness was normal      LEFT ATRIUM: Size was normal      RIGHT ATRIUM: Size was at the upper limits of normal      MITRAL VALVE: There was mild annular calcification  DOPPLER: There was no significant regurgitation      AORTIC VALVE: The valve was probably trileaflet  DOPPLER: There was mild to moderate stenosis  There was no significant regurgitation      TRICUSPID VALVE: DOPPLER: There was mild regurgitation  Estimated peak PA pressure was 35 mmHg      PULMONIC VALVE: Not well visualized      PERICARDIUM: A small pericardial effusion was identified circumferential to the heart   There was no evidence of hemodynamic compromise      AORTA: The root exhibited normal size      MEASUREMENT TABLES     DOPPLER MEASUREMENTS  Aortic valve   (Reference normals)  Peak gradient   25 95 mmHg   (--)  Mean gradient   16 04 mmHg   (--)  Valve area, cont   1 4 cm squared   (--)     SYSTEM MEASUREMENT TABLES     2D  %FS: 34 75 %  Ao Diam: 2 66 cm  EDV(Teich): 83 84 ml  EF(Teich): 64 23 %  ESV(Teich): 29 99 ml  IVSd: 1 24 cm  LA Diam: 3 08 cm  LVIDd: 4 32 cm  LVIDs: 2 82 cm  LVOT Diam: 1 93 cm  LVPWd: 1 01 cm  SV(Teich): 53 85 ml     CW  AV Env  Ti: 328 72 ms  AV VTI: 62 55 cm  AV Vmax: 2 57 m/s  AV Vmax: 2 55 m/s  AV Vmean: 1 9 m/s  AV maxP 51 mmHg  AV maxP 95 mmHg  AV meanP 04 mmHg  TR Vmax: 2 96 m/s  TR maxP 09 mmHg     PW  SHERMAN Vmax, Pt: 1 38 cm2  SHERMAN Vmax, Pt: 1 37 cm2  E' Sept: 0 07 m/s  E/E' Sept: 13 34  LVOT Vmax: 1 21 m/s  LVOT maxP 84 mmHg  MV A Martin: 1 15 m/s  MV Dec Wood: 6 71 m/s2  MV DecT: 133 78 ms  MV E Martin: 0 9 m/s  MV E/A Ratio: 0 78     [facility] Accredited [facility] Laboratory     Prepared and electronically signed by  Anastasiya Abbott MD  Signed 06-Dec-2018 10:36:00    · Aspirin 81 mg PO Qdaily  · High-intensity statin therapy was initiated with Atorvastatin 40 mg PO daily  · Continue telemetry monitoring to watch for any signs of atrial fibrillation/atrial flutter, which could cause an embolic CVA  · PT/OT       Discharging Physician / Practitioner: Christiano Ortiz DO  PCP: Kaye Henley DO  Admission Date:   Admission Orders     Ordered        18 1358  Inpatient Admission  Once             Discharge Date: 18    Resolved Problems  Date Reviewed: 2018          Resolved    Metabolic encephalopathy      Resolved by  Christiano Ortiz DO    Facial droop 12/10/2018     Resolved by  Christiano Ortiz,     Visual disturbance 2018     Resolved by  Christiano Ortiz,     Elevated TSH 12/10/2018     Resolved by  Christiano Ortiz,     Hypernatremia 12/10/2018     Resolved by  Christiano Ortiz DO    Dysphagia 12/10/2018     Resolved by  Christiano Ortiz DO    Hypophosphatemia 12/10/2018     Resolved by  Christiano Ortiz DO    Hypocalcemia 12/10/2018     Resolved by  Ezequiel Rosales DO          Reason for Admission:     Chief Complaint:  Confusion, facial droop, and double vision     History of Present Illness: Nanci Quintanilla is a 80 y o  female who currently resides at [facility] and presented to the ED with confusion, a right-sided facial droop, double vision, and expressive aphasia  The patient's symptomatology commenced today, 12/05/2018, but the exact time of onset is unclear  Her symptoms were constant in nature, and nothing seemed to improve her symptoms  In addition, the patient complains of an intermittent headache  No chest pain  No shortness of breath  No abdominal pain  No nausea or vomiting  Hospital Course: Nanci Quintanilla is a 80 y o  female patient who originally presented to the hospital on 12/5/2018 due to HPI as noted above, patient had improvement after the initiation of IV cefepime, suspect underlying urinary tract infection  Mental status has returned to normal     Please see above list of diagnoses and related plan for additional information  Condition at Discharge: good     Discharge Day Visit / Exam:   Vitals:    12/11/18 0736   BP: 128/60   Pulse: 68   Resp: 18   Temp: 98 4 °F (36 9 °C)   SpO2: 93%     NAD    Discharge instructions/Information to patient and family:   See after visit summary for information provided to patient and family  Provisions for Follow-Up Care:  See after visit summary for information related to follow-up care and any pertinent home health orders  Disposition:     [facility] at [address] [place] [address] to [facility] SNF:   · Not Applicable to this Patient - Not Applicable to this Patient    Planned Readmission: no     Discharge Statement:  I spent 35 minutes discharging the patient  This time was spent on the day of discharge  I had direct contact with the patient on the day of discharge   Greater than 50% of the total time was spent examining patient, answering all patient questions, arranging and discussing plan of care with patient as well as directly providing post-discharge instructions  Additional time then spent on discharge activities  Discharge Medications:  See after visit summary for reconciled discharge medications provided to patient and family

## 2018-12-11 NOTE — PLAN OF CARE
Problem: PHYSICAL THERAPY ADULT  Goal: Performs mobility at highest level of function for planned discharge setting  See evaluation for individualized goals  Outcome: Progressing  Prognosis: Good  Problem List: Decreased strength, Decreased endurance, Impaired balance, Decreased mobility, Decreased safety awareness  Assessment: Pt  curently performing tx/ambualtion at (min) x 1-2 level of function  Demonstrates improving strength, endurance and functional mobility  Progressing distance with good toelrance  Pt is in need of continued activity in PT to improve safety balance endurance strength mobility transfers and short distance ambulation  Recommendation: Long-term skilled PT, Long-term skilled nursing home placement     PT - OK to Discharge: No (when medically stable for discharge)    See flowsheet documentation for full assessment

## 2018-12-11 NOTE — ASSESSMENT & PLAN NOTE
· Baseline creatinine of 1 1-1 3  · Her creatinine has improved to her baseline   · Renal function stable on oral intake

## 2018-12-11 NOTE — ASSESSMENT & PLAN NOTE
· Follow the CBC  · Transfuse for hemoglobin less than 7  Results for Fabricio Calix (MRN [id number]) as of 12/7/2018 10:00   Ref   Range 12/6/2018 05:02   Folate Latest Ref Range: 3 1 - 17 5 ng/mL >20 0 (H)   Vitamin B-12 Latest Ref Range: 100 - 900 pg/mL 1,342 (H)

## 2018-12-11 NOTE — PLAN OF CARE
Problem: OCCUPATIONAL THERAPY ADULT  Goal: Performs self-care activities at highest level of function for planned discharge setting  See evaluation for individualized goals  Treatment Interventions: ADL retraining, Functional transfer training, UE strengthening/ROM, Endurance training, Cognitive reorientation, Patient/family training, Activityengagement          See flowsheet documentation for full assessment, interventions and recommendations  Outcome: Progressing  Limitation: Decreased ADL status, Decreased UE strength, Decreased Safe judgement during ADL, Decreased cognition, Decreased endurance, Decreased self-care trans, Decreased high-level ADLs, Decreased UE ROM     Assessment: Pt presenting with multiple deficits affecting overall functional performance on initial assessment  Requires A x 2 to safely complete sit to stand, func mobility and txfr recliner at bedside  Recommend continue active OT services in attempt to facilitate return to highest LOF       OT Discharge Recommendation: 24 hour supervision/assist

## 2018-12-11 NOTE — OCCUPATIONAL THERAPY NOTE
OT Note     12/11/18 0951   Restrictions/Precautions   Other Precautions Fall Risk;O2;Chair Alarm; Bed Alarm   Bed Mobility   Rolling L 4  Minimal assistance   Additional items Assist x 1;Bedrails;HOB elevated; Increased time required;Verbal cues   Supine to Sit 4  Minimal assistance   Additional items Assist x 1; Increased time required;Verbal cues   Additional items Impulsive  (Verbal cues to focus on task at hand)   Additional Comments Pt requires cues for safety and to come completely to EOB to facilitate sit to stand and txfr recliner at bedside  Refer PT note for additional info  Transfers   Sit to Stand (Refer PT note)   Functional Mobility   Additional Comments REquires A x 2 for safety   Activity Tolerance   Activity Tolerance Patient tolerated treatment well   Assessment   Assessment Pt presenting with multiple deficits affecting overall functional performance on initial assessment  Requires A x 2 to safely complete sit to stand, func mobility and txfr recliner at bedside  Recommend continue active OT services in attempt to facilitate return to highest LOF  Pt OOB in recliner following tx session  All needs in reach

## 2018-12-11 NOTE — UTILIZATION REVIEW
Continued Stay Review    Date: 12/10/2018  97 1      HR  59-67     RR  18        Tele = SR        bp 107/52  -  124/62    Physician note:  Acute encephalopathy seems to have improved after changing IVAB to cefepime on 12/9  Monitor closely to ensure mental status continues to improve on cefepime (needs 5 day course)  ?   If at baseline - continue treatment plan:   Remains on  2 5 L oxygen (sat 95%)   -  Continue to wean    12/11/2018  On 2L oxygen w/sat 93-95%    Vital Signs: /60 (BP Location: Left arm)   Pulse 68   Temp 98 4 °F (36 9 °C) (Temporal)   Resp 18   Ht 5' 2 99" (1 6 m)   Wt 73 8 kg (162 lb 11 2 oz)   SpO2 93%   BMI 28 82 kg/m²     Current Facility-Administered Medications:  acetaminophen 650 mg Oral Q6H PRN Deb Montoya, DO    aspirin 81 mg Oral Daily Deb Montoya, DO    atorvastatin 40 mg Oral QPM Deb Montoya, DO    calcium carbonate-vitamin D 1 tablet Oral BID With Meals Deb Montoya DO    cefepime 1,000 mg Intravenous Q24H Deb Montoya DO Last Rate: 1,000 mg (12/11/18 1137)   collagenase  Topical BID Deb Montoya, DO    ferrous sulfate 325 mg Oral BID With Meals Deb Montoya DO    heparin (porcine) 5,000 Units Subcutaneous [facility] & intermediate Endy Tapia DO    levothyroxine 100 mcg Oral Early Morning Deb Montoya DO    multivitamin-minerals 1 tablet Oral Daily Endy Tapia DO    ondansetron 4 mg Intravenous Q4H PRN Deb Montoya DO      Discharge Plan:   tbd

## 2018-12-11 NOTE — ASSESSMENT & PLAN NOTE
Microbiology Results (last 21 days)     Procedure Component Value - Date/Time   Urine culture [[id number]] (Abnormal)  Collected: 12/05/18 1715   Lab Status: Final result Specimen: Urine from Urine, Clean Catch Updated: 12/08/18 1618    Urine Culture 40,000-49,000 cfu/ml Pseudomonas aeruginosa (A)     10,000-19,000 cfu/ml Proteus mirabilis (A)   Susceptibility      Pseudomonas aeruginosa     JACK    Aztreonam ($$$)  <4 ug/ml"><4 ug/ml Susceptible    Cefepime ($) <2 00 ug/ml"><2 00 ug/ml Susceptible    Ceftazidime ($$) 2 ug/ml Susceptible    Ciprofloxacin ($)  <1 00 ug/ml"><1 00 ug/ml Susceptible    Gentamicin ($$) 4 ug/ml Susceptible    Imipenem 1 ug/ml Susceptible    Levofloxacin ($) 0 50 ug/ml Susceptible    Meropenem ($$) <1 00 ug/ml"><1 00 ug/ml Susceptible    Piperacillin + Tazobactam ($$$) <4 ug/ml"><4 ug/ml Susceptible    Tobramycin ($) 2 ug/ml Susceptible    ZID Performed Yes               Susceptibility      Proteus mirabilis     JACK    Amikacin ($$) <16 ug/ml"><16 ug/ml Susceptible    Ampicillin ($$) <8 00 ug/ml"><8 00 ug/ml Susceptible    Aztreonam ($$$)  <4 ug/ml"><4 ug/ml Susceptible    Cefazolin ($) <2 00 ug/ml"><2 00 ug/ml Susceptible    Ciprofloxacin ($)  >2 00 ug/ml Resistant    Gentamicin ($$) 8 ug/ml Intermediate    Levofloxacin ($) 4 00 ug/ml Intermediate    Nitrofurantoin >64 ug/ml Resistant    Tetracycline >8 ug/ml Resistant    Tobramycin ($) 4 ug/ml Susceptible    Trimethoprim + Sulfamethoxazole ($$$) >2/38 ug/ml Resistant    ZID Performed Yes              · Patient appears to have clinically improved after changing antibiotics to IV cefepime, will complete a 5 day course

## 2018-12-11 NOTE — NURSING NOTE
Discharge instructions sent with patient on discharge to [facility]  Report called to receiving nurse  Offers no complaints at this time

## 2018-12-11 NOTE — ASSESSMENT & PLAN NOTE
· Continue the stroke pathway  · An MRI of the brain was completed on 12/06/2018 with the following results/impression:  FINDINGS:     BRAIN PARENCHYMA:  Mild patchy periventricular white matter FLAIR/T2 hyperintensity suggesting chronic microangiopathic change  Appearance is similar to October  No acute infarct  No parenchymal hemorrhage  No mass  Chronic calcifications again   demonstrated within the basal ganglia      VENTRICLES AND EXTRA-AXIAL SPACES:  Mild involutional volume loss  No hydrocephalus, herniation, or mass effect  No extra-axial or intraventricular hemorrhage      SELLA AND PITUITARY GLAND:  Normal      ORBITS:  Prior bilateral lens surgeries  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  No acute orbital findings      PARANASAL SINUSES:  Mild chronic mucosal disease in the maxillary, frontal, and sphenoid sinuses as well as and the anterior ethmoid air cells  No fluid levels  No mastoid effusions      VASCULATURE:  Evaluation of the major intracranial vasculature demonstrates appropriate flow voids  MRA from the same day is reported separately      CALVARIUM AND SKULL BASE:  Normal      EXTRACRANIAL SOFT TISSUES:  Normal      IMPRESSION:     1  No acute intracranial findings or significant change from prior  Specifically, no infarct      2   Mild chronic sinus mucosal disease  · An MRA of the head was completed on 12/06/2018 with the following impression/results:  FINDINGS:     IMAGE QUALITY:  Diagnostic      ANATOMY     INTERNAL CAROTID ARTERIES:  Luminal irregularity within the cavernous segment of the internal carotids suggests atheromatous disease, also seen on CT  No flow-limiting stenoses demonstrated  Normal ICA termini      ANTERIOR CIRCULATION:  Normal A1 segments  Normal anterior communicating artery    Normal flow-related enhancement of the anterior cerebral arteries      MIDDLE CEREBRAL ARTERY CIRCULATION:  The M1 segment and middle cerebral artery branches demonstrate normal flow-related enhancement      DISTAL VERTEBRAL ARTERIES:  Congenital left dominance--and normal variant  No flow-limiting stenoses  The posterior inferior cerebellar artery origins are normal       BASILAR ARTERY:  Normal      POSTERIOR CEREBRAL ARTERIES:  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  Both arteries demonstrate normal flow-related enhancement  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      IMPRESSION:     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  No occlusive vasculopathy or aneurysms demonstrated in the intracranial circulation  · A bilateral carotid duplex was completed on 12/05/2018 with the following results/impression:  CONCLUSION:     Impression  RIGHT:  There is <50% stenosis noted in the internal carotid artery  Plaque is  heterogenous and irregular  Vertebral artery flow is antegrade  There is no significant subclavian artery  disease  LEFT:  There is <50% stenosis noted in the internal carotid artery  Plaque is  heterogenous and irregular  Vertebral artery flow is antegrade  There is no significant subclavian artery  disease  There are no priors for comparison  Challenging exam due to pronounced snoring and inability to stay awake for  exam      Internal carotid artery stenosis determination by consensus criteria from:  Wolf Hale , et al  Carotid Artery Stenosis: Gray-Scale and Doppler US Diagnosis  - [employer] in [address] [place] [address], Radiology 2003;  [phone]  SIGNATURE:  Electronically Signed by: Clarke Richard on 2018-12-05 11:51:32 PM    · An echocardiogram was completed on 12/06/2018 with the following results:  SUMMARY     PROCEDURE INFORMATION:  This was a technically difficult study      LEFT VENTRICLE:  Size was normal   Systolic function was normal  Ejection fraction was estimated to be 60 %    There were no regional wall motion abnormalities  Wall thickness was at the upper limits of normal      RIGHT VENTRICLE:  The size was at the upper limits of normal      MITRAL VALVE:  There was mild annular calcification      AORTIC VALVE:  The valve was probably trileaflet  There was mild to moderate stenosis  Valve peak gradient was 25 95 mmHg  Valve mean gradient was 16 04 mmHg  Aortic valve area was 1 4 cm squared by the continuity equation      TRICUSPID VALVE:  There was mild regurgitation  Estimated peak PA pressure was 35 mmHg      PERICARDIUM:  A small pericardial effusion was identified circumferential to the heart  There was no evidence of hemodynamic compromise      HISTORY: PRIOR HISTORY: echo 10/2018, aortic stenosis     PROCEDURE: The procedure was performed at the bedside  This was a routine study  The transthoracic approach was used  The study included complete 2D imaging, M-mode, complete spectral Doppler, and color Doppler  The heart rate was 80 bpm,  at the start of the study  This was a technically difficult study      LEFT VENTRICLE: Size was normal  Systolic function was normal  Ejection fraction was estimated to be 60 %  There were no regional wall motion abnormalities  Wall thickness was at the upper limits of normal      RIGHT VENTRICLE: The size was at the upper limits of normal  Systolic function was normal  Wall thickness was normal      LEFT ATRIUM: Size was normal      RIGHT ATRIUM: Size was at the upper limits of normal      MITRAL VALVE: There was mild annular calcification  DOPPLER: There was no significant regurgitation      AORTIC VALVE: The valve was probably trileaflet  DOPPLER: There was mild to moderate stenosis  There was no significant regurgitation      TRICUSPID VALVE: DOPPLER: There was mild regurgitation  Estimated peak PA pressure was 35 mmHg      PULMONIC VALVE: Not well visualized      PERICARDIUM: A small pericardial effusion was identified circumferential to the heart   There was no evidence of hemodynamic compromise      AORTA: The root exhibited normal size      MEASUREMENT TABLES     DOPPLER MEASUREMENTS  Aortic valve   (Reference normals)  Peak gradient   25 95 mmHg   (--)  Mean gradient   16 04 mmHg   (--)  Valve area, cont   1 4 cm squared   (--)     SYSTEM MEASUREMENT TABLES     2D  %FS: 34 75 %  Ao Diam: 2 66 cm  EDV(Teich): 83 84 ml  EF(Teich): 64 23 %  ESV(Teich): 29 99 ml  IVSd: 1 24 cm  LA Diam: 3 08 cm  LVIDd: 4 32 cm  LVIDs: 2 82 cm  LVOT Diam: 1 93 cm  LVPWd: 1 01 cm  SV(Teich): 53 85 ml     CW  AV Env  Ti: 328 72 ms  AV VTI: 62 55 cm  AV Vmax: 2 57 m/s  AV Vmax: 2 55 m/s  AV Vmean: 1 9 m/s  AV maxP 51 mmHg  AV maxP 95 mmHg  AV meanP 04 mmHg  TR Vmax: 2 96 m/s  TR maxP 09 mmHg     PW  SHERMAN Vmax, Pt: 1 38 cm2  SHERMAN Vmax, Pt: 1 37 cm2  E' Sept: 0 07 m/s  E/E' Sept: 13 34  LVOT Vmax: 1 21 m/s  LVOT maxP 84 mmHg  MV A Martin: 1 15 m/s  MV Dec [place]: 6 71 m/s2  MV DecT: 133 78 ms  MV E Martin: 0 9 m/s  MV E/A Ratio: 0 78     [employer] Accredited Echocardiography Laboratory     Prepared and electronically signed by  Ochoa Decker MD  Signed 06-Dec-2018 10:36:00    · Aspirin 81 mg PO Qdaily  · High-intensity statin therapy was initiated with Atorvastatin 40 mg PO daily  · Continue telemetry monitoring to watch for any signs of atrial fibrillation/atrial flutter, which could cause an embolic CVA  · PT/OT

## 2018-12-11 NOTE — ASSESSMENT & PLAN NOTE
· Acute metabolic encephalopathy, present on admission, has resolved  · Possibly secondary to acute cystitis  · Stroke workup negative      IMPRESSION:     1  No acute intracranial findings or significant change from prior  Specifically, no infarct      2   Mild chronic sinus mucosal disease      · An MRA of the head was completed on 12/06/2018 with the following impression/results:

## 2018-12-12 NOTE — SOCIAL WORK
Pt is being discharged today  Pt will need IV antibiotics for 2 more days  I notified Laura Charles at [facility] of same  Pt will go back to [place] with a new NSS lock  [facility]  Ambulance at 5pm   Case Management reviewed discharge planning process including the following: identifying help that is needed at home, pt's preference for discharge needs and Meds at [facility]  Reviewed with Pt that any member of the healthcare team can answer questions regarding : medications, jmportance of recognizing  Signs and symptoms of any  medical problems  Case Management also encouraged pt to follow up with all recommended appointments after discharge

## 2018-12-14 ENCOUNTER — APPOINTMENT (EMERGENCY)
Dept: ULTRASOUND IMAGING | Facility: HOSPITAL | Age: 83
End: 2018-12-14
Payer: COMMERCIAL

## 2018-12-14 ENCOUNTER — HOSPITAL ENCOUNTER (EMERGENCY)
Facility: HOSPITAL | Age: 83
Discharge: NURSING, REHAB OR LONG-TERM CARE | End: 2018-12-14
Attending: EMERGENCY MEDICINE | Admitting: EMERGENCY MEDICINE
Payer: COMMERCIAL

## 2018-12-14 ENCOUNTER — APPOINTMENT (EMERGENCY)
Dept: CT IMAGING | Facility: HOSPITAL | Age: 83
End: 2018-12-14
Payer: COMMERCIAL

## 2018-12-14 ENCOUNTER — APPOINTMENT (EMERGENCY)
Dept: RADIOLOGY | Facility: HOSPITAL | Age: 83
End: 2018-12-14
Payer: COMMERCIAL

## 2018-12-14 VITALS
HEIGHT: 63 IN | WEIGHT: 169.09 LBS | SYSTOLIC BLOOD PRESSURE: 110 MMHG | HEART RATE: 67 BPM | DIASTOLIC BLOOD PRESSURE: 53 MMHG | TEMPERATURE: 97.5 F | OXYGEN SATURATION: 98 % | BODY MASS INDEX: 29.96 KG/M2 | RESPIRATION RATE: 13 BRPM

## 2018-12-14 DIAGNOSIS — N39.0 UTI (URINARY TRACT INFECTION): ICD-10-CM

## 2018-12-14 DIAGNOSIS — R41.82 ALTERED MENTAL STATUS: Primary | ICD-10-CM

## 2018-12-14 LAB
ALBUMIN SERPL BCP-MCNC: 2.7 G/DL (ref 3.5–5)
ALP SERPL-CCNC: 74 U/L (ref 46–116)
ALT SERPL W P-5'-P-CCNC: 23 U/L (ref 12–78)
ANION GAP SERPL CALCULATED.3IONS-SCNC: 4 MMOL/L (ref 4–13)
APTT PPP: 28 SECONDS (ref 26–38)
AST SERPL W P-5'-P-CCNC: 22 U/L (ref 5–45)
BACTERIA UR QL AUTO: ABNORMAL /HPF
BASOPHILS # BLD AUTO: 0.02 THOUSANDS/ΜL (ref 0–0.1)
BASOPHILS NFR BLD AUTO: 0 % (ref 0–1)
BILIRUB SERPL-MCNC: 0.3 MG/DL (ref 0.2–1)
BILIRUB UR QL STRIP: NEGATIVE
BUN SERPL-MCNC: 20 MG/DL (ref 5–25)
CALCIUM SERPL-MCNC: 8.6 MG/DL (ref 8.3–10.1)
CHLORIDE SERPL-SCNC: 108 MMOL/L (ref 100–108)
CLARITY UR: ABNORMAL
CO2 SERPL-SCNC: 32 MMOL/L (ref 21–32)
COLOR UR: YELLOW
CREAT SERPL-MCNC: 1.04 MG/DL (ref 0.6–1.3)
EOSINOPHIL # BLD AUTO: 0.48 THOUSAND/ΜL (ref 0–0.61)
EOSINOPHIL NFR BLD AUTO: 5 % (ref 0–6)
ERYTHROCYTE [DISTWIDTH] IN BLOOD BY AUTOMATED COUNT: 15.2 % (ref 11.6–15.1)
GFR SERPL CREATININE-BSD FRML MDRD: 46 ML/MIN/1.73SQ M
GLUCOSE SERPL-MCNC: 82 MG/DL (ref 65–140)
GLUCOSE UR STRIP-MCNC: NEGATIVE MG/DL
HCT VFR BLD AUTO: 32.9 % (ref 34.8–46.1)
HGB BLD-MCNC: 10.3 G/DL (ref 11.5–15.4)
HGB UR QL STRIP.AUTO: ABNORMAL
IMM GRANULOCYTES # BLD AUTO: 0.03 THOUSAND/UL (ref 0–0.2)
IMM GRANULOCYTES NFR BLD AUTO: 0 % (ref 0–2)
INR PPP: 0.98 (ref 0.86–1.17)
KETONES UR STRIP-MCNC: NEGATIVE MG/DL
LACTATE SERPL-SCNC: 0.7 MMOL/L (ref 0.5–2)
LEUKOCYTE ESTERASE UR QL STRIP: ABNORMAL
LYMPHOCYTES # BLD AUTO: 1.08 THOUSANDS/ΜL (ref 0.6–4.47)
LYMPHOCYTES NFR BLD AUTO: 12 % (ref 14–44)
MAGNESIUM SERPL-MCNC: 2 MG/DL (ref 1.6–2.6)
MCH RBC QN AUTO: 31.4 PG (ref 26.8–34.3)
MCHC RBC AUTO-ENTMCNC: 31.3 G/DL (ref 31.4–37.4)
MCV RBC AUTO: 100 FL (ref 82–98)
MONOCYTES # BLD AUTO: 0.45 THOUSAND/ΜL (ref 0.17–1.22)
MONOCYTES NFR BLD AUTO: 5 % (ref 4–12)
MUCOUS THREADS UR QL AUTO: ABNORMAL
NEUTROPHILS # BLD AUTO: 6.75 THOUSANDS/ΜL (ref 1.85–7.62)
NEUTS SEG NFR BLD AUTO: 78 % (ref 43–75)
NITRITE UR QL STRIP: POSITIVE
NON-SQ EPI CELLS URNS QL MICRO: ABNORMAL /HPF
NRBC BLD AUTO-RTO: 0 /100 WBCS
NT-PROBNP SERPL-MCNC: 239 PG/ML
PH UR STRIP.AUTO: 6 [PH] (ref 4.5–8)
PLATELET # BLD AUTO: 241 THOUSANDS/UL (ref 149–390)
PMV BLD AUTO: 9.2 FL (ref 8.9–12.7)
POTASSIUM SERPL-SCNC: 4.5 MMOL/L (ref 3.5–5.3)
PROT SERPL-MCNC: 6.1 G/DL (ref 6.4–8.2)
PROT UR STRIP-MCNC: ABNORMAL MG/DL
PROTHROMBIN TIME: 12.5 SECONDS (ref 11.8–14.2)
RBC # BLD AUTO: 3.28 MILLION/UL (ref 3.81–5.12)
RBC #/AREA URNS AUTO: ABNORMAL /HPF
SODIUM SERPL-SCNC: 144 MMOL/L (ref 136–145)
SP GR UR STRIP.AUTO: 1.01 (ref 1–1.03)
TROPONIN I SERPL-MCNC: 0.02 NG/ML
TSH SERPL DL<=0.05 MIU/L-ACNC: 8.27 UIU/ML (ref 0.36–3.74)
UROBILINOGEN UR QL STRIP.AUTO: 0.2 E.U./DL
WBC # BLD AUTO: 8.81 THOUSAND/UL (ref 4.31–10.16)
WBC #/AREA URNS AUTO: ABNORMAL /HPF

## 2018-12-14 PROCEDURE — 83735 ASSAY OF MAGNESIUM: CPT | Performed by: PHYSICIAN ASSISTANT

## 2018-12-14 PROCEDURE — 71045 X-RAY EXAM CHEST 1 VIEW: CPT

## 2018-12-14 PROCEDURE — 99285 EMERGENCY DEPT VISIT HI MDM: CPT

## 2018-12-14 PROCEDURE — 81001 URINALYSIS AUTO W/SCOPE: CPT | Performed by: PHYSICIAN ASSISTANT

## 2018-12-14 PROCEDURE — 93005 ELECTROCARDIOGRAM TRACING: CPT

## 2018-12-14 PROCEDURE — 87040 BLOOD CULTURE FOR BACTERIA: CPT | Performed by: PHYSICIAN ASSISTANT

## 2018-12-14 PROCEDURE — 85730 THROMBOPLASTIN TIME PARTIAL: CPT | Performed by: PHYSICIAN ASSISTANT

## 2018-12-14 PROCEDURE — 70450 CT HEAD/BRAIN W/O DYE: CPT

## 2018-12-14 PROCEDURE — 80053 COMPREHEN METABOLIC PANEL: CPT | Performed by: PHYSICIAN ASSISTANT

## 2018-12-14 PROCEDURE — 83605 ASSAY OF LACTIC ACID: CPT | Performed by: PHYSICIAN ASSISTANT

## 2018-12-14 PROCEDURE — 93970 EXTREMITY STUDY: CPT | Performed by: SURGERY

## 2018-12-14 PROCEDURE — 36415 COLL VENOUS BLD VENIPUNCTURE: CPT | Performed by: PHYSICIAN ASSISTANT

## 2018-12-14 PROCEDURE — 85025 COMPLETE CBC W/AUTO DIFF WBC: CPT | Performed by: PHYSICIAN ASSISTANT

## 2018-12-14 PROCEDURE — 84484 ASSAY OF TROPONIN QUANT: CPT | Performed by: PHYSICIAN ASSISTANT

## 2018-12-14 PROCEDURE — 83880 ASSAY OF NATRIURETIC PEPTIDE: CPT | Performed by: PHYSICIAN ASSISTANT

## 2018-12-14 PROCEDURE — 93970 EXTREMITY STUDY: CPT

## 2018-12-14 PROCEDURE — 84443 ASSAY THYROID STIM HORMONE: CPT | Performed by: PHYSICIAN ASSISTANT

## 2018-12-14 PROCEDURE — 85610 PROTHROMBIN TIME: CPT | Performed by: PHYSICIAN ASSISTANT

## 2018-12-14 NOTE — ED NOTES
Patient was sent home yesterday  Since yesterday when they took the devices off of her legs she is having bad pain, especially in the right when she moves them  Besides that she has no other complaints  Patient states she cannot go to the bathroom yet, will get a UA when she can go       Miriam Thomas RN  12/14/18 1734

## 2018-12-14 NOTE — DISCHARGE INSTRUCTIONS
Altered Mental Status   WHAT YOU NEED TO KNOW:   Altered mental status (AMS) is a disruption in how your brain works that causes a change in behavior  This change can happen suddenly or over days  AMS ranges from slight confusion to total disorientation and increased sleepiness to coma  DISCHARGE INSTRUCTIONS:   Medicines:   · Antibiotics  help fight or prevent infection  Take your antibiotics until they are gone, even if you feel better  · Take your medicine as directed  Contact your healthcare provider if you think your medicine is not helping or if you have side effects  Tell him of her if you are allergic to any medicine  Keep a list of the medicines, vitamins, and herbs you take  Include the amounts, and when and why you take them  Bring the list or the pill bottles to follow-up visits  Carry your medicine list with you in case of an emergency  Follow up with your healthcare provider as directed:  Write down your questions so you remember to ask them during your visits  Contact your healthcare provider if:   · You have sudden changes in behavior  · You are more sleepy or confused than usual      · You have questions or concerns about your condition or care  Seek care immediately or call 911 if:   · Your speech is slurred or you are rambling  · You have a seizure  · You are not able to move any part of your body freely  · Someone close to you cannot wake you up  © 2017 [address] Information is for End User's use only and may not be sold, redistributed or otherwise used for commercial purposes  All illustrations and images included in CareNotes® are the copyrighted property of [employer]  or Nicol[employer] Reyes  The above information is an  only  It is not intended as medical advice for individual conditions or treatments   Talk to your doctor, nurse or pharmacist before following any medical regimen to see if it is safe and effective for you

## 2018-12-14 NOTE — ED PROVIDER NOTES
History  Chief Complaint   Patient presents with    Altered Mental Status     found by staff of [facility] with decreased level of conscious  Right side facial droop  Patient presents to the emergency department today via basic and advanced life support emergency medical services  She comes from [facility]  The call ahead from the [facility] stated that this is a 60-year-old female that awoke this morning and the staff noted that she had altered mental status she was only responsive verbal stimuli and they noted right-sided facial droop  Pre-hospital providers did give 2 5 mg of albuterol nebulizer and noted a normal blood sugar of 82 mg/dL  At bedside patient is alert to verbal stimuli  She knows that she is in the hospital   She does state that both of her legs hurt and she also has some right-sided chest pain  She does lean to the right hand side however no definite focal neurologic deficits are noted  She does obey commands  She does have an intravenous line in place in the left forearm region which appears to be for intravenous antibiotics potentially from a urinary tract infection that she is being treated for  She does present with a do not resuscitate order which shows that she does want hospitalization well as blood products however she does not want cardiac resuscitation or any tube feeding  Prior to Admission Medications   Prescriptions Last Dose Informant Patient Reported? Taking?    Multiple Vitamin (MULTIVITAMIN) tablet   Yes No   Sig: Take 1 tablet by mouth daily   acetaminophen (TYLENOL) 325 mg tablet   Yes No   Sig: Take 650 mg by mouth every 6 (six) hours as needed for mild pain   bisacodyl (DULCOLAX) 10 mg suppository   Yes No   Sig: Insert 10 mg into the rectum daily   cefepime (MAXIPIME) 1000 mg IVPB   No No   Sig: Infuse 50 mL (1,000 mg total) into a venous catheter every 24 hours for 2 days   collagenase (SANTYL) ointment   Yes No   Sig: Apply topically 3 (three) times a day   ferrous sulfate 325 (65 Fe) mg tablet   Yes No   Sig: Take 325 mg by mouth 2 (two) times a day with meals   levothyroxine 100 mcg tablet   No No   Sig: Take 1 tablet (100 mcg total) by mouth daily in the early morning   loratadine (CLARITIN) 10 mg tablet   Yes No   Sig: Take 10 mg by mouth daily   magnesium hydroxide (MILK OF MAGNESIA) 400 mg/5 mL oral suspension   Yes No   Sig: Take by mouth daily as needed for constipation   sertraline (ZOLOFT) 25 mg tablet   Yes No   Sig: Take 50 mg by mouth daily   sodium chloride (OCEAN) 0 65 % nasal spray   Yes No   Si spray into each nostril 4 (four) times a day as needed for congestion   tolterodine (DETROL LA) 4 mg 24 hr capsule   Yes No   Sig: Take 4 mg by mouth daily      Facility-Administered Medications: None       Past Medical History:   Diagnosis Date    Anemia     Arthritis     Cancer (HCC)     breast    Chronic respiratory failure (HCC)     Disease of thyroid gland     Encephalopathy     Hyperlipidemia     IBS (irritable bowel syndrome)     Osteoarthritis     Osteoporosis     Overactive bladder     Peripheral neuropathy     Polio     Poliomyelitis     Presbycusis     Renal disorder     Rhabdomyolysis     UTI (urinary tract infection)     Ventral hernia        Past Surgical History:   Procedure Laterality Date    APPENDECTOMY      BREAST SURGERY      right mastectomy       History reviewed  No pertinent family history  I have reviewed and agree with the history as documented  Social History   Substance Use Topics    Smoking status: Never Smoker    Smokeless tobacco: Never Used    Alcohol use No        Review of Systems   Unable to perform ROS: Mental status change       Physical Exam  Physical Exam   Constitutional: No distress  HENT:   Head: Normocephalic  Right Ear: External ear normal    Left Ear: External ear normal    Mouth/Throat: Oropharynx is clear and moist  No oropharyngeal exudate     Eyes: Pupils are equal, round, and reactive to light  Conjunctivae and EOM are normal    Neck: No JVD present  No tracheal deviation present  Cardiovascular: Normal rate, regular rhythm, normal heart sounds and intact distal pulses  Exam reveals no gallop  No murmur heard  Pulmonary/Chest: No stridor  Patient appears to have diffuse rhonchi without wheeze   Musculoskeletal: She exhibits no edema  Neurological: She is not disoriented  She displays no tremor  She displays no seizure activity  GCS eye subscore is 3  GCS verbal subscore is 5  GCS motor subscore is 6  Patient is responsive to verbal stimuli  Skin: Skin is warm  Capillary refill takes less than 2 seconds  She is not diaphoretic         Vital Signs  ED Triage Vitals [12/14/18 1005]   Temperature Pulse Respirations Blood Pressure SpO2   97 5 °F (36 4 °C) 70 18 120/58 98 %      Temp Source Heart Rate Source Patient Position - Orthostatic VS BP Location FiO2 (%)   Temporal Monitor Lying Right arm --      Pain Score       No Pain           Vitals:    12/14/18 1115 12/14/18 1130 12/14/18 1238 12/14/18 1340   BP: 116/58 125/56 112/56 120/57   Pulse: 72 68 69 71   Patient Position - Orthostatic VS: Sitting Sitting Sitting Lying       Visual Acuity  Visual Acuity      Most Recent Value   L Pupil Size (mm)  3   R Pupil Size (mm)  3   L Pupil Shape  Round   R Pupil Shape  Round          ED Medications  Medications - No data to display    Diagnostic Studies  Results Reviewed     Procedure Component Value Units Date/Time    Urine Microscopic [[id number]]  (Abnormal) Collected:  12/14/18 1348    Lab Status:  Final result Specimen:  Urine from Urine, Clean Catch Updated:  12/14/18 1409     RBC, UA 2-4 (A) /hpf      WBC, UA 30-50 (A) /hpf      Epithelial Cells Occasional /hpf      Bacteria, UA Moderate (A) /hpf      MUCUS THREADS Occasional (A)    UA w Reflex to Microscopic [[id number]]  (Abnormal) Collected:  12/14/18 1348    Lab Status:  Final result Specimen:  Urine from Urine, Clean Catch Updated:  12/14/18 1359     Color, UA Yellow     Clarity, UA Slightly Cloudy     Specific Gravity, UA 1 015     pH, UA 6 0     Leukocytes, UA Moderate (A)     Nitrite, UA Positive (A)     Protein, UA Trace (A) mg/dl      Glucose, UA Negative mg/dl      Ketones, UA Negative mg/dl      Urobilinogen, UA 0 2 E U /dl      Bilirubin, UA Negative     Blood, UA Small (A)    Blood culture #1 [[id number]] Collected:  12/14/18 1150    Lab Status: In process Specimen:  Blood from Arm, Left Updated:  12/14/18 1156    Comprehensive metabolic panel [[id number]]  (Abnormal) Collected:  12/14/18 1049    Lab Status:  Final result Specimen:  Blood from Arm, Left Updated:  12/14/18 1122     Sodium 144 mmol/L      Potassium 4 5 mmol/L      Chloride 108 mmol/L      CO2 32 mmol/L      ANION GAP 4 mmol/L      BUN 20 mg/dL      Creatinine 1 04 mg/dL      Glucose 82 mg/dL      Calcium 8 6 mg/dL      AST 22 U/L      ALT 23 U/L      Alkaline Phosphatase 74 U/L      Total Protein 6 1 (L) g/dL      Albumin 2 7 (L) g/dL      Total Bilirubin 0 30 mg/dL      eGFR 46 ml/min/1 73sq m     Narrative:         National Kidney Disease Education Program recommendations are as follows:  GFR calculation is accurate only with a steady state creatinine  Chronic Kidney disease less than 60 ml/min/1 73 sq  meters  Kidney failure less than 15 ml/min/1 73 sq  meters      B-type natriuretic peptide [[id number]]  (Normal) Collected:  12/14/18 1049    Lab Status:  Final result Specimen:  Blood from Arm, Left Updated:  12/14/18 1122     NT-proBNP 239 pg/mL     Magnesium [[id number]]  (Normal) Collected:  12/14/18 1049    Lab Status:  Final result Specimen:  Blood from Arm, Left Updated:  12/14/18 1122     Magnesium 2 0 mg/dL     TSH [[id number]]  (Abnormal) Collected:  12/14/18 1049    Lab Status:  Final result Specimen:  Blood from Arm, Left Updated:  12/14/18 1122     TSH 3RD GENERATON 8 269 (H) uIU/mL     Narrative:         Patients undergoing fluorescein dye angiography may retain small amounts of fluorescein in the body for 48-72 hours post procedure  Samples containing fluorescein can produce falsely depressed TSH values  If the patient had this procedure,a specimen should be resubmitted post fluorescein clearance  The recommended reference ranges for TSH during pregnancy are as follows:  First trimester 0 1 to 2 5 uIU/mL  Second trimester  0 2 to 3 0 uIU/mL  Third trimester 0 3 to 3 0 uIU/m      Troponin I [[id number]]  (Normal) Collected:  12/14/18 1049    Lab Status:  Final result Specimen:  Blood from Arm, Left Updated:  12/14/18 1120     Troponin I 0 02 ng/mL     Lactic acid, plasma [[id number]]  (Normal) Collected:  12/14/18 1049    Lab Status:  Final result Specimen:  Blood from Arm, Left Updated:  12/14/18 1118     LACTIC ACID 0 7 mmol/L     Narrative:         Result may be elevated if tourniquet was used during collection      Protime-INR [[id number]]  (Normal) Collected:  12/14/18 1049    Lab Status:  Final result Specimen:  Blood from Arm, Left Updated:  12/14/18 1106     Protime 12 5 seconds      INR 0 98    APTT [[id number]]  (Normal) Collected:  12/14/18 1049    Lab Status:  Final result Specimen:  Blood from Arm, Left Updated:  12/14/18 1106     PTT 28 seconds     CBC and differential [[id number]]  (Abnormal) Collected:  12/14/18 1049    Lab Status:  Final result Specimen:  Blood from Arm, Left Updated:  12/14/18 1057     WBC 8 81 Thousand/uL      RBC 3 28 (L) Million/uL      Hemoglobin 10 3 (L) g/dL      Hematocrit 32 9 (L) %       (H) fL      MCH 31 4 pg      MCHC 31 3 (L) g/dL      RDW 15 2 (H) %      MPV 9 2 fL      Platelets 910 Thousands/uL      nRBC 0 /100 WBCs      Neutrophils Relative 78 (H) %      Immat GRANS % 0 %      Lymphocytes Relative 12 (L) %      Monocytes Relative 5 %      Eosinophils Relative 5 %      Basophils Relative 0 %      Neutrophils Absolute 6 75 Thousands/µL      Immature Grans Absolute 0 03 Thousand/uL      Lymphocytes Absolute 1 08 Thousands/µL      Monocytes Absolute 0 45 Thousand/µL      Eosinophils Absolute 0 48 Thousand/µL      Basophils Absolute 0 02 Thousands/µL     Blood culture #2 [[id number]] Collected:  12/14/18 1049    Lab Status: In process Specimen:  Blood from Arm, Left Updated:  12/14/18 1054                 XR chest 1 view portable   ED Interpretation by Julius Titus PA-C (12/14 1103)   No changes from prior      Final Result by Danyelle Mark DO (12/14 1146)   No active pulmonary disease on examination which is somewhat limited secondary to low lung volumes  Workstation performed: [id number]         CT head without contrast   Final Result by Ciara Funes MD (12/14 1049)      No acute intracranial abnormality  Workstation performed: [id number]         VAS lower limb venous duplex study, complete bilateral    (Results Pending)              Procedures  Procedures       Phone Contacts  ED Phone Contact    ED Course  ED Course as of Dec 14 1439   Fri Dec 14, 2018   1101 stable Hemoglobin: (!) 10 3   1101 Impression       No acute intracranial abnormality  1115 INR: 0 98   1141 Magnesium: 2 0   1141 NT-proBNP: 239   1141 Sodium: 144   1141 CO2: 32   1141 AST: 22   1141 Albumin: (!) 2 7   1141 PTT: 28   1141 INR: 0 98   1142 WBC: 8 81   1158 Patient was re-evaluated again at 1150 hr   She has a remarkable increase in her level of mental status  She is alert and oriented  She knows exactly where she is  She is communicating very for fluidly and freely with staff in out of the room  200 Dr Serafin Payan aware, will see pt at bedside and we will make dispo    1352 Pt at ultrasound    1425 Nitrite, UA: (!) Positive   1425 Leukocytes, UA: (!) Moderate   1437 Pt was evaluated at bedside by Dr Serafin Payan, we believe this patient is back to her baseline and is stable for discharge back to the nursing home facility    She still receiving intravenous antibiotics  She does not have an elevated white blood cell count  She is not tachycardic hypotensive  Again altered mental status has returned baseline and then transfer back to [facility] with follow-up by primary care          HEART Risk Score      Most Recent Value   History  0 Filed at: 12/14/2018 1148   ECG  0 Filed at: 12/14/2018 1148   Age  2 Filed at: 12/14/2018 1148   Risk Factors  1 Filed at: 12/14/2018 1148   Troponin  0 Filed at: 12/14/2018 1148   Heart Score Risk Calculator   History  0 Filed at: 12/14/2018 1148   ECG  0 Filed at: 12/14/2018 1148   Age  2 Filed at: 12/14/2018 1148   Risk Factors  1 Filed at: 12/14/2018 1148   Troponin  0 Filed at: 12/14/2018 1148   HEART Score  3 Filed at: 12/14/2018 1148   HEART Score  3 Filed at: 12/14/2018 1148                            MDM  CritCare Time    Disposition  Final diagnoses: Altered mental status   UTI (urinary tract infection)     Time reflects when diagnosis was documented in both MDM as applicable and the Disposition within this note     Time User Action Codes Description Comment    12/14/2018  2:35 PM Charmaine Johns Add [R41 82] Altered mental status     12/14/2018  2:35 PM Lisbeth TINAJERO Add [N39 0] UTI (urinary tract infection)       ED Disposition     ED Disposition Condition Comment    Discharge  [facility] discharge to home/self care  Condition at discharge: Stable        Follow-up Information     Follow up With Specialties Details Why [facility],  Family Medicine Schedule an appointment as soon as possible for a visit  [address] [place] [address]  [phone]            Patient's Medications   Discharge Prescriptions    No medications on file     No discharge procedures on file      ED Provider  Electronically Signed by           José Morales PA-C  12/14/18 [address] [place] [address],[place] [address] Fernandez Lei PA-C  12/14/18 7467

## 2018-12-14 NOTE — ED NOTES
Limb alert applied to her right arm  That arm cannot be used  She is able to verbalize that BP cannot be taken on it  She is alert X 4, talkative, and is able to verbalize any concerns   Complaints of right leg pain when being moved     Natalie Soliz RN  12/14/18 4506

## 2018-12-14 NOTE — ED PROCEDURE NOTE
Procedure  ECG 12 Lead Documentation  Date/Time: 12/14/2018 11:46 AM  Performed by: Alexus Bernardo by: Abby Solis     Indications / Diagnosis:  Altered mental status  ECG reviewed by me, the ED Provider: yes    Patient location:  ED  Previous ECG:     Comparison to cardiac monitor: Yes    Interpretation:     Interpretation: normal    Rate:     ECG rate:  74    ECG rate assessment: normal    Rhythm:     Rhythm: sinus rhythm    Ectopy:     Ectopy: none    QRS:     QRS axis:  Normal    QRS intervals:  Normal  Conduction:     Conduction: abnormal      Abnormal conduction: incomplete RBBB    ST segments:     ST segments:  Normal  T waves:     T waves: normal                       Julius Titus PA-C  12/14/18 1147

## 2018-12-14 NOTE — ED NOTES
[facility] will transport pt back to [facility] @ approximately 1515        [facility]  12/14/18 4519

## 2018-12-16 NOTE — PROGRESS NOTES
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     VSS afeb  Gen NAD  Chest CTA b/l, referred upper respiratory striders  Heart RRR no murmurs  Extr no edema    Labs/imaging reviewed    AMS as reported by nursing home staff  Not observed on my examination  Workup unremarkable  Patient with recent admission to the hospital and completed course of Abx for UTI with adequate response  Venous doppler of LE was obtained to again evaluated chronic b/l leg pain and 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

## 2018-12-17 LAB
ATRIAL RATE: 74 BPM
P AXIS: 40 DEGREES
PR INTERVAL: 162 MS
QRS AXIS: 25 DEGREES
QRSD INTERVAL: 142 MS
QT INTERVAL: 434 MS
QTC INTERVAL: 481 MS
T WAVE AXIS: 28 DEGREES
VENTRICULAR RATE: 74 BPM

## 2018-12-17 PROCEDURE — 93010 ELECTROCARDIOGRAM REPORT: CPT | Performed by: INTERNAL MEDICINE

## 2018-12-19 LAB
BACTERIA BLD CULT: NORMAL
BACTERIA BLD CULT: NORMAL

## 2019-03-04 ENCOUNTER — HOSPITAL ENCOUNTER (INPATIENT)
Facility: HOSPITAL | Age: 84
LOS: 7 days | Discharge: NURSING, REHAB OR LONG-TERM CARE | DRG: 177 | End: 2019-03-11
Attending: EMERGENCY MEDICINE | Admitting: FAMILY MEDICINE
Payer: COMMERCIAL

## 2019-03-04 ENCOUNTER — APPOINTMENT (EMERGENCY)
Dept: RADIOLOGY | Facility: HOSPITAL | Age: 84
DRG: 177 | End: 2019-03-04
Payer: COMMERCIAL

## 2019-03-04 ENCOUNTER — APPOINTMENT (EMERGENCY)
Dept: CT IMAGING | Facility: HOSPITAL | Age: 84
DRG: 177 | End: 2019-03-04
Payer: COMMERCIAL

## 2019-03-04 DIAGNOSIS — D75.839 THROMBOCYTOSIS: ICD-10-CM

## 2019-03-04 DIAGNOSIS — A48.1 LEGIONELLA INFECTION (HCC): ICD-10-CM

## 2019-03-04 DIAGNOSIS — R23.9 MACERATION OF PERIWOUND SKIN: ICD-10-CM

## 2019-03-04 DIAGNOSIS — E46 MALNUTRITION (HCC): ICD-10-CM

## 2019-03-04 DIAGNOSIS — R55 SYNCOPE: ICD-10-CM

## 2019-03-04 DIAGNOSIS — E87.0 HYPERNATREMIA: ICD-10-CM

## 2019-03-04 DIAGNOSIS — J18.9 HEALTHCARE-ASSOCIATED PNEUMONIA: Primary | ICD-10-CM

## 2019-03-04 DIAGNOSIS — D72.829 LEUKOCYTOSIS: ICD-10-CM
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
## 2019-03-04 LAB
ALBUMIN SERPL BCP-MCNC: 1.8 G/DL (ref 3.5–5)
ALP SERPL-CCNC: 69 U/L (ref 46–116)
ALT SERPL W P-5'-P-CCNC: 16 U/L (ref 12–78)
ANION GAP SERPL CALCULATED.3IONS-SCNC: 3 MMOL/L (ref 4–13)
AST SERPL W P-5'-P-CCNC: 21 U/L (ref 5–45)
ATRIAL RATE: 84 BPM
ATRIAL RATE: 85 BPM
BASOPHILS # BLD AUTO: 0.05 THOUSANDS/ΜL (ref 0–0.1)
BASOPHILS NFR BLD AUTO: 0 % (ref 0–1)
BILIRUB SERPL-MCNC: 0.1 MG/DL (ref 0.2–1)
BUN SERPL-MCNC: 28 MG/DL (ref 5–25)
CALCIUM SERPL-MCNC: 8.8 MG/DL (ref 8.3–10.1)
CHLORIDE SERPL-SCNC: 104 MMOL/L (ref 100–108)
CO2 SERPL-SCNC: 35 MMOL/L (ref 21–32)
CREAT SERPL-MCNC: 1.47 MG/DL (ref 0.6–1.3)
EOSINOPHIL # BLD AUTO: 0.4 THOUSAND/ΜL (ref 0–0.61)
EOSINOPHIL NFR BLD AUTO: 4 % (ref 0–6)
ERYTHROCYTE [DISTWIDTH] IN BLOOD BY AUTOMATED COUNT: 14.9 % (ref 11.6–15.1)
GFR SERPL CREATININE-BSD FRML MDRD: 30 ML/MIN/1.73SQ M
GLUCOSE SERPL-MCNC: 118 MG/DL (ref 65–140)
HCT VFR BLD AUTO: 34.2 % (ref 34.8–46.1)
HGB BLD-MCNC: 10.1 G/DL (ref 11.5–15.4)
IMM GRANULOCYTES # BLD AUTO: 0.1 THOUSAND/UL (ref 0–0.2)
IMM GRANULOCYTES NFR BLD AUTO: 1 % (ref 0–2)
INR PPP: 1.18 (ref 0.86–1.17)
LACTATE SERPL-SCNC: 1 MMOL/L (ref 0.5–2)
LYMPHOCYTES # BLD AUTO: 1.82 THOUSANDS/ΜL (ref 0.6–4.47)
LYMPHOCYTES NFR BLD AUTO: 16 % (ref 14–44)
MAGNESIUM SERPL-MCNC: 2.2 MG/DL (ref 1.6–2.6)
MCH RBC QN AUTO: 30.5 PG (ref 26.8–34.3)
MCHC RBC AUTO-ENTMCNC: 29.5 G/DL (ref 31.4–37.4)
MCV RBC AUTO: 103 FL (ref 82–98)
MONOCYTES # BLD AUTO: 1 THOUSAND/ΜL (ref 0.17–1.22)
MONOCYTES NFR BLD AUTO: 9 % (ref 4–12)
NEUTROPHILS # BLD AUTO: 7.94 THOUSANDS/ΜL (ref 1.85–7.62)
NEUTS SEG NFR BLD AUTO: 70 % (ref 43–75)
NRBC BLD AUTO-RTO: 0 /100 WBCS
NT-PROBNP SERPL-MCNC: 327 PG/ML
P AXIS: 24 DEGREES
P AXIS: 68 DEGREES
PLATELET # BLD AUTO: 717 THOUSANDS/UL (ref 149–390)
PMV BLD AUTO: 8.4 FL (ref 8.9–12.7)
POTASSIUM SERPL-SCNC: 4 MMOL/L (ref 3.5–5.3)
PR INTERVAL: 148 MS
PR INTERVAL: 162 MS
PROT SERPL-MCNC: 5.9 G/DL (ref 6.4–8.2)
PROTHROMBIN TIME: 14.4 SECONDS (ref 11.8–14.2)
QRS AXIS: 30 DEGREES
QRS AXIS: 43 DEGREES
QRSD INTERVAL: 144 MS
QRSD INTERVAL: 148 MS
QT INTERVAL: 406 MS
QT INTERVAL: 414 MS
QTC INTERVAL: 483 MS
QTC INTERVAL: 489 MS
RBC # BLD AUTO: 3.31 MILLION/UL (ref 3.81–5.12)
SODIUM SERPL-SCNC: 142 MMOL/L (ref 136–145)
T WAVE AXIS: 19 DEGREES
T WAVE AXIS: 27 DEGREES
TROPONIN I SERPL-MCNC: 0.02 NG/ML
VENTRICULAR RATE: 84 BPM
VENTRICULAR RATE: 85 BPM
WBC # BLD AUTO: 11.31 THOUSAND/UL (ref 4.31–10.16)

## 2019-03-04 PROCEDURE — 70450 CT HEAD/BRAIN W/O DYE: CPT

## 2019-03-04 PROCEDURE — 99223 1ST HOSP IP/OBS HIGH 75: CPT | Performed by: NURSE PRACTITIONER

## 2019-03-04 PROCEDURE — 87631 RESP VIRUS 3-5 TARGETS: CPT | Performed by: NURSE PRACTITIONER

## 2019-03-04 PROCEDURE — 85025 COMPLETE CBC W/AUTO DIFF WBC: CPT | Performed by: EMERGENCY MEDICINE

## 2019-03-04 PROCEDURE — 84145 PROCALCITONIN (PCT): CPT | Performed by: NURSE PRACTITIONER

## 2019-03-04 PROCEDURE — 83605 ASSAY OF LACTIC ACID: CPT | Performed by: NURSE PRACTITIONER

## 2019-03-04 PROCEDURE — 84484 ASSAY OF TROPONIN QUANT: CPT | Performed by: EMERGENCY MEDICINE

## 2019-03-04 PROCEDURE — 71046 X-RAY EXAM CHEST 2 VIEWS: CPT

## 2019-03-04 PROCEDURE — 99285 EMERGENCY DEPT VISIT HI MDM: CPT

## 2019-03-04 PROCEDURE — 94664 DEMO&/EVAL PT USE INHALER: CPT

## 2019-03-04 PROCEDURE — 93010 ELECTROCARDIOGRAM REPORT: CPT | Performed by: INTERNAL MEDICINE

## 2019-03-04 PROCEDURE — 83735 ASSAY OF MAGNESIUM: CPT | Performed by: EMERGENCY MEDICINE

## 2019-03-04 PROCEDURE — 93005 ELECTROCARDIOGRAM TRACING: CPT

## 2019-03-04 PROCEDURE — 80053 COMPREHEN METABOLIC PANEL: CPT | Performed by: EMERGENCY MEDICINE

## 2019-03-04 PROCEDURE — 83880 ASSAY OF NATRIURETIC PEPTIDE: CPT | Performed by: EMERGENCY MEDICINE

## 2019-03-04 PROCEDURE — 36415 COLL VENOUS BLD VENIPUNCTURE: CPT | Performed by: EMERGENCY MEDICINE

## 2019-03-04 PROCEDURE — 87040 BLOOD CULTURE FOR BACTERIA: CPT | Performed by: EMERGENCY MEDICINE

## 2019-03-04 PROCEDURE — 87081 CULTURE SCREEN ONLY: CPT | Performed by: FAMILY MEDICINE

## 2019-03-04 PROCEDURE — 85610 PROTHROMBIN TIME: CPT | Performed by: EMERGENCY MEDICINE

## 2019-03-04 PROCEDURE — 94760 N-INVAS EAR/PLS OXIMETRY 1: CPT

## 2019-03-04 RX ORDER — LEVOTHYROXINE SODIUM 0.1 MG/1
100 TABLET ORAL
Status: DISCONTINUED | OUTPATIENT
Start: 2019-03-05 | End: 2019-03-11 | Stop reason: HOSPADM

## 2019-03-04 RX ORDER — CEFEPIME HYDROCHLORIDE 1 G/50ML
1000 INJECTION, SOLUTION INTRAVENOUS EVERY 12 HOURS
Status: DISCONTINUED | OUTPATIENT
Start: 2019-03-05 | End: 2019-03-06

## 2019-03-04 RX ORDER — ALBUTEROL SULFATE 2.5 MG/3ML
2.5 SOLUTION RESPIRATORY (INHALATION) EVERY 4 HOURS PRN
Status: DISCONTINUED | OUTPATIENT
Start: 2019-03-04 | End: 2019-03-11 | Stop reason: HOSPADM

## 2019-03-04 RX ORDER — HEPARIN SODIUM 5000 [USP'U]/ML
5000 INJECTION, SOLUTION INTRAVENOUS; SUBCUTANEOUS EVERY 8 HOURS SCHEDULED
Status: DISCONTINUED | OUTPATIENT
Start: 2019-03-04 | End: 2019-03-11 | Stop reason: HOSPADM

## 2019-03-04 RX ORDER — 0.9 % SODIUM CHLORIDE 0.9 %
3 VIAL (ML) INJECTION AS NEEDED
Status: DISCONTINUED | OUTPATIENT
Start: 2019-03-04 | End: 2019-03-11 | Stop reason: HOSPADM

## 2019-03-04 RX ORDER — SODIUM CHLORIDE 9 MG/ML
75 INJECTION, SOLUTION INTRAVENOUS CONTINUOUS
Status: DISCONTINUED | OUTPATIENT
Start: 2019-03-04 | End: 2019-03-05

## 2019-03-04 RX ORDER — ONDANSETRON 4 MG/1
4 TABLET, FILM COATED ORAL EVERY 8 HOURS PRN
COMMUNITY

## 2019-03-04 RX ORDER — CEFEPIME HYDROCHLORIDE 2 G/50ML
2000 INJECTION, SOLUTION INTRAVENOUS ONCE
Status: COMPLETED | OUTPATIENT
Start: 2019-03-04 | End: 2019-03-04

## 2019-03-04 RX ORDER — FERROUS SULFATE 325(65) MG
325 TABLET ORAL 2 TIMES DAILY WITH MEALS
Status: DISCONTINUED | OUTPATIENT
Start: 2019-03-05 | End: 2019-03-11 | Stop reason: HOSPADM

## 2019-03-04 RX ORDER — OXYBUTYNIN CHLORIDE 5 MG/1
5 TABLET, EXTENDED RELEASE ORAL DAILY
Status: DISCONTINUED | OUTPATIENT
Start: 2019-03-05 | End: 2019-03-11 | Stop reason: HOSPADM

## 2019-03-04 RX ADMIN — CEFEPIME HYDROCHLORIDE 2000 MG: 2 INJECTION, SOLUTION INTRAVENOUS at 19:31

## 2019-03-04 RX ADMIN — SODIUM CHLORIDE, SODIUM LACTATE, POTASSIUM CHLORIDE, AND CALCIUM CHLORIDE 1000 ML: .6; .31; .03; .02 INJECTION, SOLUTION INTRAVENOUS at 19:31

## 2019-03-04 RX ADMIN — SODIUM CHLORIDE 75 ML/HR: 0.9 INJECTION, SOLUTION INTRAVENOUS at 21:40

## 2019-03-05 PROBLEM — R40.4 ALTERED AWARENESS, TRANSIENT: Status: ACTIVE | Noted: 2019-03-05

## 2019-03-05 PROBLEM — J96.21 ACUTE ON CHRONIC RESPIRATORY FAILURE WITH HYPOXIA (HCC): Status: ACTIVE | Noted: 2019-03-05

## 2019-03-05 PROBLEM — R13.19 OTHER DYSPHAGIA: Status: ACTIVE | Noted: 2018-12-07

## 2019-03-05 LAB
ANION GAP SERPL CALCULATED.3IONS-SCNC: 3 MMOL/L (ref 4–13)
BUN SERPL-MCNC: 27 MG/DL (ref 5–25)
CALCIUM SERPL-MCNC: 8 MG/DL (ref 8.3–10.1)
CHLORIDE SERPL-SCNC: 107 MMOL/L (ref 100–108)
CO2 SERPL-SCNC: 32 MMOL/L (ref 21–32)
CREAT SERPL-MCNC: 1.22 MG/DL (ref 0.6–1.3)
ERYTHROCYTE [DISTWIDTH] IN BLOOD BY AUTOMATED COUNT: 14.8 % (ref 11.6–15.1)
FLUAV AG SPEC QL: NOT DETECTED
FLUBV AG SPEC QL: NOT DETECTED
GFR SERPL CREATININE-BSD FRML MDRD: 38 ML/MIN/1.73SQ M
GLUCOSE SERPL-MCNC: 85 MG/DL (ref 65–140)
HCT VFR BLD AUTO: 28.8 % (ref 34.8–46.1)
HGB BLD-MCNC: 8.4 G/DL (ref 11.5–15.4)
MAGNESIUM SERPL-MCNC: 2 MG/DL (ref 1.6–2.6)
MCH RBC QN AUTO: 30.4 PG (ref 26.8–34.3)
MCHC RBC AUTO-ENTMCNC: 29.2 G/DL (ref 31.4–37.4)
MCV RBC AUTO: 104 FL (ref 82–98)
PHOSPHATE SERPL-MCNC: 3.3 MG/DL (ref 2.3–4.1)
PLATELET # BLD AUTO: 607 THOUSANDS/UL (ref 149–390)
PMV BLD AUTO: 8.6 FL (ref 8.9–12.7)
POTASSIUM SERPL-SCNC: 3.6 MMOL/L (ref 3.5–5.3)
PROCALCITONIN SERPL-MCNC: 0.23 NG/ML
RBC # BLD AUTO: 2.76 MILLION/UL (ref 3.81–5.12)
RSV B RNA SPEC QL NAA+PROBE: NOT DETECTED
SODIUM SERPL-SCNC: 142 MMOL/L (ref 136–145)
WBC # BLD AUTO: 8.58 THOUSAND/UL (ref 4.31–10.16)

## 2019-03-05 PROCEDURE — 87449 NOS EACH ORGANISM AG IA: CPT | Performed by: NURSE PRACTITIONER

## 2019-03-05 PROCEDURE — G8998 SWALLOW D/C STATUS: HCPCS

## 2019-03-05 PROCEDURE — 92610 EVALUATE SWALLOWING FUNCTION: CPT

## 2019-03-05 PROCEDURE — 80048 BASIC METABOLIC PNL TOTAL CA: CPT | Performed by: NURSE PRACTITIONER

## 2019-03-05 PROCEDURE — G8979 MOBILITY GOAL STATUS: HCPCS | Performed by: PHYSICAL THERAPIST

## 2019-03-05 PROCEDURE — 83735 ASSAY OF MAGNESIUM: CPT | Performed by: NURSE PRACTITIONER

## 2019-03-05 PROCEDURE — 99232 SBSQ HOSP IP/OBS MODERATE 35: CPT | Performed by: FAMILY MEDICINE

## 2019-03-05 PROCEDURE — G8996 SWALLOW CURRENT STATUS: HCPCS

## 2019-03-05 PROCEDURE — G8997 SWALLOW GOAL STATUS: HCPCS

## 2019-03-05 PROCEDURE — 84100 ASSAY OF PHOSPHORUS: CPT | Performed by: NURSE PRACTITIONER

## 2019-03-05 PROCEDURE — 85027 COMPLETE CBC AUTOMATED: CPT | Performed by: NURSE PRACTITIONER

## 2019-03-05 PROCEDURE — G8978 MOBILITY CURRENT STATUS: HCPCS | Performed by: PHYSICAL THERAPIST

## 2019-03-05 PROCEDURE — 97163 PT EVAL HIGH COMPLEX 45 MIN: CPT | Performed by: PHYSICAL THERAPIST

## 2019-03-05 RX ADMIN — FERROUS SULFATE TAB 325 MG (65 MG ELEMENTAL FE) 325 MG: 325 (65 FE) TAB at 17:19

## 2019-03-05 RX ADMIN — HEPARIN SODIUM 5000 UNITS: 5000 INJECTION, SOLUTION INTRAVENOUS; SUBCUTANEOUS at 13:35

## 2019-03-05 RX ADMIN — HEPARIN SODIUM 5000 UNITS: 5000 INJECTION, SOLUTION INTRAVENOUS; SUBCUTANEOUS at 05:24

## 2019-03-05 RX ADMIN — OXYBUTYNIN CHLORIDE 5 MG: 5 TABLET, EXTENDED RELEASE ORAL at 08:33

## 2019-03-05 RX ADMIN — CEFEPIME HYDROCHLORIDE 1000 MG: 1 INJECTION, SOLUTION INTRAVENOUS at 08:33

## 2019-03-05 RX ADMIN — FERROUS SULFATE TAB 325 MG (65 MG ELEMENTAL FE) 325 MG: 325 (65 FE) TAB at 08:33

## 2019-03-05 RX ADMIN — HEPARIN SODIUM 5000 UNITS: 5000 INJECTION, SOLUTION INTRAVENOUS; SUBCUTANEOUS at 21:42

## 2019-03-05 RX ADMIN — SODIUM CHLORIDE 75 ML/HR: 0.9 INJECTION, SOLUTION INTRAVENOUS at 11:43

## 2019-03-05 RX ADMIN — LEVOTHYROXINE SODIUM 100 MCG: 100 TABLET ORAL at 05:24

## 2019-03-05 RX ADMIN — SERTRALINE HYDROCHLORIDE 50 MG: 50 TABLET ORAL at 08:33

## 2019-03-05 RX ADMIN — CEFEPIME HYDROCHLORIDE 1000 MG: 1 INJECTION, SOLUTION INTRAVENOUS at 19:34

## 2019-03-06 ENCOUNTER — APPOINTMENT (INPATIENT)
Dept: RADIOLOGY | Facility: HOSPITAL | Age: 84
DRG: 177 | End: 2019-03-06
Payer: COMMERCIAL

## 2019-03-06 PROBLEM — N18.9 ACUTE KIDNEY INJURY SUPERIMPOSED ON CHRONIC KIDNEY DISEASE (HCC): Status: ACTIVE | Noted: 2019-03-06

## 2019-03-06 PROBLEM — N18.30 CKD (CHRONIC KIDNEY DISEASE) STAGE 3, GFR 30-59 ML/MIN (HCC): Status: ACTIVE | Noted: 2019-03-06

## 2019-03-06 PROBLEM — N17.9 ACUTE KIDNEY INJURY SUPERIMPOSED ON CHRONIC KIDNEY DISEASE (HCC): Status: ACTIVE | Noted: 2019-03-06

## 2019-03-06 PROBLEM — A48.1 LEGIONELLA INFECTION (HCC): Status: ACTIVE | Noted: 2019-03-04

## 2019-03-06 LAB
ANION GAP SERPL CALCULATED.3IONS-SCNC: 4 MMOL/L (ref 4–13)
BASOPHILS # BLD AUTO: 0.06 THOUSANDS/ΜL (ref 0–0.1)
BASOPHILS NFR BLD AUTO: 1 % (ref 0–1)
BUN SERPL-MCNC: 23 MG/DL (ref 5–25)
CALCIUM SERPL-MCNC: 7.8 MG/DL (ref 8.3–10.1)
CHLORIDE SERPL-SCNC: 110 MMOL/L (ref 100–108)
CO2 SERPL-SCNC: 30 MMOL/L (ref 21–32)
CREAT SERPL-MCNC: 1.17 MG/DL (ref 0.6–1.3)
EOSINOPHIL # BLD AUTO: 0.3 THOUSAND/ΜL (ref 0–0.61)
EOSINOPHIL NFR BLD AUTO: 4 % (ref 0–6)
ERYTHROCYTE [DISTWIDTH] IN BLOOD BY AUTOMATED COUNT: 14.6 % (ref 11.6–15.1)
FERRITIN SERPL-MCNC: 74 NG/ML (ref 8–388)
FERRITIN SERPL-MCNC: 75 NG/ML (ref 8–388)
GFR SERPL CREATININE-BSD FRML MDRD: 40 ML/MIN/1.73SQ M
GLUCOSE SERPL-MCNC: 74 MG/DL (ref 65–140)
HCT VFR BLD AUTO: 27.1 % (ref 34.8–46.1)
HGB BLD-MCNC: 7.8 G/DL (ref 11.5–15.4)
IMM GRANULOCYTES # BLD AUTO: 0.05 THOUSAND/UL (ref 0–0.2)
IMM GRANULOCYTES NFR BLD AUTO: 1 % (ref 0–2)
IRON SATN MFR SERPL: 12 %
IRON SERPL-MCNC: 20 UG/DL (ref 50–170)
L PNEUMO1 AG UR QL IA.RAPID: POSITIVE
LYMPHOCYTES # BLD AUTO: 1.2 THOUSANDS/ΜL (ref 0.6–4.47)
LYMPHOCYTES NFR BLD AUTO: 15 % (ref 14–44)
MCH RBC QN AUTO: 30.1 PG (ref 26.8–34.3)
MCHC RBC AUTO-ENTMCNC: 28.8 G/DL (ref 31.4–37.4)
MCV RBC AUTO: 105 FL (ref 82–98)
MONOCYTES # BLD AUTO: 0.54 THOUSAND/ΜL (ref 0.17–1.22)
MONOCYTES NFR BLD AUTO: 7 % (ref 4–12)
MRSA NOSE QL CULT: NORMAL
NEUTROPHILS # BLD AUTO: 5.64 THOUSANDS/ΜL (ref 1.85–7.62)
NEUTS SEG NFR BLD AUTO: 72 % (ref 43–75)
NRBC BLD AUTO-RTO: 0 /100 WBCS
PLATELET # BLD AUTO: 649 THOUSANDS/UL (ref 149–390)
PMV BLD AUTO: 8.8 FL (ref 8.9–12.7)
POTASSIUM SERPL-SCNC: 3.9 MMOL/L (ref 3.5–5.3)
PROCALCITONIN SERPL-MCNC: 0.2 NG/ML
RBC # BLD AUTO: 2.59 MILLION/UL (ref 3.81–5.12)
S PNEUM AG UR QL: NEGATIVE
SODIUM SERPL-SCNC: 144 MMOL/L (ref 136–145)
TIBC SERPL-MCNC: 162 UG/DL (ref 250–450)
WBC # BLD AUTO: 7.79 THOUSAND/UL (ref 4.31–10.16)

## 2019-03-06 PROCEDURE — G8997 SWALLOW GOAL STATUS: HCPCS

## 2019-03-06 PROCEDURE — 83540 ASSAY OF IRON: CPT | Performed by: FAMILY MEDICINE

## 2019-03-06 PROCEDURE — 84145 PROCALCITONIN (PCT): CPT | Performed by: FAMILY MEDICINE

## 2019-03-06 PROCEDURE — 92611 MOTION FLUOROSCOPY/SWALLOW: CPT

## 2019-03-06 PROCEDURE — 87205 SMEAR GRAM STAIN: CPT | Performed by: FAMILY MEDICINE

## 2019-03-06 PROCEDURE — 82728 ASSAY OF FERRITIN: CPT | Performed by: FAMILY MEDICINE

## 2019-03-06 PROCEDURE — 74230 X-RAY XM SWLNG FUNCJ C+: CPT

## 2019-03-06 PROCEDURE — 80048 BASIC METABOLIC PNL TOTAL CA: CPT | Performed by: FAMILY MEDICINE

## 2019-03-06 PROCEDURE — 83550 IRON BINDING TEST: CPT | Performed by: FAMILY MEDICINE

## 2019-03-06 PROCEDURE — G8996 SWALLOW CURRENT STATUS: HCPCS

## 2019-03-06 PROCEDURE — 87070 CULTURE OTHR SPECIMN AEROBIC: CPT | Performed by: FAMILY MEDICINE

## 2019-03-06 PROCEDURE — G8998 SWALLOW D/C STATUS: HCPCS

## 2019-03-06 PROCEDURE — 85025 COMPLETE CBC W/AUTO DIFF WBC: CPT | Performed by: FAMILY MEDICINE

## 2019-03-06 PROCEDURE — 99233 SBSQ HOSP IP/OBS HIGH 50: CPT | Performed by: FAMILY MEDICINE

## 2019-03-06 RX ORDER — LEVOFLOXACIN 5 MG/ML
750 INJECTION, SOLUTION INTRAVENOUS
Status: DISCONTINUED | OUTPATIENT
Start: 2019-03-06 | End: 2019-03-11 | Stop reason: HOSPADM

## 2019-03-06 RX ADMIN — FERROUS SULFATE TAB 325 MG (65 MG ELEMENTAL FE) 325 MG: 325 (65 FE) TAB at 17:07

## 2019-03-06 RX ADMIN — LEVOFLOXACIN 750 MG: 5 INJECTION, SOLUTION INTRAVENOUS at 12:00

## 2019-03-06 RX ADMIN — SERTRALINE HYDROCHLORIDE 50 MG: 50 TABLET ORAL at 08:38

## 2019-03-06 RX ADMIN — OXYBUTYNIN CHLORIDE 5 MG: 5 TABLET, EXTENDED RELEASE ORAL at 08:38

## 2019-03-06 RX ADMIN — HEPARIN SODIUM 5000 UNITS: 5000 INJECTION, SOLUTION INTRAVENOUS; SUBCUTANEOUS at 05:43

## 2019-03-06 RX ADMIN — HEPARIN SODIUM 5000 UNITS: 5000 INJECTION, SOLUTION INTRAVENOUS; SUBCUTANEOUS at 13:57

## 2019-03-06 RX ADMIN — LEVOTHYROXINE SODIUM 100 MCG: 100 TABLET ORAL at 05:43

## 2019-03-06 RX ADMIN — CEFEPIME HYDROCHLORIDE 1000 MG: 1 INJECTION, SOLUTION INTRAVENOUS at 08:37

## 2019-03-06 RX ADMIN — FERROUS SULFATE TAB 325 MG (65 MG ELEMENTAL FE) 325 MG: 325 (65 FE) TAB at 08:37

## 2019-03-06 RX ADMIN — HEPARIN SODIUM 5000 UNITS: 5000 INJECTION, SOLUTION INTRAVENOUS; SUBCUTANEOUS at 21:14

## 2019-03-07 PROBLEM — E44.0 MODERATE PROTEIN-CALORIE MALNUTRITION (HCC): Status: ACTIVE | Noted: 2019-03-07

## 2019-03-07 LAB
ANION GAP SERPL CALCULATED.3IONS-SCNC: 3 MMOL/L (ref 4–13)
BASOPHILS # BLD AUTO: 0.06 THOUSANDS/ΜL (ref 0–0.1)
BASOPHILS NFR BLD AUTO: 1 % (ref 0–1)
BUN SERPL-MCNC: 20 MG/DL (ref 5–25)
CALCIUM SERPL-MCNC: 7.9 MG/DL (ref 8.3–10.1)
CHLORIDE SERPL-SCNC: 108 MMOL/L (ref 100–108)
CO2 SERPL-SCNC: 33 MMOL/L (ref 21–32)
CREAT SERPL-MCNC: 1.19 MG/DL (ref 0.6–1.3)
EOSINOPHIL # BLD AUTO: 0.23 THOUSAND/ΜL (ref 0–0.61)
EOSINOPHIL NFR BLD AUTO: 3 % (ref 0–6)
ERYTHROCYTE [DISTWIDTH] IN BLOOD BY AUTOMATED COUNT: 14.6 % (ref 11.6–15.1)
FOLATE SERPL-MCNC: >20 NG/ML (ref 3.1–17.5)
GFR SERPL CREATININE-BSD FRML MDRD: 39 ML/MIN/1.73SQ M
GLUCOSE SERPL-MCNC: 80 MG/DL (ref 65–140)
HCT VFR BLD AUTO: 26.8 % (ref 34.8–46.1)
HGB BLD-MCNC: 7.7 G/DL (ref 11.5–15.4)
IMM GRANULOCYTES # BLD AUTO: 0.07 THOUSAND/UL (ref 0–0.2)
IMM GRANULOCYTES NFR BLD AUTO: 1 % (ref 0–2)
LYMPHOCYTES # BLD AUTO: 1.26 THOUSANDS/ΜL (ref 0.6–4.47)
LYMPHOCYTES NFR BLD AUTO: 15 % (ref 14–44)
MCH RBC QN AUTO: 30.1 PG (ref 26.8–34.3)
MCHC RBC AUTO-ENTMCNC: 28.7 G/DL (ref 31.4–37.4)
MCV RBC AUTO: 105 FL (ref 82–98)
MONOCYTES # BLD AUTO: 0.75 THOUSAND/ΜL (ref 0.17–1.22)
MONOCYTES NFR BLD AUTO: 9 % (ref 4–12)
NEUTROPHILS # BLD AUTO: 6.3 THOUSANDS/ΜL (ref 1.85–7.62)
NEUTS SEG NFR BLD AUTO: 71 % (ref 43–75)
NRBC BLD AUTO-RTO: 0 /100 WBCS
PLATELET # BLD AUTO: 600 THOUSANDS/UL (ref 149–390)
PMV BLD AUTO: 8.6 FL (ref 8.9–12.7)
POTASSIUM SERPL-SCNC: 3.8 MMOL/L (ref 3.5–5.3)
PROCALCITONIN SERPL-MCNC: 0.2 NG/ML
RBC # BLD AUTO: 2.56 MILLION/UL (ref 3.81–5.12)
SODIUM SERPL-SCNC: 144 MMOL/L (ref 136–145)
VIT B12 SERPL-MCNC: 969 PG/ML (ref 100–900)
WBC # BLD AUTO: 8.67 THOUSAND/UL (ref 4.31–10.16)

## 2019-03-07 PROCEDURE — 82272 OCCULT BLD FECES 1-3 TESTS: CPT | Performed by: FAMILY MEDICINE

## 2019-03-07 PROCEDURE — 97110 THERAPEUTIC EXERCISES: CPT

## 2019-03-07 PROCEDURE — 99232 SBSQ HOSP IP/OBS MODERATE 35: CPT | Performed by: FAMILY MEDICINE

## 2019-03-07 PROCEDURE — 82746 ASSAY OF FOLIC ACID SERUM: CPT | Performed by: FAMILY MEDICINE

## 2019-03-07 PROCEDURE — 85025 COMPLETE CBC W/AUTO DIFF WBC: CPT | Performed by: FAMILY MEDICINE

## 2019-03-07 PROCEDURE — 82607 VITAMIN B-12: CPT | Performed by: FAMILY MEDICINE

## 2019-03-07 PROCEDURE — 80048 BASIC METABOLIC PNL TOTAL CA: CPT | Performed by: FAMILY MEDICINE

## 2019-03-07 PROCEDURE — 84145 PROCALCITONIN (PCT): CPT | Performed by: FAMILY MEDICINE

## 2019-03-07 RX ORDER — SENNOSIDES 8.6 MG
1 TABLET ORAL
Status: DISCONTINUED | OUTPATIENT
Start: 2019-03-07 | End: 2019-03-11 | Stop reason: HOSPADM

## 2019-03-07 RX ADMIN — LEVOTHYROXINE SODIUM 100 MCG: 100 TABLET ORAL at 05:16

## 2019-03-07 RX ADMIN — SENNOSIDES 8.6 MG: 8.6 TABLET, FILM COATED ORAL at 22:11

## 2019-03-07 RX ADMIN — HEPARIN SODIUM 5000 UNITS: 5000 INJECTION, SOLUTION INTRAVENOUS; SUBCUTANEOUS at 22:09

## 2019-03-07 RX ADMIN — HEPARIN SODIUM 5000 UNITS: 5000 INJECTION, SOLUTION INTRAVENOUS; SUBCUTANEOUS at 05:15

## 2019-03-07 RX ADMIN — FERROUS SULFATE TAB 325 MG (65 MG ELEMENTAL FE) 325 MG: 325 (65 FE) TAB at 18:08

## 2019-03-07 RX ADMIN — HEPARIN SODIUM 5000 UNITS: 5000 INJECTION, SOLUTION INTRAVENOUS; SUBCUTANEOUS at 13:43

## 2019-03-08 LAB
ANION GAP SERPL CALCULATED.3IONS-SCNC: 6 MMOL/L (ref 4–13)
BACTERIA SPT RESP CULT: ABNORMAL
BACTERIA SPT RESP CULT: ABNORMAL
BASOPHILS # BLD AUTO: 0.06 THOUSANDS/ΜL (ref 0–0.1)
BASOPHILS NFR BLD AUTO: 1 % (ref 0–1)
BUN SERPL-MCNC: 17 MG/DL (ref 5–25)
CALCIUM SERPL-MCNC: 8.1 MG/DL (ref 8.3–10.1)
CHLORIDE SERPL-SCNC: 109 MMOL/L (ref 100–108)
CO2 SERPL-SCNC: 30 MMOL/L (ref 21–32)
CREAT SERPL-MCNC: 1.04 MG/DL (ref 0.6–1.3)
EOSINOPHIL # BLD AUTO: 0.36 THOUSAND/ΜL (ref 0–0.61)
EOSINOPHIL NFR BLD AUTO: 4 % (ref 0–6)
ERYTHROCYTE [DISTWIDTH] IN BLOOD BY AUTOMATED COUNT: 14.6 % (ref 11.6–15.1)
GFR SERPL CREATININE-BSD FRML MDRD: 46 ML/MIN/1.73SQ M
GLUCOSE SERPL-MCNC: 62 MG/DL (ref 65–140)
GRAM STN SPEC: ABNORMAL
HCT VFR BLD AUTO: 28.5 % (ref 34.8–46.1)
HGB BLD-MCNC: 8.2 G/DL (ref 11.5–15.4)
IMM GRANULOCYTES # BLD AUTO: 0.05 THOUSAND/UL (ref 0–0.2)
IMM GRANULOCYTES NFR BLD AUTO: 1 % (ref 0–2)
LYMPHOCYTES # BLD AUTO: 1.56 THOUSANDS/ΜL (ref 0.6–4.47)
LYMPHOCYTES NFR BLD AUTO: 19 % (ref 14–44)
MCH RBC QN AUTO: 30.3 PG (ref 26.8–34.3)
MCHC RBC AUTO-ENTMCNC: 28.8 G/DL (ref 31.4–37.4)
MCV RBC AUTO: 105 FL (ref 82–98)
MONOCYTES # BLD AUTO: 0.78 THOUSAND/ΜL (ref 0.17–1.22)
MONOCYTES NFR BLD AUTO: 10 % (ref 4–12)
NEUTROPHILS # BLD AUTO: 5.4 THOUSANDS/ΜL (ref 1.85–7.62)
NEUTS SEG NFR BLD AUTO: 65 % (ref 43–75)
NRBC BLD AUTO-RTO: 0 /100 WBCS
PLATELET # BLD AUTO: 643 THOUSANDS/UL (ref 149–390)
PMV BLD AUTO: 8.7 FL (ref 8.9–12.7)
POTASSIUM SERPL-SCNC: 3.7 MMOL/L (ref 3.5–5.3)
RBC # BLD AUTO: 2.71 MILLION/UL (ref 3.81–5.12)
SODIUM SERPL-SCNC: 145 MMOL/L (ref 136–145)
WBC # BLD AUTO: 8.21 THOUSAND/UL (ref 4.31–10.16)

## 2019-03-08 PROCEDURE — 80048 BASIC METABOLIC PNL TOTAL CA: CPT | Performed by: FAMILY MEDICINE

## 2019-03-08 PROCEDURE — 97110 THERAPEUTIC EXERCISES: CPT

## 2019-03-08 PROCEDURE — 99232 SBSQ HOSP IP/OBS MODERATE 35: CPT | Performed by: FAMILY MEDICINE

## 2019-03-08 PROCEDURE — 85025 COMPLETE CBC W/AUTO DIFF WBC: CPT | Performed by: FAMILY MEDICINE

## 2019-03-08 RX ORDER — SODIUM CHLORIDE 9 MG/ML
50 INJECTION, SOLUTION INTRAVENOUS CONTINUOUS
Status: COMPLETED | OUTPATIENT
Start: 2019-03-08 | End: 2019-03-09

## 2019-03-08 RX ADMIN — HEPARIN SODIUM 5000 UNITS: 5000 INJECTION, SOLUTION INTRAVENOUS; SUBCUTANEOUS at 06:14

## 2019-03-08 RX ADMIN — FERROUS SULFATE TAB 325 MG (65 MG ELEMENTAL FE) 325 MG: 325 (65 FE) TAB at 17:08

## 2019-03-08 RX ADMIN — SERTRALINE HYDROCHLORIDE 50 MG: 50 TABLET ORAL at 10:15

## 2019-03-08 RX ADMIN — SODIUM CHLORIDE 50 ML/HR: 0.9 INJECTION, SOLUTION INTRAVENOUS at 18:00

## 2019-03-08 RX ADMIN — HEPARIN SODIUM 5000 UNITS: 5000 INJECTION, SOLUTION INTRAVENOUS; SUBCUTANEOUS at 14:27

## 2019-03-08 RX ADMIN — OXYBUTYNIN CHLORIDE 5 MG: 5 TABLET, EXTENDED RELEASE ORAL at 10:15

## 2019-03-08 RX ADMIN — LEVOFLOXACIN 750 MG: 5 INJECTION, SOLUTION INTRAVENOUS at 10:53

## 2019-03-08 RX ADMIN — LEVOTHYROXINE SODIUM 100 MCG: 100 TABLET ORAL at 06:14

## 2019-03-08 RX ADMIN — SENNOSIDES 8.6 MG: 8.6 TABLET, FILM COATED ORAL at 21:11

## 2019-03-08 RX ADMIN — FERROUS SULFATE TAB 325 MG (65 MG ELEMENTAL FE) 325 MG: 325 (65 FE) TAB at 10:15

## 2019-03-08 RX ADMIN — HEPARIN SODIUM 5000 UNITS: 5000 INJECTION, SOLUTION INTRAVENOUS; SUBCUTANEOUS at 21:11

## 2019-03-09 LAB
ANION GAP SERPL CALCULATED.3IONS-SCNC: 5 MMOL/L (ref 4–13)
BASOPHILS # BLD AUTO: 0.05 THOUSANDS/ΜL (ref 0–0.1)
BASOPHILS NFR BLD AUTO: 1 % (ref 0–1)
BUN SERPL-MCNC: 15 MG/DL (ref 5–25)
CALCIUM SERPL-MCNC: 7.9 MG/DL (ref 8.3–10.1)
CHLORIDE SERPL-SCNC: 109 MMOL/L (ref 100–108)
CO2 SERPL-SCNC: 31 MMOL/L (ref 21–32)
CREAT SERPL-MCNC: 0.98 MG/DL (ref 0.6–1.3)
EOSINOPHIL # BLD AUTO: 0.37 THOUSAND/ΜL (ref 0–0.61)
EOSINOPHIL NFR BLD AUTO: 5 % (ref 0–6)
ERYTHROCYTE [DISTWIDTH] IN BLOOD BY AUTOMATED COUNT: 14.7 % (ref 11.6–15.1)
GFR SERPL CREATININE-BSD FRML MDRD: 49 ML/MIN/1.73SQ M
GLUCOSE SERPL-MCNC: 68 MG/DL (ref 65–140)
HCT VFR BLD AUTO: 27 % (ref 34.8–46.1)
HGB BLD-MCNC: 7.8 G/DL (ref 11.5–15.4)
IMM GRANULOCYTES # BLD AUTO: 0.05 THOUSAND/UL (ref 0–0.2)
IMM GRANULOCYTES NFR BLD AUTO: 1 % (ref 0–2)
LYMPHOCYTES # BLD AUTO: 1.68 THOUSANDS/ΜL (ref 0.6–4.47)
LYMPHOCYTES NFR BLD AUTO: 22 % (ref 14–44)
MCH RBC QN AUTO: 30.1 PG (ref 26.8–34.3)
MCHC RBC AUTO-ENTMCNC: 28.9 G/DL (ref 31.4–37.4)
MCV RBC AUTO: 104 FL (ref 82–98)
MONOCYTES # BLD AUTO: 0.72 THOUSAND/ΜL (ref 0.17–1.22)
MONOCYTES NFR BLD AUTO: 10 % (ref 4–12)
NEUTROPHILS # BLD AUTO: 4.71 THOUSANDS/ΜL (ref 1.85–7.62)
NEUTS SEG NFR BLD AUTO: 61 % (ref 43–75)
NRBC BLD AUTO-RTO: 0 /100 WBCS
PLATELET # BLD AUTO: 584 THOUSANDS/UL (ref 149–390)
PMV BLD AUTO: 8.8 FL (ref 8.9–12.7)
POTASSIUM SERPL-SCNC: 3.6 MMOL/L (ref 3.5–5.3)
RBC # BLD AUTO: 2.59 MILLION/UL (ref 3.81–5.12)
SODIUM SERPL-SCNC: 145 MMOL/L (ref 136–145)
WBC # BLD AUTO: 7.58 THOUSAND/UL (ref 4.31–10.16)

## 2019-03-09 PROCEDURE — 80048 BASIC METABOLIC PNL TOTAL CA: CPT | Performed by: FAMILY MEDICINE

## 2019-03-09 PROCEDURE — 99232 SBSQ HOSP IP/OBS MODERATE 35: CPT | Performed by: FAMILY MEDICINE

## 2019-03-09 PROCEDURE — 85025 COMPLETE CBC W/AUTO DIFF WBC: CPT | Performed by: FAMILY MEDICINE

## 2019-03-09 RX ADMIN — SERTRALINE HYDROCHLORIDE 50 MG: 50 TABLET ORAL at 09:57

## 2019-03-09 RX ADMIN — HEPARIN SODIUM 5000 UNITS: 5000 INJECTION, SOLUTION INTRAVENOUS; SUBCUTANEOUS at 05:06

## 2019-03-09 RX ADMIN — HEPARIN SODIUM 5000 UNITS: 5000 INJECTION, SOLUTION INTRAVENOUS; SUBCUTANEOUS at 17:30

## 2019-03-09 RX ADMIN — BISACODYL 5 MG: 5 TABLET, COATED ORAL at 09:59

## 2019-03-09 RX ADMIN — OXYBUTYNIN CHLORIDE 5 MG: 5 TABLET, EXTENDED RELEASE ORAL at 09:57

## 2019-03-09 RX ADMIN — LEVOTHYROXINE SODIUM 100 MCG: 100 TABLET ORAL at 05:06

## 2019-03-09 RX ADMIN — SENNOSIDES 8.6 MG: 8.6 TABLET, FILM COATED ORAL at 21:27

## 2019-03-09 RX ADMIN — FERROUS SULFATE TAB 325 MG (65 MG ELEMENTAL FE) 325 MG: 325 (65 FE) TAB at 09:57

## 2019-03-09 RX ADMIN — HEPARIN SODIUM 5000 UNITS: 5000 INJECTION, SOLUTION INTRAVENOUS; SUBCUTANEOUS at 21:27

## 2019-03-10 ENCOUNTER — APPOINTMENT (INPATIENT)
Dept: RADIOLOGY | Facility: HOSPITAL | Age: 84
DRG: 177 | End: 2019-03-10
Payer: COMMERCIAL

## 2019-03-10 LAB
ANION GAP SERPL CALCULATED.3IONS-SCNC: 5 MMOL/L (ref 4–13)
BACTERIA BLD CULT: NORMAL
BACTERIA BLD CULT: NORMAL
BASOPHILS # BLD AUTO: 0.04 THOUSANDS/ΜL (ref 0–0.1)
BASOPHILS NFR BLD AUTO: 1 % (ref 0–1)
BUN SERPL-MCNC: 15 MG/DL (ref 5–25)
CALCIUM SERPL-MCNC: 8 MG/DL (ref 8.3–10.1)
CHLORIDE SERPL-SCNC: 110 MMOL/L (ref 100–108)
CO2 SERPL-SCNC: 29 MMOL/L (ref 21–32)
CREAT SERPL-MCNC: 0.96 MG/DL (ref 0.6–1.3)
EOSINOPHIL # BLD AUTO: 0.32 THOUSAND/ΜL (ref 0–0.61)
EOSINOPHIL NFR BLD AUTO: 5 % (ref 0–6)
ERYTHROCYTE [DISTWIDTH] IN BLOOD BY AUTOMATED COUNT: 14.9 % (ref 11.6–15.1)
GFR SERPL CREATININE-BSD FRML MDRD: 50 ML/MIN/1.73SQ M
GLUCOSE SERPL-MCNC: 71 MG/DL (ref 65–140)
HCT VFR BLD AUTO: 28.1 % (ref 34.8–46.1)
HGB BLD-MCNC: 8.2 G/DL (ref 11.5–15.4)
IMM GRANULOCYTES # BLD AUTO: 0.05 THOUSAND/UL (ref 0–0.2)
IMM GRANULOCYTES NFR BLD AUTO: 1 % (ref 0–2)
LYMPHOCYTES # BLD AUTO: 1.38 THOUSANDS/ΜL (ref 0.6–4.47)
LYMPHOCYTES NFR BLD AUTO: 20 % (ref 14–44)
MCH RBC QN AUTO: 30.4 PG (ref 26.8–34.3)
MCHC RBC AUTO-ENTMCNC: 29.2 G/DL (ref 31.4–37.4)
MCV RBC AUTO: 104 FL (ref 82–98)
MONOCYTES # BLD AUTO: 0.7 THOUSAND/ΜL (ref 0.17–1.22)
MONOCYTES NFR BLD AUTO: 10 % (ref 4–12)
NEUTROPHILS # BLD AUTO: 4.43 THOUSANDS/ΜL (ref 1.85–7.62)
NEUTS SEG NFR BLD AUTO: 63 % (ref 43–75)
NRBC BLD AUTO-RTO: 0 /100 WBCS
PLATELET # BLD AUTO: 587 THOUSANDS/UL (ref 149–390)
PMV BLD AUTO: 8.7 FL (ref 8.9–12.7)
POTASSIUM SERPL-SCNC: 3.6 MMOL/L (ref 3.5–5.3)
RBC # BLD AUTO: 2.7 MILLION/UL (ref 3.81–5.12)
SODIUM SERPL-SCNC: 144 MMOL/L (ref 136–145)
WBC # BLD AUTO: 6.92 THOUSAND/UL (ref 4.31–10.16)

## 2019-03-10 PROCEDURE — 85025 COMPLETE CBC W/AUTO DIFF WBC: CPT | Performed by: FAMILY MEDICINE

## 2019-03-10 PROCEDURE — 80048 BASIC METABOLIC PNL TOTAL CA: CPT | Performed by: FAMILY MEDICINE

## 2019-03-10 PROCEDURE — 74018 RADEX ABDOMEN 1 VIEW: CPT

## 2019-03-10 RX ORDER — FUROSEMIDE 10 MG/ML
20 INJECTION INTRAMUSCULAR; INTRAVENOUS ONCE
Status: COMPLETED | OUTPATIENT
Start: 2019-03-10 | End: 2019-03-10

## 2019-03-10 RX ORDER — MINERAL OIL 100 G/100G
1 OIL RECTAL ONCE
Status: COMPLETED | OUTPATIENT
Start: 2019-03-10 | End: 2019-03-10

## 2019-03-10 RX ADMIN — HEPARIN SODIUM 5000 UNITS: 5000 INJECTION, SOLUTION INTRAVENOUS; SUBCUTANEOUS at 14:45

## 2019-03-10 RX ADMIN — GLYCERIN 1 SUPPOSITORY: 2.1 SUPPOSITORY RECTAL at 17:29

## 2019-03-10 RX ADMIN — MINERAL OIL 1 ENEMA: 100 ENEMA RECTAL at 14:19

## 2019-03-10 RX ADMIN — OXYBUTYNIN CHLORIDE 5 MG: 5 TABLET, EXTENDED RELEASE ORAL at 08:36

## 2019-03-10 RX ADMIN — BISACODYL 5 MG: 5 TABLET, COATED ORAL at 08:36

## 2019-03-10 RX ADMIN — FERROUS SULFATE TAB 325 MG (65 MG ELEMENTAL FE) 325 MG: 325 (65 FE) TAB at 17:29

## 2019-03-10 RX ADMIN — HEPARIN SODIUM 5000 UNITS: 5000 INJECTION, SOLUTION INTRAVENOUS; SUBCUTANEOUS at 06:26

## 2019-03-10 RX ADMIN — LEVOTHYROXINE SODIUM 100 MCG: 100 TABLET ORAL at 06:26

## 2019-03-10 RX ADMIN — FERROUS SULFATE TAB 325 MG (65 MG ELEMENTAL FE) 325 MG: 325 (65 FE) TAB at 08:36

## 2019-03-10 RX ADMIN — LEVOFLOXACIN 750 MG: 5 INJECTION, SOLUTION INTRAVENOUS at 12:38

## 2019-03-10 RX ADMIN — SENNOSIDES 8.6 MG: 8.6 TABLET, FILM COATED ORAL at 21:31

## 2019-03-10 RX ADMIN — SERTRALINE HYDROCHLORIDE 50 MG: 50 TABLET ORAL at 08:36

## 2019-03-10 RX ADMIN — FUROSEMIDE 20 MG: 10 INJECTION, SOLUTION INTRAMUSCULAR; INTRAVENOUS at 14:45

## 2019-03-10 RX ADMIN — HEPARIN SODIUM 5000 UNITS: 5000 INJECTION, SOLUTION INTRAVENOUS; SUBCUTANEOUS at 21:31

## 2019-03-11 VITALS
DIASTOLIC BLOOD PRESSURE: 53 MMHG | HEART RATE: 68 BPM | HEIGHT: 62 IN | WEIGHT: 150.57 LBS | OXYGEN SATURATION: 98 % | SYSTOLIC BLOOD PRESSURE: 108 MMHG | BODY MASS INDEX: 27.71 KG/M2 | TEMPERATURE: 97.8 F | RESPIRATION RATE: 18 BRPM

## 2019-03-11 LAB
ANION GAP SERPL CALCULATED.3IONS-SCNC: 6 MMOL/L (ref 4–13)
BASOPHILS # BLD AUTO: 0.05 THOUSANDS/ΜL (ref 0–0.1)
BASOPHILS NFR BLD AUTO: 1 % (ref 0–1)
BUN SERPL-MCNC: 14 MG/DL (ref 5–25)
CALCIUM SERPL-MCNC: 8.3 MG/DL (ref 8.3–10.1)
CHLORIDE SERPL-SCNC: 109 MMOL/L (ref 100–108)
CO2 SERPL-SCNC: 32 MMOL/L (ref 21–32)
CREAT SERPL-MCNC: 1.05 MG/DL (ref 0.6–1.3)
EOSINOPHIL # BLD AUTO: 0.23 THOUSAND/ΜL (ref 0–0.61)
EOSINOPHIL NFR BLD AUTO: 3 % (ref 0–6)
ERYTHROCYTE [DISTWIDTH] IN BLOOD BY AUTOMATED COUNT: 14.7 % (ref 11.6–15.1)
GFR SERPL CREATININE-BSD FRML MDRD: 45 ML/MIN/1.73SQ M
GLUCOSE SERPL-MCNC: 79 MG/DL (ref 65–140)
HCT VFR BLD AUTO: 28.4 % (ref 34.8–46.1)
HEMOCCULT STL QL: POSITIVE
HGB BLD-MCNC: 8.3 G/DL (ref 11.5–15.4)
IMM GRANULOCYTES # BLD AUTO: 0.05 THOUSAND/UL (ref 0–0.2)
IMM GRANULOCYTES NFR BLD AUTO: 1 % (ref 0–2)
LYMPHOCYTES # BLD AUTO: 1.21 THOUSANDS/ΜL (ref 0.6–4.47)
LYMPHOCYTES NFR BLD AUTO: 18 % (ref 14–44)
MCH RBC QN AUTO: 30.1 PG (ref 26.8–34.3)
MCHC RBC AUTO-ENTMCNC: 29.2 G/DL (ref 31.4–37.4)
MCV RBC AUTO: 103 FL (ref 82–98)
MONOCYTES # BLD AUTO: 0.85 THOUSAND/ΜL (ref 0.17–1.22)
MONOCYTES NFR BLD AUTO: 13 % (ref 4–12)
NEUTROPHILS # BLD AUTO: 4.3 THOUSANDS/ΜL (ref 1.85–7.62)
NEUTS SEG NFR BLD AUTO: 64 % (ref 43–75)
NRBC BLD AUTO-RTO: 0 /100 WBCS
PLATELET # BLD AUTO: 639 THOUSANDS/UL (ref 149–390)
PMV BLD AUTO: 8.9 FL (ref 8.9–12.7)
POTASSIUM SERPL-SCNC: 3.5 MMOL/L (ref 3.5–5.3)
RBC # BLD AUTO: 2.76 MILLION/UL (ref 3.81–5.12)
SODIUM SERPL-SCNC: 147 MMOL/L (ref 136–145)
WBC # BLD AUTO: 6.69 THOUSAND/UL (ref 4.31–10.16)

## 2019-03-11 PROCEDURE — 85025 COMPLETE CBC W/AUTO DIFF WBC: CPT | Performed by: FAMILY MEDICINE

## 2019-03-11 PROCEDURE — 97530 THERAPEUTIC ACTIVITIES: CPT

## 2019-03-11 PROCEDURE — 99239 HOSP IP/OBS DSCHRG MGMT >30: CPT | Performed by: FAMILY MEDICINE

## 2019-03-11 PROCEDURE — 80048 BASIC METABOLIC PNL TOTAL CA: CPT | Performed by: FAMILY MEDICINE

## 2019-03-11 RX ORDER — DEXTROSE MONOHYDRATE 50 MG/ML
50 INJECTION, SOLUTION INTRAVENOUS CONTINUOUS
Status: DISCONTINUED | OUTPATIENT
Start: 2019-03-11 | End: 2019-03-11 | Stop reason: HOSPADM

## 2019-03-11 RX ORDER — LEVOFLOXACIN 750 MG/1
750 TABLET ORAL EVERY OTHER DAY
Refills: 0
Start: 2019-03-11 | End: 2019-03-20 | Stop reason: HOSPADM

## 2019-03-11 RX ADMIN — LEVOTHYROXINE SODIUM 100 MCG: 100 TABLET ORAL at 05:57

## 2019-03-11 RX ADMIN — OXYBUTYNIN CHLORIDE 5 MG: 5 TABLET, EXTENDED RELEASE ORAL at 08:30

## 2019-03-11 RX ADMIN — SERTRALINE HYDROCHLORIDE 50 MG: 50 TABLET ORAL at 08:31

## 2019-03-11 RX ADMIN — FERROUS SULFATE TAB 325 MG (65 MG ELEMENTAL FE) 325 MG: 325 (65 FE) TAB at 08:30

## 2019-03-11 RX ADMIN — DEXTROSE MONOHYDRATE 50 ML/HR: 50 INJECTION, SOLUTION INTRAVENOUS at 11:20

## 2019-03-11 RX ADMIN — HEPARIN SODIUM 5000 UNITS: 5000 INJECTION, SOLUTION INTRAVENOUS; SUBCUTANEOUS at 13:37

## 2019-03-11 RX ADMIN — HEPARIN SODIUM 5000 UNITS: 5000 INJECTION, SOLUTION INTRAVENOUS; SUBCUTANEOUS at 05:57

## 2019-03-12 ENCOUNTER — HOSPITAL ENCOUNTER (INPATIENT)
Facility: HOSPITAL | Age: 84
LOS: 8 days | Discharge: NURSING, REHAB OR LONG-TERM CARE | DRG: 177 | End: 2019-03-20
Attending: EMERGENCY MEDICINE | Admitting: INTERNAL MEDICINE
Payer: COMMERCIAL

## 2019-03-12 ENCOUNTER — APPOINTMENT (EMERGENCY)
Dept: RADIOLOGY | Facility: HOSPITAL | Age: 84
DRG: 177 | End: 2019-03-12
Payer: COMMERCIAL

## 2019-03-12 ENCOUNTER — APPOINTMENT (INPATIENT)
Dept: CT IMAGING | Facility: HOSPITAL | Age: 84
DRG: 177 | End: 2019-03-12
Payer: COMMERCIAL

## 2019-03-12 DIAGNOSIS — J96.02 ACUTE RESPIRATORY FAILURE WITH HYPERCAPNIA (HCC): ICD-10-CM

## 2019-03-12 DIAGNOSIS — I47.2 WIDE-COMPLEX TACHYCARDIA (HCC): ICD-10-CM

## 2019-03-12 DIAGNOSIS — L89.613 PRESSURE INJURY OF RIGHT HEEL, STAGE 3 (HCC): ICD-10-CM

## 2019-03-12 DIAGNOSIS — D72.829 LEUKOCYTOSIS: ICD-10-CM

## 2019-03-12 DIAGNOSIS — J18.9 HEALTHCARE-ASSOCIATED PNEUMONIA: ICD-10-CM

## 2019-03-12 DIAGNOSIS — E87.2 LACTIC ACIDOSIS: ICD-10-CM

## 2019-03-12 DIAGNOSIS — J90 PLEURAL EFFUSION: Primary | ICD-10-CM

## 2019-03-12 DIAGNOSIS — I50.9 CHF (CONGESTIVE HEART FAILURE) (HCC): ICD-10-CM

## 2019-03-12 DIAGNOSIS — N28.9 RENAL INSUFFICIENCY: ICD-10-CM

## 2019-03-12 DIAGNOSIS — N17.9 ACUTE KIDNEY INJURY (HCC): ICD-10-CM

## 2019-03-12 PROBLEM — A41.9 SEPSIS (HCC): Status: ACTIVE | Noted: 2019-03-12

## 2019-03-12 LAB
ALBUMIN SERPL BCP-MCNC: 1.9 G/DL (ref 3.5–5)
ALP SERPL-CCNC: 83 U/L (ref 46–116)
ALT SERPL W P-5'-P-CCNC: 14 U/L (ref 12–78)
ANION GAP SERPL CALCULATED.3IONS-SCNC: 5 MMOL/L (ref 4–13)
ANISOCYTOSIS BLD QL SMEAR: PRESENT
APTT PPP: 30 SECONDS (ref 26–38)
AST SERPL W P-5'-P-CCNC: 16 U/L (ref 5–45)
BASE EXCESS BLDA CALC-SCNC: -0.3 MMOL/L
BASOPHILS # BLD MANUAL: 0 THOUSAND/UL (ref 0–0.1)
BASOPHILS NFR MAR MANUAL: 0 % (ref 0–1)
BILIRUB SERPL-MCNC: 0.2 MG/DL (ref 0.2–1)
BUN SERPL-MCNC: 18 MG/DL (ref 5–25)
CALCIUM SERPL-MCNC: 8.7 MG/DL (ref 8.3–10.1)
CHLORIDE SERPL-SCNC: 105 MMOL/L (ref 100–108)
CO2 SERPL-SCNC: 34 MMOL/L (ref 21–32)
CREAT SERPL-MCNC: 1.64 MG/DL (ref 0.6–1.3)
EOSINOPHIL # BLD MANUAL: 0.16 THOUSAND/UL (ref 0–0.4)
EOSINOPHIL NFR BLD MANUAL: 1 % (ref 0–6)
ERYTHROCYTE [DISTWIDTH] IN BLOOD BY AUTOMATED COUNT: 15 % (ref 11.6–15.1)
GFR SERPL CREATININE-BSD FRML MDRD: 26 ML/MIN/1.73SQ M
GLUCOSE SERPL-MCNC: 114 MG/DL (ref 65–140)
HCO3 BLDA-SCNC: 28.3 MMOL/L (ref 22–28)
HCT VFR BLD AUTO: 36.5 % (ref 34.8–46.1)
HGB BLD-MCNC: 10.7 G/DL (ref 11.5–15.4)
INR PPP: 1.09 (ref 0.86–1.17)
IPAP: 14
LACTATE SERPL-SCNC: 2.4 MMOL/L (ref 0.5–2)
LACTATE SERPL-SCNC: 3 MMOL/L (ref 0.5–2)
LYMPHOCYTES # BLD AUTO: 0.65 THOUSAND/UL (ref 0.6–4.47)
LYMPHOCYTES # BLD AUTO: 4 % (ref 14–44)
MAGNESIUM SERPL-MCNC: 1.7 MG/DL (ref 1.6–2.6)
MCH RBC QN AUTO: 30.6 PG (ref 26.8–34.3)
MCHC RBC AUTO-ENTMCNC: 29.3 G/DL (ref 31.4–37.4)
MCV RBC AUTO: 104 FL (ref 82–98)
MONOCYTES # BLD AUTO: 0.65 THOUSAND/UL (ref 0–1.22)
MONOCYTES NFR BLD: 4 % (ref 4–12)
NEUTROPHILS # BLD MANUAL: 14.83 THOUSAND/UL (ref 1.85–7.62)
NEUTS BAND NFR BLD MANUAL: 15 % (ref 0–8)
NEUTS SEG NFR BLD AUTO: 76 % (ref 43–75)
NON VENT- BIPAP: ABNORMAL
NRBC BLD AUTO-RTO: 0 /100 WBCS
O2 CT BLDA-SCNC: 14.3 ML/DL (ref 16–23)
OXYHGB MFR BLDA: 89.9 % (ref 94–97)
PCO2 BLDA: 67.6 MM HG (ref 36–44)
PEEP MAX SETTING VENT: 6 CM[H2O]
PH BLDA: 7.24 [PH] (ref 7.35–7.45)
PLATELET # BLD AUTO: 493 THOUSANDS/UL (ref 149–390)
PLATELET # BLD AUTO: 768 THOUSANDS/UL (ref 149–390)
PLATELET BLD QL SMEAR: ABNORMAL
PMV BLD AUTO: 8.8 FL (ref 8.9–12.7)
PMV BLD AUTO: 9.1 FL (ref 8.9–12.7)
PO2 BLDA: 65.9 MM HG (ref 75–129)
POTASSIUM SERPL-SCNC: 4.4 MMOL/L (ref 3.5–5.3)
PROT SERPL-MCNC: 6.3 G/DL (ref 6.4–8.2)
PROTHROMBIN TIME: 13.6 SECONDS (ref 11.8–14.2)
RBC # BLD AUTO: 3.5 MILLION/UL (ref 3.81–5.12)
SODIUM SERPL-SCNC: 144 MMOL/L (ref 136–145)
SPECIMEN SOURCE: ABNORMAL
TOTAL CELLS COUNTED SPEC: 100
TROPONIN I SERPL-MCNC: 0.02 NG/ML
VENT BIPAP FIO2: 60 %
WBC # BLD AUTO: 16.3 THOUSAND/UL (ref 4.31–10.16)

## 2019-03-12 PROCEDURE — 94640 AIRWAY INHALATION TREATMENT: CPT

## 2019-03-12 PROCEDURE — 99223 1ST HOSP IP/OBS HIGH 75: CPT | Performed by: NURSE PRACTITIONER

## 2019-03-12 PROCEDURE — 87081 CULTURE SCREEN ONLY: CPT | Performed by: NURSE PRACTITIONER

## 2019-03-12 PROCEDURE — 85007 BL SMEAR W/DIFF WBC COUNT: CPT | Performed by: EMERGENCY MEDICINE

## 2019-03-12 PROCEDURE — 85049 AUTOMATED PLATELET COUNT: CPT | Performed by: NURSE PRACTITIONER

## 2019-03-12 PROCEDURE — 94760 N-INVAS EAR/PLS OXIMETRY 1: CPT

## 2019-03-12 PROCEDURE — 82805 BLOOD GASES W/O2 SATURATION: CPT | Performed by: NURSE PRACTITIONER

## 2019-03-12 PROCEDURE — 80053 COMPREHEN METABOLIC PANEL: CPT | Performed by: EMERGENCY MEDICINE

## 2019-03-12 PROCEDURE — C9113 INJ PANTOPRAZOLE SODIUM, VIA: HCPCS | Performed by: EMERGENCY MEDICINE

## 2019-03-12 PROCEDURE — 83605 ASSAY OF LACTIC ACID: CPT | Performed by: EMERGENCY MEDICINE

## 2019-03-12 PROCEDURE — 96375 TX/PRO/DX INJ NEW DRUG ADDON: CPT

## 2019-03-12 PROCEDURE — 71045 X-RAY EXAM CHEST 1 VIEW: CPT

## 2019-03-12 PROCEDURE — 85610 PROTHROMBIN TIME: CPT | Performed by: EMERGENCY MEDICINE

## 2019-03-12 PROCEDURE — 99285 EMERGENCY DEPT VISIT HI MDM: CPT

## 2019-03-12 PROCEDURE — 71250 CT THORAX DX C-: CPT

## 2019-03-12 PROCEDURE — 87040 BLOOD CULTURE FOR BACTERIA: CPT | Performed by: EMERGENCY MEDICINE

## 2019-03-12 PROCEDURE — 83605 ASSAY OF LACTIC ACID: CPT | Performed by: NURSE PRACTITIONER

## 2019-03-12 PROCEDURE — 85730 THROMBOPLASTIN TIME PARTIAL: CPT | Performed by: EMERGENCY MEDICINE

## 2019-03-12 PROCEDURE — 85027 COMPLETE CBC AUTOMATED: CPT | Performed by: EMERGENCY MEDICINE

## 2019-03-12 PROCEDURE — 83735 ASSAY OF MAGNESIUM: CPT | Performed by: EMERGENCY MEDICINE

## 2019-03-12 PROCEDURE — 84484 ASSAY OF TROPONIN QUANT: CPT | Performed by: EMERGENCY MEDICINE

## 2019-03-12 PROCEDURE — 96365 THER/PROPH/DIAG IV INF INIT: CPT

## 2019-03-12 PROCEDURE — 36600 WITHDRAWAL OF ARTERIAL BLOOD: CPT

## 2019-03-12 PROCEDURE — 36415 COLL VENOUS BLD VENIPUNCTURE: CPT

## 2019-03-12 PROCEDURE — 93005 ELECTROCARDIOGRAM TRACING: CPT

## 2019-03-12 PROCEDURE — 84145 PROCALCITONIN (PCT): CPT | Performed by: NURSE PRACTITIONER

## 2019-03-12 RX ORDER — LEVALBUTEROL 1.25 MG/.5ML
1.25 SOLUTION, CONCENTRATE RESPIRATORY (INHALATION) ONCE
Status: COMPLETED | OUTPATIENT
Start: 2019-03-12 | End: 2019-03-12

## 2019-03-12 RX ORDER — CEFEPIME HYDROCHLORIDE 2 G/50ML
2000 INJECTION, SOLUTION INTRAVENOUS EVERY 24 HOURS
Status: DISCONTINUED | OUTPATIENT
Start: 2019-03-13 | End: 2019-03-13

## 2019-03-12 RX ORDER — ALBUTEROL SULFATE 2.5 MG/3ML
5 SOLUTION RESPIRATORY (INHALATION) ONCE
Status: DISCONTINUED | OUTPATIENT
Start: 2019-03-12 | End: 2019-03-12

## 2019-03-12 RX ORDER — VANCOMYCIN HYDROCHLORIDE 1 G/20ML
INJECTION, POWDER, LYOPHILIZED, FOR SOLUTION INTRAVENOUS
Status: COMPLETED
Start: 2019-03-12 | End: 2019-03-12

## 2019-03-12 RX ORDER — HEPARIN SODIUM 5000 [USP'U]/ML
5000 INJECTION, SOLUTION INTRAVENOUS; SUBCUTANEOUS EVERY 8 HOURS SCHEDULED
Status: DISCONTINUED | OUTPATIENT
Start: 2019-03-12 | End: 2019-03-12

## 2019-03-12 RX ORDER — ONDANSETRON 2 MG/ML
4 INJECTION INTRAMUSCULAR; INTRAVENOUS ONCE
Status: COMPLETED | OUTPATIENT
Start: 2019-03-12 | End: 2019-03-12

## 2019-03-12 RX ORDER — ALBUTEROL SULFATE 2.5 MG/3ML
1.25 SOLUTION RESPIRATORY (INHALATION) EVERY 6 HOURS PRN
Status: DISCONTINUED | OUTPATIENT
Start: 2019-03-12 | End: 2019-03-14

## 2019-03-12 RX ORDER — OXYBUTYNIN CHLORIDE 5 MG/1
5 TABLET, EXTENDED RELEASE ORAL DAILY
Status: DISCONTINUED | OUTPATIENT
Start: 2019-03-13 | End: 2019-03-13

## 2019-03-12 RX ORDER — NYSTATIN 100000 [USP'U]/G
1 POWDER TOPICAL 2 TIMES DAILY
COMMUNITY

## 2019-03-12 RX ORDER — LEVOTHYROXINE SODIUM 0.1 MG/1
100 TABLET ORAL
Status: DISCONTINUED | OUTPATIENT
Start: 2019-03-13 | End: 2019-03-20 | Stop reason: HOSPADM

## 2019-03-12 RX ORDER — ALBUTEROL SULFATE 2.5 MG/3ML
2 SOLUTION RESPIRATORY (INHALATION) ONCE
Status: DISCONTINUED | OUTPATIENT
Start: 2019-03-12 | End: 2019-03-12

## 2019-03-12 RX ORDER — VANCOMYCIN HYDROCHLORIDE 1 G/200ML
15 INJECTION, SOLUTION INTRAVENOUS ONCE
Status: COMPLETED | OUTPATIENT
Start: 2019-03-12 | End: 2019-03-12

## 2019-03-12 RX ORDER — CEFEPIME HYDROCHLORIDE 2 G/50ML
2000 INJECTION, SOLUTION INTRAVENOUS ONCE
Status: COMPLETED | OUTPATIENT
Start: 2019-03-12 | End: 2019-03-12

## 2019-03-12 RX ORDER — ALBUTEROL SULFATE 1.25 MG/3ML
1 SOLUTION RESPIRATORY (INHALATION) EVERY 6 HOURS PRN
COMMUNITY

## 2019-03-12 RX ORDER — ACETAMINOPHEN 325 MG/1
650 TABLET ORAL EVERY 6 HOURS PRN
Status: DISCONTINUED | OUTPATIENT
Start: 2019-03-12 | End: 2019-03-13

## 2019-03-12 RX ORDER — VANCOMYCIN HYDROCHLORIDE 1 G/200ML
15 INJECTION, SOLUTION INTRAVENOUS EVERY 12 HOURS
Status: DISCONTINUED | OUTPATIENT
Start: 2019-03-13 | End: 2019-03-12

## 2019-03-12 RX ADMIN — AZITHROMYCIN MONOHYDRATE 500 MG: 500 INJECTION, POWDER, LYOPHILIZED, FOR SOLUTION INTRAVENOUS at 22:25

## 2019-03-12 RX ADMIN — IPRATROPIUM BROMIDE 0.5 MG: 0.5 SOLUTION RESPIRATORY (INHALATION) at 19:25

## 2019-03-12 RX ADMIN — SODIUM CHLORIDE 1000 ML: 0.9 INJECTION, SOLUTION INTRAVENOUS at 18:59

## 2019-03-12 RX ADMIN — VANCOMYCIN HYDROCHLORIDE 1000 MG: 1 INJECTION, SOLUTION INTRAVENOUS at 20:09

## 2019-03-12 RX ADMIN — Medication 8 MG/HR: at 19:15

## 2019-03-12 RX ADMIN — LEVALBUTEROL 1.25 MG: 1.25 SOLUTION, CONCENTRATE RESPIRATORY (INHALATION) at 19:25

## 2019-03-12 RX ADMIN — ONDANSETRON 4 MG: 2 INJECTION INTRAMUSCULAR; INTRAVENOUS at 19:11

## 2019-03-12 RX ADMIN — CEFEPIME HYDROCHLORIDE 2000 MG: 2 INJECTION, SOLUTION INTRAVENOUS at 19:18

## 2019-03-12 RX ADMIN — Medication 80 MG: at 19:38

## 2019-03-12 RX ADMIN — NOREPINEPHRINE BITARTRATE 5 MCG/MIN: 1 INJECTION INTRAVENOUS at 23:11

## 2019-03-13 ENCOUNTER — APPOINTMENT (INPATIENT)
Dept: INTERVENTIONAL RADIOLOGY/VASCULAR | Facility: HOSPITAL | Age: 84
DRG: 177 | End: 2019-03-13
Attending: INTERNAL MEDICINE
Payer: COMMERCIAL

## 2019-03-13 ENCOUNTER — APPOINTMENT (INPATIENT)
Dept: RADIOLOGY | Facility: HOSPITAL | Age: 84
DRG: 177 | End: 2019-03-13
Payer: COMMERCIAL

## 2019-03-13 PROBLEM — I47.2 WIDE-COMPLEX TACHYCARDIA (HCC): Status: ACTIVE | Noted: 2019-03-13

## 2019-03-13 PROBLEM — B37.2 CANDIDIASIS OF SKIN: Status: ACTIVE | Noted: 2019-03-13

## 2019-03-13 PROBLEM — J96.02 ACUTE RESPIRATORY FAILURE WITH HYPERCAPNIA (HCC): Status: ACTIVE | Noted: 2019-03-05

## 2019-03-13 PROBLEM — I95.9 HYPOTENSION: Status: ACTIVE | Noted: 2019-03-13

## 2019-03-13 PROBLEM — R65.21 SEVERE SEPSIS WITH SEPTIC SHOCK (HCC): Status: ACTIVE | Noted: 2019-03-12

## 2019-03-13 PROBLEM — J90 BILATERAL PLEURAL EFFUSION: Status: ACTIVE | Noted: 2019-03-13

## 2019-03-13 PROBLEM — K92.2 GASTROINTESTINAL HEMORRHAGE: Status: ACTIVE | Noted: 2019-03-13

## 2019-03-13 PROBLEM — C50.919 BREAST CANCER (HCC): Status: ACTIVE | Noted: 2019-03-13

## 2019-03-13 LAB
ANION GAP SERPL CALCULATED.3IONS-SCNC: 8 MMOL/L (ref 4–13)
ARTERIAL PATENCY WRIST A: YES
ATRIAL RATE: 122 BPM
ATRIAL RATE: 129 BPM
ATRIAL RATE: 64 BPM
ATRIAL RATE: 71 BPM
BASE EXCESS BLDA CALC-SCNC: -3 MMOL/L
BASE EXCESS BLDA CALC-SCNC: 0 MMOL/L (ref -2–3)
BASE EXCESS BLDA CALC-SCNC: 1.6 MMOL/L
BUN SERPL-MCNC: 22 MG/DL (ref 5–25)
CA-I BLD-SCNC: 1.11 MMOL/L (ref 1.12–1.32)
CALCIUM SERPL-MCNC: 7.8 MG/DL (ref 8.3–10.1)
CHLORIDE SERPL-SCNC: 107 MMOL/L (ref 100–108)
CO2 SERPL-SCNC: 27 MMOL/L (ref 21–32)
CREAT SERPL-MCNC: 1.98 MG/DL (ref 0.6–1.3)
ERYTHROCYTE [DISTWIDTH] IN BLOOD BY AUTOMATED COUNT: 15.1 % (ref 11.6–15.1)
FIO2 GAS DIL.REBREATH: 50 L
FLUAV AG SPEC QL: NOT DETECTED
FLUBV AG SPEC QL: NOT DETECTED
GFR SERPL CREATININE-BSD FRML MDRD: 21 ML/MIN/1.73SQ M
GLUCOSE SERPL-MCNC: 115 MG/DL (ref 65–140)
GLUCOSE SERPL-MCNC: 119 MG/DL (ref 65–140)
GLUCOSE SERPL-MCNC: 127 MG/DL (ref 65–140)
GLUCOSE SERPL-MCNC: 130 MG/DL (ref 65–140)
GLUCOSE SERPL-MCNC: 181 MG/DL (ref 65–140)
HCO3 BLDA-SCNC: 24.6 MMOL/L (ref 22–28)
HCO3 BLDA-SCNC: 26.5 MMOL/L (ref 22–28)
HCO3 BLDA-SCNC: 29.4 MMOL/L (ref 22–28)
HCT VFR BLD AUTO: 31.6 % (ref 34.8–46.1)
HCT VFR BLD CALC: 27 % (ref 34.8–46.1)
HFNC FLOW LPM: 20
HGB BLD-MCNC: 9.1 G/DL (ref 11.5–15.4)
HGB BLDA-MCNC: 9.2 G/DL (ref 11.5–15.4)
IPAP: 18
L PNEUMO1 AG UR QL IA.RAPID: NEGATIVE
LACTATE SERPL-SCNC: 1.9 MMOL/L (ref 0.5–2)
MAGNESIUM SERPL-MCNC: 2 MG/DL (ref 1.6–2.6)
MCH RBC QN AUTO: 30.2 PG (ref 26.8–34.3)
MCHC RBC AUTO-ENTMCNC: 28.8 G/DL (ref 31.4–37.4)
MCV RBC AUTO: 105 FL (ref 82–98)
NON VENT HFNC FIO2: 100
NON VENT TYPE HFNC: ABNORMAL
NON VENT- BIPAP: ABNORMAL
NT-PROBNP SERPL-MCNC: 820 PG/ML
O2 CT BLDA-SCNC: 13.4 ML/DL (ref 16–23)
O2 CT BLDA-SCNC: 14.4 ML/DL (ref 16–23)
OXYHGB MFR BLDA: 94.4 % (ref 94–97)
OXYHGB MFR BLDA: 95.3 % (ref 94–97)
P AXIS: 30 DEGREES
PCO2 BLD: 28 MMOL/L (ref 21–32)
PCO2 BLD: 54.2 MM HG (ref 36–44)
PCO2 BLDA: 57.1 MM HG (ref 36–44)
PCO2 BLDA: 65.4 MM HG (ref 36–44)
PEEP MAX SETTING VENT: 6 CM[H2O]
PH BLD: 7.3 [PH] (ref 7.35–7.45)
PH BLDA: 7.25 [PH] (ref 7.35–7.45)
PH BLDA: 7.27 [PH] (ref 7.35–7.45)
PHOSPHATE SERPL-MCNC: 4.1 MG/DL (ref 2.3–4.1)
PLATELET # BLD AUTO: 667 THOUSANDS/UL (ref 149–390)
PMV BLD AUTO: 9.4 FL (ref 8.9–12.7)
PO2 BLD: 91 MM HG (ref 75–129)
PO2 BLDA: 83.6 MM HG (ref 75–129)
PO2 BLDA: 86.4 MM HG (ref 75–129)
POTASSIUM BLD-SCNC: 4.8 MMOL/L (ref 3.5–5.3)
POTASSIUM SERPL-SCNC: 4.4 MMOL/L (ref 3.5–5.3)
PR INTERVAL: 112 MS
PROCALCITONIN SERPL-MCNC: 0.43 NG/ML
PROCALCITONIN SERPL-MCNC: 1.39 NG/ML
QRS AXIS: 74 DEGREES
QRS AXIS: 83 DEGREES
QRS AXIS: 89 DEGREES
QRS AXIS: 91 DEGREES
QRSD INTERVAL: 134 MS
QRSD INTERVAL: 150 MS
QRSD INTERVAL: 158 MS
QRSD INTERVAL: 160 MS
QT INTERVAL: 334 MS
QT INTERVAL: 372 MS
QT INTERVAL: 376 MS
QT INTERVAL: 396 MS
QTC INTERVAL: 489 MS
QTC INTERVAL: 549 MS
QTC INTERVAL: 553 MS
QTC INTERVAL: 562 MS
RBC # BLD AUTO: 3.01 MILLION/UL (ref 3.81–5.12)
RSV B RNA SPEC QL NAA+PROBE: NOT DETECTED
S PNEUM AG UR QL: NEGATIVE
SAO2 % BLD FROM PO2: 96 % (ref 95–98)
SODIUM BLD-SCNC: 143 MMOL/L (ref 136–145)
SODIUM SERPL-SCNC: 142 MMOL/L (ref 136–145)
SPECIMEN SOURCE: ABNORMAL
T WAVE AXIS: -87 DEGREES
T WAVE AXIS: 266 DEGREES
T WAVE AXIS: 270 DEGREES
T WAVE AXIS: 7 DEGREES
VENT BIPAP FIO2: ABNORMAL %
VENTRICULAR RATE: 121 BPM
VENTRICULAR RATE: 129 BPM
VENTRICULAR RATE: 130 BPM
VENTRICULAR RATE: 131 BPM
WBC # BLD AUTO: 25.3 THOUSAND/UL (ref 4.31–10.16)

## 2019-03-13 PROCEDURE — 84295 ASSAY OF SERUM SODIUM: CPT

## 2019-03-13 PROCEDURE — 87631 RESP VIRUS 3-5 TARGETS: CPT | Performed by: INTERNAL MEDICINE

## 2019-03-13 PROCEDURE — 93010 ELECTROCARDIOGRAM REPORT: CPT | Performed by: INTERNAL MEDICINE

## 2019-03-13 PROCEDURE — 87205 SMEAR GRAM STAIN: CPT | Performed by: NURSE PRACTITIONER

## 2019-03-13 PROCEDURE — 84145 PROCALCITONIN (PCT): CPT | Performed by: NURSE PRACTITIONER

## 2019-03-13 PROCEDURE — 99291 CRITICAL CARE FIRST HOUR: CPT | Performed by: INTERNAL MEDICINE

## 2019-03-13 PROCEDURE — 82805 BLOOD GASES W/O2 SATURATION: CPT | Performed by: NURSE PRACTITIONER

## 2019-03-13 PROCEDURE — 83880 ASSAY OF NATRIURETIC PEPTIDE: CPT | Performed by: NURSE PRACTITIONER

## 2019-03-13 PROCEDURE — C1751 CATH, INF, PER/CENT/MIDLINE: HCPCS

## 2019-03-13 PROCEDURE — 85027 COMPLETE CBC AUTOMATED: CPT | Performed by: NURSE PRACTITIONER

## 2019-03-13 PROCEDURE — 84100 ASSAY OF PHOSPHORUS: CPT | Performed by: NURSE PRACTITIONER

## 2019-03-13 PROCEDURE — 82947 ASSAY GLUCOSE BLOOD QUANT: CPT

## 2019-03-13 PROCEDURE — 02HV33Z INSERTION OF INFUSION DEVICE INTO SUPERIOR VENA CAVA, PERCUTANEOUS APPROACH: ICD-10-PCS | Performed by: RADIOLOGY

## 2019-03-13 PROCEDURE — 82803 BLOOD GASES ANY COMBINATION: CPT

## 2019-03-13 PROCEDURE — 94760 N-INVAS EAR/PLS OXIMETRY 1: CPT

## 2019-03-13 PROCEDURE — 85014 HEMATOCRIT: CPT

## 2019-03-13 PROCEDURE — 36556 INSERT NON-TUNNEL CV CATH: CPT

## 2019-03-13 PROCEDURE — 93005 ELECTROCARDIOGRAM TRACING: CPT

## 2019-03-13 PROCEDURE — 83735 ASSAY OF MAGNESIUM: CPT | Performed by: NURSE PRACTITIONER

## 2019-03-13 PROCEDURE — 99222 1ST HOSP IP/OBS MODERATE 55: CPT | Performed by: INTERNAL MEDICINE

## 2019-03-13 PROCEDURE — 87449 NOS EACH ORGANISM AG IA: CPT | Performed by: NURSE PRACTITIONER

## 2019-03-13 PROCEDURE — C9113 INJ PANTOPRAZOLE SODIUM, VIA: HCPCS | Performed by: INTERNAL MEDICINE

## 2019-03-13 PROCEDURE — 76937 US GUIDE VASCULAR ACCESS: CPT

## 2019-03-13 PROCEDURE — 71045 X-RAY EXAM CHEST 1 VIEW: CPT

## 2019-03-13 PROCEDURE — 83605 ASSAY OF LACTIC ACID: CPT | Performed by: NURSE PRACTITIONER

## 2019-03-13 PROCEDURE — 94660 CPAP INITIATION&MGMT: CPT

## 2019-03-13 PROCEDURE — 84132 ASSAY OF SERUM POTASSIUM: CPT

## 2019-03-13 PROCEDURE — 36600 WITHDRAWAL OF ARTERIAL BLOOD: CPT

## 2019-03-13 PROCEDURE — 82948 REAGENT STRIP/BLOOD GLUCOSE: CPT

## 2019-03-13 PROCEDURE — 80048 BASIC METABOLIC PNL TOTAL CA: CPT | Performed by: NURSE PRACTITIONER

## 2019-03-13 PROCEDURE — 82330 ASSAY OF CALCIUM: CPT

## 2019-03-13 PROCEDURE — 36556 INSERT NON-TUNNEL CV CATH: CPT | Performed by: RADIOLOGY

## 2019-03-13 PROCEDURE — 76937 US GUIDE VASCULAR ACCESS: CPT | Performed by: RADIOLOGY

## 2019-03-13 RX ORDER — NYSTATIN 100000 [USP'U]/G
POWDER TOPICAL 2 TIMES DAILY
Status: DISCONTINUED | OUTPATIENT
Start: 2019-03-13 | End: 2019-03-20 | Stop reason: HOSPADM

## 2019-03-13 RX ORDER — ONDANSETRON 2 MG/ML
4 INJECTION INTRAMUSCULAR; INTRAVENOUS EVERY 4 HOURS PRN
Status: DISCONTINUED | OUTPATIENT
Start: 2019-03-13 | End: 2019-03-19

## 2019-03-13 RX ORDER — ACETAMINOPHEN 650 MG/1
650 SUPPOSITORY RECTAL EVERY 6 HOURS PRN
Status: DISCONTINUED | OUTPATIENT
Start: 2019-03-13 | End: 2019-03-20 | Stop reason: HOSPADM

## 2019-03-13 RX ORDER — LIDOCAINE HYDROCHLORIDE 10 MG/ML
10 INJECTION, SOLUTION INFILTRATION; PERINEURAL ONCE
Status: COMPLETED | OUTPATIENT
Start: 2019-03-13 | End: 2019-03-13

## 2019-03-13 RX ORDER — CEFEPIME HYDROCHLORIDE 1 G/50ML
1000 INJECTION, SOLUTION INTRAVENOUS EVERY 24 HOURS
Status: DISCONTINUED | OUTPATIENT
Start: 2019-03-13 | End: 2019-03-14

## 2019-03-13 RX ORDER — DEXTROSE MONOHYDRATE 25 G/50ML
25 INJECTION, SOLUTION INTRAVENOUS AS NEEDED
Status: DISCONTINUED | OUTPATIENT
Start: 2019-03-13 | End: 2019-03-20 | Stop reason: HOSPADM

## 2019-03-13 RX ORDER — PHENTOLAMINE MESYLATE 5 MG/1
5 INJECTION INTRAMUSCULAR; INTRAVENOUS ONCE
Status: DISCONTINUED | OUTPATIENT
Start: 2019-03-13 | End: 2019-03-13

## 2019-03-13 RX ORDER — PANTOPRAZOLE SODIUM 40 MG/1
40 INJECTION, POWDER, FOR SOLUTION INTRAVENOUS EVERY 12 HOURS SCHEDULED
Status: DISCONTINUED | OUTPATIENT
Start: 2019-03-13 | End: 2019-03-15

## 2019-03-13 RX ORDER — MAGNESIUM SULFATE 1 G/100ML
1 INJECTION INTRAVENOUS ONCE
Status: COMPLETED | OUTPATIENT
Start: 2019-03-13 | End: 2019-03-13

## 2019-03-13 RX ADMIN — HYDROCORTISONE SODIUM SUCCINATE 50 MG: 100 INJECTION, POWDER, FOR SOLUTION INTRAMUSCULAR; INTRAVENOUS at 17:41

## 2019-03-13 RX ADMIN — MAGNESIUM SULFATE HEPTAHYDRATE 1 G: 1 INJECTION, SOLUTION INTRAVENOUS at 02:20

## 2019-03-13 RX ADMIN — NOREPINEPHRINE BITARTRATE 30 MCG/MIN: 1 INJECTION INTRAVENOUS at 20:27

## 2019-03-13 RX ADMIN — NOREPINEPHRINE BITARTRATE 30 MCG/MIN: 1 INJECTION INTRAVENOUS at 13:07

## 2019-03-13 RX ADMIN — Medication 500 MG: at 20:03

## 2019-03-13 RX ADMIN — CEFEPIME HYDROCHLORIDE 1000 MG: 1 INJECTION, SOLUTION INTRAVENOUS at 18:13

## 2019-03-13 RX ADMIN — PANTOPRAZOLE SODIUM 40 MG: 40 INJECTION, POWDER, FOR SOLUTION INTRAVENOUS at 20:47

## 2019-03-13 RX ADMIN — AZITHROMYCIN MONOHYDRATE 500 MG: 500 INJECTION, POWDER, LYOPHILIZED, FOR SOLUTION INTRAVENOUS at 21:06

## 2019-03-13 RX ADMIN — NOREPINEPHRINE BITARTRATE 29 MCG/MIN: 1 INJECTION INTRAVENOUS at 22:59

## 2019-03-13 RX ADMIN — LIDOCAINE HYDROCHLORIDE 10 ML: 10 INJECTION, SOLUTION INFILTRATION; PERINEURAL at 09:55

## 2019-03-13 RX ADMIN — THIAMINE HYDROCHLORIDE 200 MG: 100 INJECTION, SOLUTION INTRAMUSCULAR; INTRAVENOUS at 17:42

## 2019-03-13 RX ADMIN — NOREPINEPHRINE BITARTRATE 30 MCG/MIN: 1 INJECTION INTRAVENOUS at 17:51

## 2019-03-13 RX ADMIN — NOREPINEPHRINE BITARTRATE 30 MCG/MIN: 1 INJECTION INTRAVENOUS at 13:18

## 2019-03-13 RX ADMIN — PHENYLEPHRINE HYDROCHLORIDE 25 MCG/MIN: 10 INJECTION, SOLUTION INTRAMUSCULAR; INTRAVENOUS; SUBCUTANEOUS at 23:13

## 2019-03-13 RX ADMIN — NOREPINEPHRINE BITARTRATE 30 MCG/MIN: 1 INJECTION INTRAVENOUS at 10:34

## 2019-03-13 RX ADMIN — Medication 100 MG: at 02:15

## 2019-03-13 RX ADMIN — SODIUM CHLORIDE 200 ML: 0.9 INJECTION, SOLUTION INTRAVENOUS at 04:18

## 2019-03-13 RX ADMIN — NOREPINEPHRINE BITARTRATE 30 MCG/MIN: 1 INJECTION INTRAVENOUS at 15:43

## 2019-03-13 RX ADMIN — HYDROCORTISONE SODIUM SUCCINATE 50 MG: 100 INJECTION, POWDER, FOR SOLUTION INTRAMUSCULAR; INTRAVENOUS at 23:30

## 2019-03-14 ENCOUNTER — APPOINTMENT (INPATIENT)
Dept: RADIOLOGY | Facility: HOSPITAL | Age: 84
DRG: 177 | End: 2019-03-14
Payer: COMMERCIAL

## 2019-03-14 PROBLEM — G92.8 TOXIC METABOLIC ENCEPHALOPATHY: Status: ACTIVE | Noted: 2019-03-14

## 2019-03-14 PROBLEM — R79.89 ELEVATED PLATELET COUNT: Status: ACTIVE | Noted: 2019-03-14

## 2019-03-14 LAB
ALBUMIN SERPL BCP-MCNC: 1.4 G/DL (ref 3.5–5)
ALP SERPL-CCNC: 70 U/L (ref 46–116)
ALT SERPL W P-5'-P-CCNC: 11 U/L (ref 12–78)
ANION GAP SERPL CALCULATED.3IONS-SCNC: 5 MMOL/L (ref 4–13)
AST SERPL W P-5'-P-CCNC: 13 U/L (ref 5–45)
BACTERIA SPT RESP CULT: ABNORMAL
BASOPHILS # BLD MANUAL: 0 THOUSAND/UL (ref 0–0.1)
BASOPHILS NFR MAR MANUAL: 0 % (ref 0–1)
BILIRUB SERPL-MCNC: 0.2 MG/DL (ref 0.2–1)
BUN SERPL-MCNC: 31 MG/DL (ref 5–25)
CALCIUM SERPL-MCNC: 7.4 MG/DL (ref 8.3–10.1)
CHLORIDE SERPL-SCNC: 111 MMOL/L (ref 100–108)
CO2 SERPL-SCNC: 29 MMOL/L (ref 21–32)
CREAT SERPL-MCNC: 2.88 MG/DL (ref 0.6–1.3)
EOSINOPHIL # BLD MANUAL: 0 THOUSAND/UL (ref 0–0.4)
EOSINOPHIL NFR BLD MANUAL: 0 % (ref 0–6)
ERYTHROCYTE [DISTWIDTH] IN BLOOD BY AUTOMATED COUNT: 15.5 % (ref 11.6–15.1)
GFR SERPL CREATININE-BSD FRML MDRD: 13 ML/MIN/1.73SQ M
GLUCOSE SERPL-MCNC: 100 MG/DL (ref 65–140)
GLUCOSE SERPL-MCNC: 105 MG/DL (ref 65–140)
GLUCOSE SERPL-MCNC: 94 MG/DL (ref 65–140)
GLUCOSE SERPL-MCNC: 94 MG/DL (ref 65–140)
GLUCOSE SERPL-MCNC: 98 MG/DL (ref 65–140)
GRAM STN SPEC: ABNORMAL
HCT VFR BLD AUTO: 25.8 % (ref 34.8–46.1)
HGB BLD-MCNC: 7.5 G/DL (ref 11.5–15.4)
LYMPHOCYTES # BLD AUTO: 0.77 THOUSAND/UL (ref 0.6–4.47)
LYMPHOCYTES # BLD AUTO: 6 % (ref 14–44)
MAGNESIUM SERPL-MCNC: 1.8 MG/DL (ref 1.6–2.6)
MCH RBC QN AUTO: 30.5 PG (ref 26.8–34.3)
MCHC RBC AUTO-ENTMCNC: 29.1 G/DL (ref 31.4–37.4)
MCV RBC AUTO: 105 FL (ref 82–98)
MONOCYTES # BLD AUTO: 0.13 THOUSAND/UL (ref 0–1.22)
MONOCYTES NFR BLD: 1 % (ref 4–12)
NEUTROPHILS # BLD MANUAL: 11.89 THOUSAND/UL (ref 1.85–7.62)
NEUTS BAND NFR BLD MANUAL: 5 % (ref 0–8)
NEUTS SEG NFR BLD AUTO: 88 % (ref 43–75)
NRBC BLD AUTO-RTO: 0 /100 WBCS
PHOSPHATE SERPL-MCNC: 4.3 MG/DL (ref 2.3–4.1)
PLATELET # BLD AUTO: 410 THOUSANDS/UL (ref 149–390)
PLATELET BLD QL SMEAR: ABNORMAL
PMV BLD AUTO: 8.7 FL (ref 8.9–12.7)
POTASSIUM SERPL-SCNC: 4.6 MMOL/L (ref 3.5–5.3)
PROCALCITONIN SERPL-MCNC: 1.87 NG/ML
PROT SERPL-MCNC: 4.9 G/DL (ref 6.4–8.2)
RBC # BLD AUTO: 2.46 MILLION/UL (ref 3.81–5.12)
SODIUM SERPL-SCNC: 145 MMOL/L (ref 136–145)
TOTAL CELLS COUNTED SPEC: 100
WBC # BLD AUTO: 12.78 THOUSAND/UL (ref 4.31–10.16)

## 2019-03-14 PROCEDURE — 85007 BL SMEAR W/DIFF WBC COUNT: CPT | Performed by: NURSE PRACTITIONER

## 2019-03-14 PROCEDURE — 82948 REAGENT STRIP/BLOOD GLUCOSE: CPT

## 2019-03-14 PROCEDURE — 92610 EVALUATE SWALLOWING FUNCTION: CPT

## 2019-03-14 PROCEDURE — 99291 CRITICAL CARE FIRST HOUR: CPT | Performed by: INTERNAL MEDICINE

## 2019-03-14 PROCEDURE — 84100 ASSAY OF PHOSPHORUS: CPT | Performed by: NURSE PRACTITIONER

## 2019-03-14 PROCEDURE — 82570 ASSAY OF URINE CREATININE: CPT | Performed by: INTERNAL MEDICINE

## 2019-03-14 PROCEDURE — 94760 N-INVAS EAR/PLS OXIMETRY 1: CPT

## 2019-03-14 PROCEDURE — 83735 ASSAY OF MAGNESIUM: CPT | Performed by: NURSE PRACTITIONER

## 2019-03-14 PROCEDURE — 84145 PROCALCITONIN (PCT): CPT | Performed by: NURSE PRACTITIONER

## 2019-03-14 PROCEDURE — C9113 INJ PANTOPRAZOLE SODIUM, VIA: HCPCS | Performed by: INTERNAL MEDICINE

## 2019-03-14 PROCEDURE — 84156 ASSAY OF PROTEIN URINE: CPT | Performed by: INTERNAL MEDICINE

## 2019-03-14 PROCEDURE — 84300 ASSAY OF URINE SODIUM: CPT | Performed by: INTERNAL MEDICINE

## 2019-03-14 PROCEDURE — G8996 SWALLOW CURRENT STATUS: HCPCS

## 2019-03-14 PROCEDURE — 80053 COMPREHEN METABOLIC PANEL: CPT | Performed by: NURSE PRACTITIONER

## 2019-03-14 PROCEDURE — 85027 COMPLETE CBC AUTOMATED: CPT | Performed by: NURSE PRACTITIONER

## 2019-03-14 PROCEDURE — 94660 CPAP INITIATION&MGMT: CPT

## 2019-03-14 PROCEDURE — G8997 SWALLOW GOAL STATUS: HCPCS

## 2019-03-14 PROCEDURE — 71045 X-RAY EXAM CHEST 1 VIEW: CPT

## 2019-03-14 RX ORDER — ALBUTEROL SULFATE 2.5 MG/3ML
2.5 SOLUTION RESPIRATORY (INHALATION) EVERY 6 HOURS PRN
Status: DISCONTINUED | OUTPATIENT
Start: 2019-03-14 | End: 2019-03-20 | Stop reason: HOSPADM

## 2019-03-14 RX ADMIN — AZITHROMYCIN MONOHYDRATE 500 MG: 500 INJECTION, POWDER, LYOPHILIZED, FOR SOLUTION INTRAVENOUS at 21:46

## 2019-03-14 RX ADMIN — HYDROCORTISONE SODIUM SUCCINATE 50 MG: 100 INJECTION, POWDER, FOR SOLUTION INTRAMUSCULAR; INTRAVENOUS at 12:55

## 2019-03-14 RX ADMIN — HYDROCORTISONE SODIUM SUCCINATE 50 MG: 100 INJECTION, POWDER, FOR SOLUTION INTRAMUSCULAR; INTRAVENOUS at 18:18

## 2019-03-14 RX ADMIN — THIAMINE HYDROCHLORIDE 200 MG: 100 INJECTION, SOLUTION INTRAMUSCULAR; INTRAVENOUS at 17:34

## 2019-03-14 RX ADMIN — ACETAMINOPHEN 650 MG: 650 SUPPOSITORY RECTAL at 04:15

## 2019-03-14 RX ADMIN — PANTOPRAZOLE SODIUM 40 MG: 40 INJECTION, POWDER, FOR SOLUTION INTRAVENOUS at 21:05

## 2019-03-14 RX ADMIN — THIAMINE HYDROCHLORIDE 200 MG: 100 INJECTION, SOLUTION INTRAMUSCULAR; INTRAVENOUS at 05:40

## 2019-03-14 RX ADMIN — NYSTATIN 1 APPLICATION: 100000 POWDER TOPICAL at 21:04

## 2019-03-14 RX ADMIN — PHENYLEPHRINE HYDROCHLORIDE 85 MCG/MIN: 10 INJECTION, SOLUTION INTRAMUSCULAR; INTRAVENOUS; SUBCUTANEOUS at 10:52

## 2019-03-14 RX ADMIN — PANTOPRAZOLE SODIUM 40 MG: 40 INJECTION, POWDER, FOR SOLUTION INTRAVENOUS at 10:45

## 2019-03-14 RX ADMIN — CEFEPIME HYDROCHLORIDE 500 MG: 1 INJECTION, POWDER, FOR SOLUTION INTRAMUSCULAR; INTRAVENOUS at 19:57

## 2019-03-14 RX ADMIN — HYDROCORTISONE SODIUM SUCCINATE 50 MG: 100 INJECTION, POWDER, FOR SOLUTION INTRAMUSCULAR; INTRAVENOUS at 05:39

## 2019-03-14 RX ADMIN — PHENYLEPHRINE HYDROCHLORIDE 55 MCG/MIN: 10 INJECTION, SOLUTION INTRAMUSCULAR; INTRAVENOUS; SUBCUTANEOUS at 21:46

## 2019-03-14 RX ADMIN — Medication 500 MG: at 21:04

## 2019-03-15 LAB
ALBUMIN SERPL BCP-MCNC: 1.5 G/DL (ref 3.5–5)
ALP SERPL-CCNC: 71 U/L (ref 46–116)
ALT SERPL W P-5'-P-CCNC: 12 U/L (ref 12–78)
ANION GAP SERPL CALCULATED.3IONS-SCNC: 9 MMOL/L (ref 4–13)
APTT PPP: 33 SECONDS (ref 26–38)
AST SERPL W P-5'-P-CCNC: 14 U/L (ref 5–45)
ATRIAL RATE: 64 BPM
BASOPHILS # BLD AUTO: 0.02 THOUSANDS/ΜL (ref 0–0.1)
BASOPHILS NFR BLD AUTO: 0 % (ref 0–1)
BILIRUB SERPL-MCNC: 0.2 MG/DL (ref 0.2–1)
BUN SERPL-MCNC: 42 MG/DL (ref 5–25)
CALCIUM SERPL-MCNC: 7.2 MG/DL (ref 8.3–10.1)
CHLORIDE SERPL-SCNC: 111 MMOL/L (ref 100–108)
CO2 SERPL-SCNC: 26 MMOL/L (ref 21–32)
CREAT SERPL-MCNC: 3.64 MG/DL (ref 0.6–1.3)
CREAT UR-MCNC: 71.5 MG/DL
EOSINOPHIL # BLD AUTO: 0 THOUSAND/ΜL (ref 0–0.61)
EOSINOPHIL NFR BLD AUTO: 0 % (ref 0–6)
ERYTHROCYTE [DISTWIDTH] IN BLOOD BY AUTOMATED COUNT: 15.9 % (ref 11.6–15.1)
GFR SERPL CREATININE-BSD FRML MDRD: 10 ML/MIN/1.73SQ M
GLUCOSE SERPL-MCNC: 101 MG/DL (ref 65–140)
GLUCOSE SERPL-MCNC: 102 MG/DL (ref 65–140)
GLUCOSE SERPL-MCNC: 89 MG/DL (ref 65–140)
GLUCOSE SERPL-MCNC: 91 MG/DL (ref 65–140)
GLUCOSE SERPL-MCNC: 93 MG/DL (ref 65–140)
GLUCOSE SERPL-MCNC: 98 MG/DL (ref 65–140)
HCT VFR BLD AUTO: 28.8 % (ref 34.8–46.1)
HGB BLD-MCNC: 8.2 G/DL (ref 11.5–15.4)
IMM GRANULOCYTES # BLD AUTO: 0.19 THOUSAND/UL (ref 0–0.2)
IMM GRANULOCYTES NFR BLD AUTO: 2 % (ref 0–2)
INR PPP: 1.23 (ref 0.86–1.17)
LACTATE SERPL-SCNC: 0.7 MMOL/L (ref 0.5–2)
LYMPHOCYTES # BLD AUTO: 1.11 THOUSANDS/ΜL (ref 0.6–4.47)
LYMPHOCYTES NFR BLD AUTO: 13 % (ref 14–44)
MAGNESIUM SERPL-MCNC: 2 MG/DL (ref 1.6–2.6)
MCH RBC QN AUTO: 29.9 PG (ref 26.8–34.3)
MCHC RBC AUTO-ENTMCNC: 28.5 G/DL (ref 31.4–37.4)
MCV RBC AUTO: 105 FL (ref 82–98)
MONOCYTES # BLD AUTO: 0.48 THOUSAND/ΜL (ref 0.17–1.22)
MONOCYTES NFR BLD AUTO: 5 % (ref 4–12)
NEUTROPHILS # BLD AUTO: 7.1 THOUSANDS/ΜL (ref 1.85–7.62)
NEUTS SEG NFR BLD AUTO: 80 % (ref 43–75)
NRBC BLD AUTO-RTO: 0 /100 WBCS
PHOSPHATE SERPL-MCNC: 5 MG/DL (ref 2.3–4.1)
PLATELET # BLD AUTO: 421 THOUSANDS/UL (ref 149–390)
PMV BLD AUTO: 9.1 FL (ref 8.9–12.7)
POTASSIUM SERPL-SCNC: 4.6 MMOL/L (ref 3.5–5.3)
PROCALCITONIN SERPL-MCNC: 1.28 NG/ML
PROT SERPL-MCNC: 5.2 G/DL (ref 6.4–8.2)
PROT UR-MCNC: 239 MG/DL
PROT/CREAT UR: 3.34 MG/G{CREAT} (ref 0–0.1)
PROTHROMBIN TIME: 14.9 SECONDS (ref 11.8–14.2)
QRS AXIS: 85 DEGREES
QRSD INTERVAL: 150 MS
QT INTERVAL: 380 MS
QTC INTERVAL: 544 MS
RBC # BLD AUTO: 2.74 MILLION/UL (ref 3.81–5.12)
SODIUM 24H UR-SCNC: 81 MOL/L
SODIUM SERPL-SCNC: 146 MMOL/L (ref 136–145)
T WAVE AXIS: 267 DEGREES
VANCOMYCIN TROUGH SERPL-MCNC: 14.8 UG/ML (ref 10–20)
VENTRICULAR RATE: 123 BPM
WBC # BLD AUTO: 8.9 THOUSAND/UL (ref 4.31–10.16)

## 2019-03-15 PROCEDURE — 99233 SBSQ HOSP IP/OBS HIGH 50: CPT | Performed by: INTERNAL MEDICINE

## 2019-03-15 PROCEDURE — 85610 PROTHROMBIN TIME: CPT | Performed by: INTERNAL MEDICINE

## 2019-03-15 PROCEDURE — 93010 ELECTROCARDIOGRAM REPORT: CPT | Performed by: INTERNAL MEDICINE

## 2019-03-15 PROCEDURE — 83605 ASSAY OF LACTIC ACID: CPT | Performed by: INTERNAL MEDICINE

## 2019-03-15 PROCEDURE — 82948 REAGENT STRIP/BLOOD GLUCOSE: CPT

## 2019-03-15 PROCEDURE — 80202 ASSAY OF VANCOMYCIN: CPT | Performed by: INTERNAL MEDICINE

## 2019-03-15 PROCEDURE — 85730 THROMBOPLASTIN TIME PARTIAL: CPT | Performed by: INTERNAL MEDICINE

## 2019-03-15 PROCEDURE — 92526 ORAL FUNCTION THERAPY: CPT

## 2019-03-15 PROCEDURE — 84145 PROCALCITONIN (PCT): CPT | Performed by: INTERNAL MEDICINE

## 2019-03-15 PROCEDURE — 94760 N-INVAS EAR/PLS OXIMETRY 1: CPT

## 2019-03-15 PROCEDURE — 80053 COMPREHEN METABOLIC PANEL: CPT | Performed by: INTERNAL MEDICINE

## 2019-03-15 PROCEDURE — 94660 CPAP INITIATION&MGMT: CPT

## 2019-03-15 PROCEDURE — 84100 ASSAY OF PHOSPHORUS: CPT | Performed by: INTERNAL MEDICINE

## 2019-03-15 PROCEDURE — 83735 ASSAY OF MAGNESIUM: CPT | Performed by: INTERNAL MEDICINE

## 2019-03-15 PROCEDURE — 85025 COMPLETE CBC W/AUTO DIFF WBC: CPT | Performed by: INTERNAL MEDICINE

## 2019-03-15 RX ORDER — CEFEPIME HYDROCHLORIDE 1 G/50ML
1000 INJECTION, SOLUTION INTRAVENOUS EVERY 24 HOURS
Status: DISCONTINUED | OUTPATIENT
Start: 2019-03-15 | End: 2019-03-19

## 2019-03-15 RX ORDER — PANTOPRAZOLE SODIUM 40 MG/1
40 INJECTION, POWDER, FOR SOLUTION INTRAVENOUS
Status: DISCONTINUED | OUTPATIENT
Start: 2019-03-16 | End: 2019-03-20 | Stop reason: HOSPADM

## 2019-03-15 RX ADMIN — HYDROCORTISONE SODIUM SUCCINATE 50 MG: 100 INJECTION, POWDER, FOR SOLUTION INTRAMUSCULAR; INTRAVENOUS at 00:13

## 2019-03-15 RX ADMIN — HYDROCORTISONE SODIUM SUCCINATE 50 MG: 100 INJECTION, POWDER, FOR SOLUTION INTRAMUSCULAR; INTRAVENOUS at 14:51

## 2019-03-15 RX ADMIN — HYDROCORTISONE SODIUM SUCCINATE 50 MG: 100 INJECTION, POWDER, FOR SOLUTION INTRAMUSCULAR; INTRAVENOUS at 06:03

## 2019-03-15 RX ADMIN — ALBUTEROL SULFATE 2.5 MG: 2.5 SOLUTION RESPIRATORY (INHALATION) at 19:49

## 2019-03-15 RX ADMIN — THIAMINE HYDROCHLORIDE 200 MG: 100 INJECTION, SOLUTION INTRAMUSCULAR; INTRAVENOUS at 06:03

## 2019-03-15 RX ADMIN — CALCIUM GLUCONATE: 94 INJECTION, SOLUTION INTRAVENOUS at 21:15

## 2019-03-15 RX ADMIN — Medication 500 MG: at 20:25

## 2019-03-15 RX ADMIN — PHENYLEPHRINE HYDROCHLORIDE 35 MCG/MIN: 10 INJECTION, SOLUTION INTRAMUSCULAR; INTRAVENOUS; SUBCUTANEOUS at 18:10

## 2019-03-15 RX ADMIN — NYSTATIN 1 APPLICATION: 100000 POWDER TOPICAL at 18:50

## 2019-03-15 RX ADMIN — CEFEPIME HYDROCHLORIDE 1000 MG: 1 INJECTION, SOLUTION INTRAVENOUS at 20:30

## 2019-03-15 RX ADMIN — HYDROCORTISONE SODIUM SUCCINATE 50 MG: 100 INJECTION, POWDER, FOR SOLUTION INTRAMUSCULAR; INTRAVENOUS at 18:25

## 2019-03-15 RX ADMIN — NYSTATIN 1 APPLICATION: 100000 POWDER TOPICAL at 10:00

## 2019-03-15 RX ADMIN — AZITHROMYCIN MONOHYDRATE 500 MG: 500 INJECTION, POWDER, LYOPHILIZED, FOR SOLUTION INTRAVENOUS at 21:24

## 2019-03-15 RX ADMIN — THIAMINE HYDROCHLORIDE 200 MG: 100 INJECTION, SOLUTION INTRAMUSCULAR; INTRAVENOUS at 18:45

## 2019-03-16 PROBLEM — Z22.322 MRSA (METHICILLIN RESISTANT STAPHYLOCOCCUS AUREUS) COLONIZATION: Status: ACTIVE | Noted: 2019-03-16

## 2019-03-16 LAB
ALBUMIN SERPL BCP-MCNC: 1.4 G/DL (ref 3.5–5)
ALP SERPL-CCNC: 62 U/L (ref 46–116)
ALT SERPL W P-5'-P-CCNC: 10 U/L (ref 12–78)
ANION GAP SERPL CALCULATED.3IONS-SCNC: 10 MMOL/L (ref 4–13)
AST SERPL W P-5'-P-CCNC: 10 U/L (ref 5–45)
BASOPHILS # BLD AUTO: 0.03 THOUSANDS/ΜL (ref 0–0.1)
BASOPHILS NFR BLD AUTO: 0 % (ref 0–1)
BILIRUB SERPL-MCNC: 0.1 MG/DL (ref 0.2–1)
BUN SERPL-MCNC: 53 MG/DL (ref 5–25)
CALCIUM SERPL-MCNC: 6.9 MG/DL (ref 8.3–10.1)
CHLORIDE SERPL-SCNC: 111 MMOL/L (ref 100–108)
CO2 SERPL-SCNC: 23 MMOL/L (ref 21–32)
CREAT SERPL-MCNC: 4.24 MG/DL (ref 0.6–1.3)
EOSINOPHIL # BLD AUTO: 0 THOUSAND/ΜL (ref 0–0.61)
EOSINOPHIL NFR BLD AUTO: 0 % (ref 0–6)
ERYTHROCYTE [DISTWIDTH] IN BLOOD BY AUTOMATED COUNT: 15.9 % (ref 11.6–15.1)
GFR SERPL CREATININE-BSD FRML MDRD: 8 ML/MIN/1.73SQ M
GLUCOSE SERPL-MCNC: 123 MG/DL (ref 65–140)
GLUCOSE SERPL-MCNC: 150 MG/DL (ref 65–140)
GLUCOSE SERPL-MCNC: 172 MG/DL (ref 65–140)
GLUCOSE SERPL-MCNC: 174 MG/DL (ref 65–140)
GLUCOSE SERPL-MCNC: 177 MG/DL (ref 65–140)
GLUCOSE SERPL-MCNC: 179 MG/DL (ref 65–140)
HCT VFR BLD AUTO: 29.7 % (ref 34.8–46.1)
HGB BLD-MCNC: 8.7 G/DL (ref 11.5–15.4)
IMM GRANULOCYTES # BLD AUTO: 0.28 THOUSAND/UL (ref 0–0.2)
IMM GRANULOCYTES NFR BLD AUTO: 3 % (ref 0–2)
LYMPHOCYTES # BLD AUTO: 0.95 THOUSANDS/ΜL (ref 0.6–4.47)
LYMPHOCYTES NFR BLD AUTO: 9 % (ref 14–44)
MAGNESIUM SERPL-MCNC: 2.1 MG/DL (ref 1.6–2.6)
MCH RBC QN AUTO: 30.2 PG (ref 26.8–34.3)
MCHC RBC AUTO-ENTMCNC: 29.3 G/DL (ref 31.4–37.4)
MCV RBC AUTO: 103 FL (ref 82–98)
MONOCYTES # BLD AUTO: 0.5 THOUSAND/ΜL (ref 0.17–1.22)
MONOCYTES NFR BLD AUTO: 5 % (ref 4–12)
MRSA NOSE QL CULT: ABNORMAL
MRSA NOSE QL CULT: ABNORMAL
NEUTROPHILS # BLD AUTO: 8.65 THOUSANDS/ΜL (ref 1.85–7.62)
NEUTS SEG NFR BLD AUTO: 83 % (ref 43–75)
NRBC BLD AUTO-RTO: 0 /100 WBCS
PHOSPHATE SERPL-MCNC: 5.6 MG/DL (ref 2.3–4.1)
PLATELET # BLD AUTO: 472 THOUSANDS/UL (ref 149–390)
PMV BLD AUTO: 9.2 FL (ref 8.9–12.7)
POTASSIUM SERPL-SCNC: 4.6 MMOL/L (ref 3.5–5.3)
PROT SERPL-MCNC: 4.9 G/DL (ref 6.4–8.2)
RBC # BLD AUTO: 2.88 MILLION/UL (ref 3.81–5.12)
SODIUM SERPL-SCNC: 144 MMOL/L (ref 136–145)
WBC # BLD AUTO: 10.41 THOUSAND/UL (ref 4.31–10.16)

## 2019-03-16 PROCEDURE — 85025 COMPLETE CBC W/AUTO DIFF WBC: CPT | Performed by: FAMILY MEDICINE

## 2019-03-16 PROCEDURE — 99233 SBSQ HOSP IP/OBS HIGH 50: CPT | Performed by: INTERNAL MEDICINE

## 2019-03-16 PROCEDURE — C9113 INJ PANTOPRAZOLE SODIUM, VIA: HCPCS | Performed by: INTERNAL MEDICINE

## 2019-03-16 PROCEDURE — 80053 COMPREHEN METABOLIC PANEL: CPT | Performed by: FAMILY MEDICINE

## 2019-03-16 PROCEDURE — 94760 N-INVAS EAR/PLS OXIMETRY 1: CPT

## 2019-03-16 PROCEDURE — 82270 OCCULT BLOOD FECES: CPT | Performed by: INTERNAL MEDICINE

## 2019-03-16 PROCEDURE — 94660 CPAP INITIATION&MGMT: CPT

## 2019-03-16 PROCEDURE — 84100 ASSAY OF PHOSPHORUS: CPT | Performed by: FAMILY MEDICINE

## 2019-03-16 PROCEDURE — 83735 ASSAY OF MAGNESIUM: CPT | Performed by: FAMILY MEDICINE

## 2019-03-16 PROCEDURE — 82948 REAGENT STRIP/BLOOD GLUCOSE: CPT

## 2019-03-16 RX ORDER — HEPARIN SODIUM 5000 [USP'U]/ML
5000 INJECTION, SOLUTION INTRAVENOUS; SUBCUTANEOUS EVERY 12 HOURS SCHEDULED
Status: DISCONTINUED | OUTPATIENT
Start: 2019-03-16 | End: 2019-03-17

## 2019-03-16 RX ORDER — DEXTROSE MONOHYDRATE 25 G/50ML
25 INJECTION, SOLUTION INTRAVENOUS AS NEEDED
Status: DISCONTINUED | OUTPATIENT
Start: 2019-03-16 | End: 2019-03-16 | Stop reason: SDUPTHER

## 2019-03-16 RX ADMIN — AZITHROMYCIN MONOHYDRATE 500 MG: 500 INJECTION, POWDER, LYOPHILIZED, FOR SOLUTION INTRAVENOUS at 21:09

## 2019-03-16 RX ADMIN — HYDROCORTISONE SODIUM SUCCINATE 50 MG: 100 INJECTION, POWDER, FOR SOLUTION INTRAMUSCULAR; INTRAVENOUS at 06:11

## 2019-03-16 RX ADMIN — HYDROCORTISONE SODIUM SUCCINATE 50 MG: 100 INJECTION, POWDER, FOR SOLUTION INTRAMUSCULAR; INTRAVENOUS at 00:33

## 2019-03-16 RX ADMIN — HYDROCORTISONE SODIUM SUCCINATE 50 MG: 100 INJECTION, POWDER, FOR SOLUTION INTRAMUSCULAR; INTRAVENOUS at 22:41

## 2019-03-16 RX ADMIN — MUPIROCIN 1 APPLICATION: 20 OINTMENT TOPICAL at 21:08

## 2019-03-16 RX ADMIN — PHENYLEPHRINE HYDROCHLORIDE 5 MCG/MIN: 10 INJECTION, SOLUTION INTRAMUSCULAR; INTRAVENOUS; SUBCUTANEOUS at 18:09

## 2019-03-16 RX ADMIN — NYSTATIN: 100000 POWDER TOPICAL at 08:48

## 2019-03-16 RX ADMIN — THIAMINE HYDROCHLORIDE 200 MG: 100 INJECTION, SOLUTION INTRAMUSCULAR; INTRAVENOUS at 06:12

## 2019-03-16 RX ADMIN — NYSTATIN: 100000 POWDER TOPICAL at 18:09

## 2019-03-16 RX ADMIN — THIAMINE HYDROCHLORIDE 200 MG: 100 INJECTION, SOLUTION INTRAMUSCULAR; INTRAVENOUS at 18:08

## 2019-03-16 RX ADMIN — MUPIROCIN 1 APPLICATION: 20 OINTMENT TOPICAL at 16:00

## 2019-03-16 RX ADMIN — INSULIN LISPRO 1 UNITS: 100 INJECTION, SOLUTION INTRAVENOUS; SUBCUTANEOUS at 00:33

## 2019-03-16 RX ADMIN — CALCIUM GLUCONATE: 94 INJECTION, SOLUTION INTRAVENOUS at 20:55

## 2019-03-16 RX ADMIN — CEFEPIME HYDROCHLORIDE 1000 MG: 1 INJECTION, SOLUTION INTRAVENOUS at 19:00

## 2019-03-16 RX ADMIN — INSULIN LISPRO 1 UNITS: 100 INJECTION, SOLUTION INTRAVENOUS; SUBCUTANEOUS at 06:11

## 2019-03-16 RX ADMIN — Medication 500 MG: at 19:41

## 2019-03-16 RX ADMIN — INSULIN LISPRO 1 UNITS: 100 INJECTION, SOLUTION INTRAVENOUS; SUBCUTANEOUS at 18:08

## 2019-03-16 RX ADMIN — HYDROCORTISONE SODIUM SUCCINATE 50 MG: 100 INJECTION, POWDER, FOR SOLUTION INTRAMUSCULAR; INTRAVENOUS at 11:57

## 2019-03-16 RX ADMIN — PANTOPRAZOLE SODIUM 40 MG: 40 INJECTION, POWDER, FOR SOLUTION INTRAVENOUS at 08:48

## 2019-03-16 RX ADMIN — INSULIN LISPRO 1 UNITS: 100 INJECTION, SOLUTION INTRAVENOUS; SUBCUTANEOUS at 11:57

## 2019-03-16 RX ADMIN — HEPARIN SODIUM 5000 UNITS: 5000 INJECTION, SOLUTION INTRAVENOUS; SUBCUTANEOUS at 21:08

## 2019-03-17 ENCOUNTER — APPOINTMENT (INPATIENT)
Dept: RADIOLOGY | Facility: HOSPITAL | Age: 84
DRG: 177 | End: 2019-03-17
Payer: COMMERCIAL

## 2019-03-17 LAB
ALBUMIN SERPL BCP-MCNC: 1.3 G/DL (ref 3.5–5)
ALP SERPL-CCNC: 51 U/L (ref 46–116)
ALT SERPL W P-5'-P-CCNC: 10 U/L (ref 12–78)
ANION GAP SERPL CALCULATED.3IONS-SCNC: 11 MMOL/L (ref 4–13)
AST SERPL W P-5'-P-CCNC: 9 U/L (ref 5–45)
BASOPHILS # BLD AUTO: 0.02 THOUSANDS/ΜL (ref 0–0.1)
BASOPHILS NFR BLD AUTO: 0 % (ref 0–1)
BILIRUB SERPL-MCNC: 0.2 MG/DL (ref 0.2–1)
BUN SERPL-MCNC: 63 MG/DL (ref 5–25)
CALCIUM SERPL-MCNC: 6.9 MG/DL (ref 8.3–10.1)
CHLORIDE SERPL-SCNC: 113 MMOL/L (ref 100–108)
CO2 SERPL-SCNC: 23 MMOL/L (ref 21–32)
CREAT SERPL-MCNC: 4.48 MG/DL (ref 0.6–1.3)
EOSINOPHIL # BLD AUTO: 0.01 THOUSAND/ΜL (ref 0–0.61)
EOSINOPHIL NFR BLD AUTO: 0 % (ref 0–6)
ERYTHROCYTE [DISTWIDTH] IN BLOOD BY AUTOMATED COUNT: 15.9 % (ref 11.6–15.1)
GFR SERPL CREATININE-BSD FRML MDRD: 8 ML/MIN/1.73SQ M
GLUCOSE SERPL-MCNC: 132 MG/DL (ref 65–140)
GLUCOSE SERPL-MCNC: 157 MG/DL (ref 65–140)
GLUCOSE SERPL-MCNC: 157 MG/DL (ref 65–140)
GLUCOSE SERPL-MCNC: 159 MG/DL (ref 65–140)
GLUCOSE SERPL-MCNC: 172 MG/DL (ref 65–140)
HCT VFR BLD AUTO: 25.9 % (ref 34.8–46.1)
HGB BLD-MCNC: 7.7 G/DL (ref 11.5–15.4)
IMM GRANULOCYTES # BLD AUTO: 0.35 THOUSAND/UL (ref 0–0.2)
IMM GRANULOCYTES NFR BLD AUTO: 4 % (ref 0–2)
LYMPHOCYTES # BLD AUTO: 0.78 THOUSANDS/ΜL (ref 0.6–4.47)
LYMPHOCYTES NFR BLD AUTO: 8 % (ref 14–44)
MAGNESIUM SERPL-MCNC: 2.2 MG/DL (ref 1.6–2.6)
MCH RBC QN AUTO: 30 PG (ref 26.8–34.3)
MCHC RBC AUTO-ENTMCNC: 29.7 G/DL (ref 31.4–37.4)
MCV RBC AUTO: 101 FL (ref 82–98)
MONOCYTES # BLD AUTO: 0.33 THOUSAND/ΜL (ref 0.17–1.22)
MONOCYTES NFR BLD AUTO: 4 % (ref 4–12)
NEUTROPHILS # BLD AUTO: 7.88 THOUSANDS/ΜL (ref 1.85–7.62)
NEUTS SEG NFR BLD AUTO: 84 % (ref 43–75)
NRBC BLD AUTO-RTO: 0 /100 WBCS
PHOSPHATE SERPL-MCNC: 5.6 MG/DL (ref 2.3–4.1)
PLATELET # BLD AUTO: 361 THOUSANDS/UL (ref 149–390)
PMV BLD AUTO: 9.1 FL (ref 8.9–12.7)
POTASSIUM SERPL-SCNC: 4.7 MMOL/L (ref 3.5–5.3)
PROCALCITONIN SERPL-MCNC: 0.64 NG/ML
PROT SERPL-MCNC: 4.5 G/DL (ref 6.4–8.2)
RBC # BLD AUTO: 2.57 MILLION/UL (ref 3.81–5.12)
SODIUM SERPL-SCNC: 147 MMOL/L (ref 136–145)
VANCOMYCIN TROUGH SERPL-MCNC: 18.9 UG/ML (ref 10–20)
WBC # BLD AUTO: 9.37 THOUSAND/UL (ref 4.31–10.16)

## 2019-03-17 PROCEDURE — 85025 COMPLETE CBC W/AUTO DIFF WBC: CPT | Performed by: INTERNAL MEDICINE

## 2019-03-17 PROCEDURE — 80202 ASSAY OF VANCOMYCIN: CPT | Performed by: INTERNAL MEDICINE

## 2019-03-17 PROCEDURE — 82948 REAGENT STRIP/BLOOD GLUCOSE: CPT

## 2019-03-17 PROCEDURE — 80053 COMPREHEN METABOLIC PANEL: CPT | Performed by: INTERNAL MEDICINE

## 2019-03-17 PROCEDURE — 84100 ASSAY OF PHOSPHORUS: CPT | Performed by: INTERNAL MEDICINE

## 2019-03-17 PROCEDURE — C9113 INJ PANTOPRAZOLE SODIUM, VIA: HCPCS | Performed by: INTERNAL MEDICINE

## 2019-03-17 PROCEDURE — 99232 SBSQ HOSP IP/OBS MODERATE 35: CPT | Performed by: INTERNAL MEDICINE

## 2019-03-17 PROCEDURE — 84145 PROCALCITONIN (PCT): CPT | Performed by: INTERNAL MEDICINE

## 2019-03-17 PROCEDURE — 71045 X-RAY EXAM CHEST 1 VIEW: CPT

## 2019-03-17 PROCEDURE — 83735 ASSAY OF MAGNESIUM: CPT | Performed by: INTERNAL MEDICINE

## 2019-03-17 RX ORDER — DEXTROSE MONOHYDRATE 50 MG/ML
100 INJECTION, SOLUTION INTRAVENOUS ONCE
Status: COMPLETED | OUTPATIENT
Start: 2019-03-17 | End: 2019-03-17

## 2019-03-17 RX ADMIN — INSULIN LISPRO 1 UNITS: 100 INJECTION, SOLUTION INTRAVENOUS; SUBCUTANEOUS at 13:00

## 2019-03-17 RX ADMIN — NYSTATIN: 100000 POWDER TOPICAL at 19:00

## 2019-03-17 RX ADMIN — INSULIN LISPRO 1 UNITS: 100 INJECTION, SOLUTION INTRAVENOUS; SUBCUTANEOUS at 08:40

## 2019-03-17 RX ADMIN — NYSTATIN: 100000 POWDER TOPICAL at 08:40

## 2019-03-17 RX ADMIN — HYDROCORTISONE SODIUM SUCCINATE 50 MG: 100 INJECTION, POWDER, FOR SOLUTION INTRAMUSCULAR; INTRAVENOUS at 21:03

## 2019-03-17 RX ADMIN — PANTOPRAZOLE SODIUM 40 MG: 40 INJECTION, POWDER, FOR SOLUTION INTRAVENOUS at 08:40

## 2019-03-17 RX ADMIN — CEFEPIME HYDROCHLORIDE 1000 MG: 1 INJECTION, SOLUTION INTRAVENOUS at 19:28

## 2019-03-17 RX ADMIN — HYDROCORTISONE SODIUM SUCCINATE 50 MG: 100 INJECTION, POWDER, FOR SOLUTION INTRAMUSCULAR; INTRAVENOUS at 05:14

## 2019-03-17 RX ADMIN — AZITHROMYCIN MONOHYDRATE 500 MG: 500 INJECTION, POWDER, LYOPHILIZED, FOR SOLUTION INTRAVENOUS at 21:17

## 2019-03-17 RX ADMIN — MUPIROCIN 1 APPLICATION: 20 OINTMENT TOPICAL at 21:02

## 2019-03-17 RX ADMIN — CALCIUM GLUCONATE: 94 INJECTION, SOLUTION INTRAVENOUS at 21:14

## 2019-03-17 RX ADMIN — THIAMINE HYDROCHLORIDE 200 MG: 100 INJECTION, SOLUTION INTRAMUSCULAR; INTRAVENOUS at 05:15

## 2019-03-17 RX ADMIN — MUPIROCIN 1 APPLICATION: 20 OINTMENT TOPICAL at 08:40

## 2019-03-17 RX ADMIN — LEVOTHYROXINE SODIUM 100 MCG: 100 TABLET ORAL at 05:14

## 2019-03-17 RX ADMIN — Medication 500 MG: at 20:13

## 2019-03-17 RX ADMIN — DEXTROSE 100 ML/HR: 5 SOLUTION INTRAVENOUS at 10:23

## 2019-03-17 RX ADMIN — INSULIN LISPRO 1 UNITS: 100 INJECTION, SOLUTION INTRAVENOUS; SUBCUTANEOUS at 16:32

## 2019-03-18 LAB
ALBUMIN SERPL BCP-MCNC: 1.3 G/DL (ref 3.5–5)
ALP SERPL-CCNC: 55 U/L (ref 46–116)
ALT SERPL W P-5'-P-CCNC: 12 U/L (ref 12–78)
ANION GAP SERPL CALCULATED.3IONS-SCNC: 13 MMOL/L (ref 4–13)
ANISOCYTOSIS BLD QL SMEAR: PRESENT
AST SERPL W P-5'-P-CCNC: 14 U/L (ref 5–45)
BACTERIA BLD CULT: NORMAL
BACTERIA BLD CULT: NORMAL
BASOPHILS # BLD MANUAL: 0 THOUSAND/UL (ref 0–0.1)
BASOPHILS NFR MAR MANUAL: 0 % (ref 0–1)
BILIRUB SERPL-MCNC: 0.1 MG/DL (ref 0.2–1)
BUN SERPL-MCNC: 73 MG/DL (ref 5–25)
CALCIUM SERPL-MCNC: 6.6 MG/DL (ref 8.3–10.1)
CHLORIDE SERPL-SCNC: 106 MMOL/L (ref 100–108)
CO2 SERPL-SCNC: 21 MMOL/L (ref 21–32)
CREAT SERPL-MCNC: 4.45 MG/DL (ref 0.6–1.3)
EOSINOPHIL # BLD MANUAL: 0 THOUSAND/UL (ref 0–0.4)
EOSINOPHIL NFR BLD MANUAL: 0 % (ref 0–6)
ERYTHROCYTE [DISTWIDTH] IN BLOOD BY AUTOMATED COUNT: 16 % (ref 11.6–15.1)
GFR SERPL CREATININE-BSD FRML MDRD: 8 ML/MIN/1.73SQ M
GLUCOSE SERPL-MCNC: 122 MG/DL (ref 65–140)
GLUCOSE SERPL-MCNC: 129 MG/DL (ref 65–140)
GLUCOSE SERPL-MCNC: 135 MG/DL (ref 65–140)
GLUCOSE SERPL-MCNC: 138 MG/DL (ref 65–140)
GLUCOSE SERPL-MCNC: 161 MG/DL (ref 65–140)
HCT VFR BLD AUTO: 26.5 % (ref 34.8–46.1)
HGB BLD-MCNC: 8.2 G/DL (ref 11.5–15.4)
LG PLATELETS BLD QL SMEAR: PRESENT
LYMPHOCYTES # BLD AUTO: 1.32 THOUSAND/UL (ref 0.6–4.47)
LYMPHOCYTES # BLD AUTO: 12 % (ref 14–44)
MACROCYTES BLD QL AUTO: PRESENT
MAGNESIUM SERPL-MCNC: 2.1 MG/DL (ref 1.6–2.6)
MCH RBC QN AUTO: 31.1 PG (ref 26.8–34.3)
MCHC RBC AUTO-ENTMCNC: 30.9 G/DL (ref 31.4–37.4)
MCV RBC AUTO: 100 FL (ref 82–98)
METAMYELOCYTES NFR BLD MANUAL: 1 % (ref 0–1)
MONOCYTES # BLD AUTO: 0.99 THOUSAND/UL (ref 0–1.22)
MONOCYTES NFR BLD: 9 % (ref 4–12)
NEUTROPHILS # BLD MANUAL: 8.61 THOUSAND/UL (ref 1.85–7.62)
NEUTS SEG NFR BLD AUTO: 78 % (ref 43–75)
NRBC BLD AUTO-RTO: 0 /100 WBCS
PHOSPHATE SERPL-MCNC: 6.2 MG/DL (ref 2.3–4.1)
PLATELET # BLD AUTO: 381 THOUSANDS/UL (ref 149–390)
PLATELET BLD QL SMEAR: ADEQUATE
PMV BLD AUTO: 9.2 FL (ref 8.9–12.7)
POTASSIUM SERPL-SCNC: 4.9 MMOL/L (ref 3.5–5.3)
PROCALCITONIN SERPL-MCNC: 0.47 NG/ML
PROT SERPL-MCNC: 4.7 G/DL (ref 6.4–8.2)
RBC # BLD AUTO: 2.64 MILLION/UL (ref 3.81–5.12)
SCHISTOCYTES BLD QL SMEAR: PRESENT
SODIUM SERPL-SCNC: 140 MMOL/L (ref 136–145)
STOMATOCYTES BLD QL SMEAR: PRESENT
TOTAL CELLS COUNTED SPEC: 100
TRIGL SERPL-MCNC: 218 MG/DL
WBC # BLD AUTO: 11.04 THOUSAND/UL (ref 4.31–10.16)

## 2019-03-18 PROCEDURE — 92526 ORAL FUNCTION THERAPY: CPT

## 2019-03-18 PROCEDURE — 84100 ASSAY OF PHOSPHORUS: CPT | Performed by: INTERNAL MEDICINE

## 2019-03-18 PROCEDURE — 85027 COMPLETE CBC AUTOMATED: CPT | Performed by: INTERNAL MEDICINE

## 2019-03-18 PROCEDURE — 80053 COMPREHEN METABOLIC PANEL: CPT | Performed by: INTERNAL MEDICINE

## 2019-03-18 PROCEDURE — G8978 MOBILITY CURRENT STATUS: HCPCS

## 2019-03-18 PROCEDURE — 97530 THERAPEUTIC ACTIVITIES: CPT

## 2019-03-18 PROCEDURE — 85007 BL SMEAR W/DIFF WBC COUNT: CPT | Performed by: INTERNAL MEDICINE

## 2019-03-18 PROCEDURE — 82948 REAGENT STRIP/BLOOD GLUCOSE: CPT

## 2019-03-18 PROCEDURE — G8979 MOBILITY GOAL STATUS: HCPCS

## 2019-03-18 PROCEDURE — 99232 SBSQ HOSP IP/OBS MODERATE 35: CPT | Performed by: INTERNAL MEDICINE

## 2019-03-18 PROCEDURE — 84145 PROCALCITONIN (PCT): CPT | Performed by: INTERNAL MEDICINE

## 2019-03-18 PROCEDURE — 97163 PT EVAL HIGH COMPLEX 45 MIN: CPT

## 2019-03-18 PROCEDURE — C9113 INJ PANTOPRAZOLE SODIUM, VIA: HCPCS | Performed by: INTERNAL MEDICINE

## 2019-03-18 PROCEDURE — 83735 ASSAY OF MAGNESIUM: CPT | Performed by: INTERNAL MEDICINE

## 2019-03-18 PROCEDURE — 84478 ASSAY OF TRIGLYCERIDES: CPT | Performed by: INTERNAL MEDICINE

## 2019-03-18 RX ORDER — DRONABINOL 2.5 MG/1
2.5 CAPSULE ORAL
Status: DISCONTINUED | OUTPATIENT
Start: 2019-03-18 | End: 2019-03-19

## 2019-03-18 RX ADMIN — MUPIROCIN 1 APPLICATION: 20 OINTMENT TOPICAL at 11:04

## 2019-03-18 RX ADMIN — NYSTATIN: 100000 POWDER TOPICAL at 11:04

## 2019-03-18 RX ADMIN — NYSTATIN: 100000 POWDER TOPICAL at 18:28

## 2019-03-18 RX ADMIN — Medication 500 MG: at 19:45

## 2019-03-18 RX ADMIN — CALCIUM GLUCONATE: 94 INJECTION, SOLUTION INTRAVENOUS at 20:51

## 2019-03-18 RX ADMIN — CEFEPIME HYDROCHLORIDE 1000 MG: 1 INJECTION, SOLUTION INTRAVENOUS at 18:31

## 2019-03-18 RX ADMIN — DRONABINOL 2.5 MG: 2.5 CAPSULE ORAL at 11:03

## 2019-03-18 RX ADMIN — DRONABINOL 2.5 MG: 2.5 CAPSULE ORAL at 18:28

## 2019-03-18 RX ADMIN — AZITHROMYCIN MONOHYDRATE 500 MG: 500 INJECTION, POWDER, LYOPHILIZED, FOR SOLUTION INTRAVENOUS at 21:26

## 2019-03-18 RX ADMIN — MUPIROCIN 1 APPLICATION: 20 OINTMENT TOPICAL at 20:47

## 2019-03-18 RX ADMIN — HYDROCORTISONE SODIUM SUCCINATE 50 MG: 100 INJECTION, POWDER, FOR SOLUTION INTRAMUSCULAR; INTRAVENOUS at 11:03

## 2019-03-18 RX ADMIN — PANTOPRAZOLE SODIUM 40 MG: 40 INJECTION, POWDER, FOR SOLUTION INTRAVENOUS at 11:03

## 2019-03-19 LAB
ALBUMIN SERPL BCP-MCNC: 1.3 G/DL (ref 3.5–5)
ALP SERPL-CCNC: 58 U/L (ref 46–116)
ALT SERPL W P-5'-P-CCNC: 14 U/L (ref 12–78)
ANION GAP SERPL CALCULATED.3IONS-SCNC: 13 MMOL/L (ref 4–13)
ANISOCYTOSIS BLD QL SMEAR: PRESENT
AST SERPL W P-5'-P-CCNC: 18 U/L (ref 5–45)
BASOPHILS # BLD MANUAL: 0 THOUSAND/UL (ref 0–0.1)
BASOPHILS NFR MAR MANUAL: 0 % (ref 0–1)
BILIRUB SERPL-MCNC: 0.1 MG/DL (ref 0.2–1)
BUN SERPL-MCNC: 82 MG/DL (ref 5–25)
CALCIUM SERPL-MCNC: 6.2 MG/DL (ref 8.3–10.1)
CHLORIDE SERPL-SCNC: 106 MMOL/L (ref 100–108)
CO2 SERPL-SCNC: 20 MMOL/L (ref 21–32)
CREAT SERPL-MCNC: 4.41 MG/DL (ref 0.6–1.3)
EOSINOPHIL # BLD MANUAL: 0 THOUSAND/UL (ref 0–0.4)
EOSINOPHIL NFR BLD MANUAL: 0 % (ref 0–6)
ERYTHROCYTE [DISTWIDTH] IN BLOOD BY AUTOMATED COUNT: 16.1 % (ref 11.6–15.1)
GFR SERPL CREATININE-BSD FRML MDRD: 8 ML/MIN/1.73SQ M
GLUCOSE SERPL-MCNC: 102 MG/DL (ref 65–140)
GLUCOSE SERPL-MCNC: 56 MG/DL (ref 65–140)
GLUCOSE SERPL-MCNC: 87 MG/DL (ref 65–140)
HCT VFR BLD AUTO: 27.2 % (ref 34.8–46.1)
HGB BLD-MCNC: 8.3 G/DL (ref 11.5–15.4)
LG PLATELETS BLD QL SMEAR: PRESENT
LYMPHOCYTES # BLD AUTO: 1.57 THOUSAND/UL (ref 0.6–4.47)
LYMPHOCYTES # BLD AUTO: 9 % (ref 14–44)
MACROCYTES BLD QL AUTO: PRESENT
MAGNESIUM SERPL-MCNC: 2.2 MG/DL (ref 1.6–2.6)
MCH RBC QN AUTO: 30.9 PG (ref 26.8–34.3)
MCHC RBC AUTO-ENTMCNC: 30.5 G/DL (ref 31.4–37.4)
MCV RBC AUTO: 101 FL (ref 82–98)
MONOCYTES # BLD AUTO: 0.17 THOUSAND/UL (ref 0–1.22)
MONOCYTES NFR BLD: 1 % (ref 4–12)
NEUTROPHILS # BLD MANUAL: 15.71 THOUSAND/UL (ref 1.85–7.62)
NEUTS BAND NFR BLD MANUAL: 5 % (ref 0–8)
NEUTS SEG NFR BLD AUTO: 85 % (ref 43–75)
NRBC BLD AUTO-RTO: 0 /100 WBCS
OVALOCYTES BLD QL SMEAR: PRESENT
PHOSPHATE SERPL-MCNC: 7.4 MG/DL (ref 2.3–4.1)
PLATELET # BLD AUTO: 340 THOUSANDS/UL (ref 149–390)
PLATELET BLD QL SMEAR: ADEQUATE
PMV BLD AUTO: 9.3 FL (ref 8.9–12.7)
POTASSIUM SERPL-SCNC: 4.7 MMOL/L (ref 3.5–5.3)
PROCALCITONIN SERPL-MCNC: 0.41 NG/ML
PROT SERPL-MCNC: 4.7 G/DL (ref 6.4–8.2)
RBC # BLD AUTO: 2.69 MILLION/UL (ref 3.81–5.12)
SODIUM SERPL-SCNC: 139 MMOL/L (ref 136–145)
STOMATOCYTES BLD QL SMEAR: PRESENT
TOTAL CELLS COUNTED SPEC: 100
WBC # BLD AUTO: 17.46 THOUSAND/UL (ref 4.31–10.16)

## 2019-03-19 PROCEDURE — 84145 PROCALCITONIN (PCT): CPT | Performed by: INTERNAL MEDICINE

## 2019-03-19 PROCEDURE — 82948 REAGENT STRIP/BLOOD GLUCOSE: CPT

## 2019-03-19 PROCEDURE — 85007 BL SMEAR W/DIFF WBC COUNT: CPT | Performed by: INTERNAL MEDICINE

## 2019-03-19 PROCEDURE — C9113 INJ PANTOPRAZOLE SODIUM, VIA: HCPCS | Performed by: INTERNAL MEDICINE

## 2019-03-19 PROCEDURE — 84100 ASSAY OF PHOSPHORUS: CPT | Performed by: INTERNAL MEDICINE

## 2019-03-19 PROCEDURE — 80053 COMPREHEN METABOLIC PANEL: CPT | Performed by: INTERNAL MEDICINE

## 2019-03-19 PROCEDURE — 99233 SBSQ HOSP IP/OBS HIGH 50: CPT | Performed by: INTERNAL MEDICINE

## 2019-03-19 PROCEDURE — 83735 ASSAY OF MAGNESIUM: CPT | Performed by: INTERNAL MEDICINE

## 2019-03-19 PROCEDURE — 85027 COMPLETE CBC AUTOMATED: CPT | Performed by: INTERNAL MEDICINE

## 2019-03-19 RX ADMIN — PANTOPRAZOLE SODIUM 40 MG: 40 INJECTION, POWDER, FOR SOLUTION INTRAVENOUS at 09:10

## 2019-03-19 RX ADMIN — NYSTATIN: 100000 POWDER TOPICAL at 09:10

## 2019-03-20 VITALS
DIASTOLIC BLOOD PRESSURE: 47 MMHG | HEIGHT: 62 IN | TEMPERATURE: 96.5 F | OXYGEN SATURATION: 98 % | BODY MASS INDEX: 31.85 KG/M2 | SYSTOLIC BLOOD PRESSURE: 99 MMHG | RESPIRATION RATE: 20 BRPM | WEIGHT: 173.06 LBS | HEART RATE: 69 BPM

## 2019-03-20 PROBLEM — K92.2 GASTROINTESTINAL HEMORRHAGE: Status: RESOLVED | Noted: 2019-03-13 | Resolved: 2019-03-20

## 2019-03-20 PROBLEM — J90 BILATERAL PLEURAL EFFUSION: Status: RESOLVED | Noted: 2019-03-13 | Resolved: 2019-03-20

## 2019-03-20 PROBLEM — I45.10 RIGHT BUNDLE BRANCH BLOCK: Status: RESOLVED | Noted: 2018-12-05 | Resolved: 2019-03-20

## 2019-03-20 PROBLEM — B37.2 CANDIDIASIS OF SKIN: Status: RESOLVED | Noted: 2019-03-13 | Resolved: 2019-03-20

## 2019-03-20 PROBLEM — J96.02 ACUTE RESPIRATORY FAILURE WITH HYPERCAPNIA (HCC): Status: RESOLVED | Noted: 2019-03-05 | Resolved: 2019-03-20

## 2019-03-20 PROBLEM — C50.919 BREAST CANCER (HCC): Status: RESOLVED | Noted: 2019-03-13 | Resolved: 2019-03-20

## 2019-03-20 PROBLEM — R79.89 ELEVATED PLATELET COUNT: Status: RESOLVED | Noted: 2019-03-14 | Resolved: 2019-03-20

## 2019-03-20 PROBLEM — I47.2 WIDE-COMPLEX TACHYCARDIA (HCC): Status: RESOLVED | Noted: 2019-03-13 | Resolved: 2019-03-20

## 2019-03-20 PROBLEM — Z22.322 MRSA (METHICILLIN RESISTANT STAPHYLOCOCCUS AUREUS) COLONIZATION: Status: RESOLVED | Noted: 2019-03-16 | Resolved: 2019-03-20

## 2019-03-20 LAB
DATE SPECIMEN #1: ABNORMAL
DATE SPECIMEN #2: ABNORMAL
HEMOCCULT SP1 STL QL: POSITIVE
HEMOCCULT SP2 STL QL: POSITIVE

## 2019-03-20 PROCEDURE — 99239 HOSP IP/OBS DSCHRG MGMT >30: CPT | Performed by: INTERNAL MEDICINE

## 2019-03-20 RX ORDER — HYOSCYAMINE SULFATE 0.125 MG
0.12 TABLET ORAL EVERY 4 HOURS PRN
Qty: 30 TABLET | Refills: 0 | Status: SHIPPED | OUTPATIENT
Start: 2019-03-20

## 2019-03-20 RX ORDER — MORPHINE SULFATE 100 MG/5ML
5 SOLUTION ORAL EVERY 2 HOUR PRN
Qty: 15 ML | Refills: 0 | Status: SHIPPED | OUTPATIENT
Start: 2019-03-20 | End: 2019-03-30

## 2019-03-20 RX ORDER — LORAZEPAM 2 MG/ML
0.5 CONCENTRATE ORAL EVERY 4 HOURS PRN
Qty: 30 ML | Refills: 0 | Status: SHIPPED | OUTPATIENT
Start: 2019-03-20 | End: 2019-03-30

## 2019-03-20 RX ORDER — ACETAMINOPHEN 650 MG/1
650 SUPPOSITORY RECTAL EVERY 6 HOURS PRN
Refills: 0
Start: 2019-03-20

## 2019-03-20 RX ADMIN — NYSTATIN: 100000 POWDER TOPICAL at 17:41

## 2019-03-20 RX ADMIN — NYSTATIN: 100000 POWDER TOPICAL at 09:09

## 2020-05-29 NOTE — ASSESSMENT & PLAN NOTE
Results for Kristina Hdz (MRN [id number]) as of 12/6/2018 10:23   Ref   Range 12/6/2018 05:02   TSH 3RD GENERATON Latest Ref Range: 0 358 - 3 740 uIU/mL 5 787 (H)     · Increase levothyroxine to 100 micrograms PO Qdaily in the early morning  · Recheck a TSH level in 4-6 weeks with her PCP no back pain, no gout, no musculoskeletal pain, no neck pain, and no weakness.